# Patient Record
Sex: FEMALE | Race: BLACK OR AFRICAN AMERICAN | NOT HISPANIC OR LATINO | Employment: UNEMPLOYED | URBAN - METROPOLITAN AREA
[De-identification: names, ages, dates, MRNs, and addresses within clinical notes are randomized per-mention and may not be internally consistent; named-entity substitution may affect disease eponyms.]

---

## 2024-07-19 ENCOUNTER — HOSPITAL ENCOUNTER (INPATIENT)
Facility: HOSPITAL | Age: 74
End: 2024-07-19
Attending: STUDENT IN AN ORGANIZED HEALTH CARE EDUCATION/TRAINING PROGRAM | Admitting: INTERNAL MEDICINE
Payer: COMMERCIAL

## 2024-07-19 DIAGNOSIS — I50.9 ACUTE HEART FAILURE, UNSPECIFIED HEART FAILURE TYPE (MULTI): ICD-10-CM

## 2024-07-19 DIAGNOSIS — I49.02 VENTRICULAR FIBRILLATION AND FLUTTER (MULTI): ICD-10-CM

## 2024-07-19 DIAGNOSIS — I95.9 HYPOTENSION, UNSPECIFIED HYPOTENSION TYPE: ICD-10-CM

## 2024-07-19 DIAGNOSIS — I49.01 VENTRICULAR FIBRILLATION AND FLUTTER (MULTI): ICD-10-CM

## 2024-07-19 DIAGNOSIS — I49.01 CARDIAC ARREST WITH VENTRICULAR FIBRILLATION (MULTI): ICD-10-CM

## 2024-07-19 DIAGNOSIS — R41.82 ALTERED MENTAL STATUS, UNSPECIFIED ALTERED MENTAL STATUS TYPE: ICD-10-CM

## 2024-07-19 DIAGNOSIS — Z95.810 PRESENCE OF AUTOMATIC (IMPLANTABLE) CARDIAC DEFIBRILLATOR: ICD-10-CM

## 2024-07-19 DIAGNOSIS — I46.9 CARDIAC ARREST (MULTI): Primary | ICD-10-CM

## 2024-07-19 DIAGNOSIS — I46.9 CARDIAC ARREST WITH VENTRICULAR FIBRILLATION (MULTI): ICD-10-CM

## 2024-07-19 LAB
ABO GROUP (TYPE) IN BLOOD: NORMAL
ALBUMIN SERPL-MCNC: 3.2 G/DL (ref 3.5–5)
ALBUMIN SERPL-MCNC: 3.5 G/DL (ref 3.5–5)
ALP BLD-CCNC: 122 U/L (ref 35–125)
ALT SERPL-CCNC: 91 U/L (ref 5–40)
AMPHETAMINES UR QL SCN>1000 NG/ML: NEGATIVE
ANION GAP BLDA CALCULATED.4IONS-SCNC: 12 MMO/L (ref 10–25)
ANION GAP BLDA CALCULATED.4IONS-SCNC: 15 MMO/L (ref 10–25)
ANION GAP BLDA CALCULATED.4IONS-SCNC: 17 MMO/L (ref 10–25)
ANION GAP SERPL CALC-SCNC: 17 MMOL/L
ANION GAP SERPL CALC-SCNC: 9 MMOL/L
ANTIBODY SCREEN: NORMAL
APTT PPP: 25.5 SECONDS (ref 22–32.5)
ARTERIAL PATENCY WRIST A: NEGATIVE
ARTERIAL PATENCY WRIST A: POSITIVE
ARTERIAL PATENCY WRIST A: POSITIVE
AST SERPL-CCNC: 101 U/L (ref 5–40)
ATRIAL RATE: 141 BPM
BARBITURATES UR QL SCN>300 NG/ML: NEGATIVE
BASE EXCESS BLDA CALC-SCNC: -3.2 MMOL/L (ref -2–3)
BASE EXCESS BLDA CALC-SCNC: -7.2 MMOL/L (ref -2–3)
BASE EXCESS BLDA CALC-SCNC: -8.7 MMOL/L (ref -2–3)
BASOPHILS # BLD AUTO: 0.04 X10*3/UL (ref 0–0.1)
BASOPHILS # BLD AUTO: 0.06 X10*3/UL (ref 0–0.1)
BASOPHILS NFR BLD AUTO: 0.2 %
BASOPHILS NFR BLD AUTO: 0.4 %
BENZODIAZ UR QL SCN>300 NG/ML: NEGATIVE
BILIRUB DIRECT SERPL-MCNC: <0.2 MG/DL (ref 0–0.2)
BILIRUB SERPL-MCNC: 0.5 MG/DL (ref 0.1–1.2)
BODY SURFACE AREA: 2.18 M2
BODY TEMPERATURE: 37 DEGREES CELSIUS
BUN SERPL-MCNC: 12 MG/DL (ref 8–25)
BUN SERPL-MCNC: 14 MG/DL (ref 8–25)
BZE UR QL SCN>300 NG/ML: NEGATIVE
CA-I BLDA-SCNC: 1.1 MMOL/L (ref 1.1–1.33)
CA-I BLDA-SCNC: 1.15 MMOL/L (ref 1.1–1.33)
CA-I BLDA-SCNC: 1.15 MMOL/L (ref 1.1–1.33)
CALCIUM SERPL-MCNC: 8.3 MG/DL (ref 8.5–10.4)
CALCIUM SERPL-MCNC: 8.5 MG/DL (ref 8.5–10.4)
CANNABINOIDS UR QL SCN>50 NG/ML: NEGATIVE
CHLORIDE BLDA-SCNC: 106 MMOL/L (ref 98–107)
CHLORIDE SERPL-SCNC: 104 MMOL/L (ref 97–107)
CHLORIDE SERPL-SCNC: 105 MMOL/L (ref 97–107)
CO2 SERPL-SCNC: 19 MMOL/L (ref 24–31)
CO2 SERPL-SCNC: 23 MMOL/L (ref 24–31)
CREAT SERPL-MCNC: 1.1 MG/DL (ref 0.4–1.6)
CREAT SERPL-MCNC: 1.3 MG/DL (ref 0.4–1.6)
EGFRCR SERPLBLD CKD-EPI 2021: 44 ML/MIN/1.73M*2
EGFRCR SERPLBLD CKD-EPI 2021: 53 ML/MIN/1.73M*2
EOSINOPHIL # BLD AUTO: 0.01 X10*3/UL (ref 0–0.4)
EOSINOPHIL # BLD AUTO: 0.13 X10*3/UL (ref 0–0.4)
EOSINOPHIL NFR BLD AUTO: 0 %
EOSINOPHIL NFR BLD AUTO: 0.9 %
ERYTHROCYTE [DISTWIDTH] IN BLOOD BY AUTOMATED COUNT: 15.9 % (ref 11.5–14.5)
ERYTHROCYTE [DISTWIDTH] IN BLOOD BY AUTOMATED COUNT: 15.9 % (ref 11.5–14.5)
ETHANOL SERPL-MCNC: <0.01 G/DL
FENTANYL+NORFENTANYL UR QL SCN: NEGATIVE
GLUCOSE BLD MANUAL STRIP-MCNC: 124 MG/DL (ref 74–99)
GLUCOSE BLD MANUAL STRIP-MCNC: 128 MG/DL (ref 74–99)
GLUCOSE BLD MANUAL STRIP-MCNC: 188 MG/DL (ref 74–99)
GLUCOSE BLDA-MCNC: 222 MG/DL (ref 74–99)
GLUCOSE BLDA-MCNC: 231 MG/DL (ref 74–99)
GLUCOSE BLDA-MCNC: 263 MG/DL (ref 74–99)
GLUCOSE SERPL-MCNC: 157 MG/DL (ref 65–99)
GLUCOSE SERPL-MCNC: 233 MG/DL (ref 65–99)
HCO3 BLDA-SCNC: 18.8 MMOL/L (ref 22–26)
HCO3 BLDA-SCNC: 20.1 MMOL/L (ref 22–26)
HCO3 BLDA-SCNC: 22.7 MMOL/L (ref 22–26)
HCT VFR BLD AUTO: 38.5 % (ref 36–46)
HCT VFR BLD AUTO: 39.6 % (ref 36–46)
HCT VFR BLD EST: 35 % (ref 36–46)
HCT VFR BLD EST: 37 % (ref 36–46)
HCT VFR BLD EST: 38 % (ref 36–46)
HGB BLD-MCNC: 11.7 G/DL (ref 12–16)
HGB BLD-MCNC: 12 G/DL (ref 12–16)
HGB BLDA-MCNC: 11.6 G/DL (ref 12–16)
HGB BLDA-MCNC: 12.3 G/DL (ref 12–16)
HGB BLDA-MCNC: 12.6 G/DL (ref 12–16)
IMM GRANULOCYTES # BLD AUTO: 0.13 X10*3/UL (ref 0–0.5)
IMM GRANULOCYTES # BLD AUTO: 0.66 X10*3/UL (ref 0–0.5)
IMM GRANULOCYTES NFR BLD AUTO: 0.6 % (ref 0–0.9)
IMM GRANULOCYTES NFR BLD AUTO: 4.5 % (ref 0–0.9)
INHALED O2 CONCENTRATION: 100 %
INR PPP: 1 (ref 0.9–1.2)
LACTATE BLDA-SCNC: 2.1 MMOL/L (ref 0.4–2)
LACTATE BLDA-SCNC: 2.9 MMOL/L (ref 0.4–2)
LACTATE BLDA-SCNC: 4.8 MMOL/L (ref 0.4–2)
LYMPHOCYTES # BLD AUTO: 1.18 X10*3/UL (ref 0.8–3)
LYMPHOCYTES # BLD AUTO: 3.96 X10*3/UL (ref 0.8–3)
LYMPHOCYTES NFR BLD AUTO: 26.9 %
LYMPHOCYTES NFR BLD AUTO: 5.4 %
MAGNESIUM SERPL-MCNC: 2.4 MG/DL (ref 1.6–3.1)
MAGNESIUM SERPL-MCNC: 2.5 MG/DL (ref 1.6–3.1)
MCH RBC QN AUTO: 27.2 PG (ref 26–34)
MCH RBC QN AUTO: 27.6 PG (ref 26–34)
MCHC RBC AUTO-ENTMCNC: 30.3 G/DL (ref 32–36)
MCHC RBC AUTO-ENTMCNC: 30.4 G/DL (ref 32–36)
MCV RBC AUTO: 90 FL (ref 80–100)
MCV RBC AUTO: 91 FL (ref 80–100)
METHADONE UR QL SCN>300 NG/ML: NEGATIVE
MONOCYTES # BLD AUTO: 0.52 X10*3/UL (ref 0.05–0.8)
MONOCYTES # BLD AUTO: 0.92 X10*3/UL (ref 0.05–0.8)
MONOCYTES NFR BLD AUTO: 3.5 %
MONOCYTES NFR BLD AUTO: 4.2 %
MRSA DNA SPEC QL NAA+PROBE: NOT DETECTED
NEUTROPHILS # BLD AUTO: 19.45 X10*3/UL (ref 1.6–5.5)
NEUTROPHILS # BLD AUTO: 9.4 X10*3/UL (ref 1.6–5.5)
NEUTROPHILS NFR BLD AUTO: 63.8 %
NEUTROPHILS NFR BLD AUTO: 89.6 %
NRBC BLD-RTO: 0 /100 WBCS (ref 0–0)
NRBC BLD-RTO: 0 /100 WBCS (ref 0–0)
OPIATES UR QL SCN>300 NG/ML: NEGATIVE
OXYCODONE UR QL: NEGATIVE
OXYHGB MFR BLDA: 90.9 % (ref 94–98)
OXYHGB MFR BLDA: 95.2 % (ref 94–98)
OXYHGB MFR BLDA: 96.9 % (ref 94–98)
P AXIS: 63 DEGREES
PCO2 BLDA: 43 MM HG (ref 38–42)
PCO2 BLDA: 46 MM HG (ref 38–42)
PCO2 BLDA: 47 MM HG (ref 38–42)
PCP UR QL SCN>25 NG/ML: NEGATIVE
PEEP CMH2O: 8 CM H2O
PH BLDA: 7.22 PH (ref 7.38–7.42)
PH BLDA: 7.24 PH (ref 7.38–7.42)
PH BLDA: 7.33 PH (ref 7.38–7.42)
PHOSPHATE SERPL-MCNC: 2.2 MG/DL (ref 2.5–4.5)
PLATELET # BLD AUTO: 229 X10*3/UL (ref 150–450)
PLATELET # BLD AUTO: 246 X10*3/UL (ref 150–450)
PO2 BLDA: 179 MM HG (ref 85–95)
PO2 BLDA: 68 MM HG (ref 85–95)
PO2 BLDA: 89 MM HG (ref 85–95)
POTASSIUM BLDA-SCNC: 3 MMOL/L (ref 3.5–5.3)
POTASSIUM BLDA-SCNC: 3.3 MMOL/L (ref 3.5–5.3)
POTASSIUM BLDA-SCNC: 3.4 MMOL/L (ref 3.5–5.3)
POTASSIUM SERPL-SCNC: 3 MMOL/L (ref 3.4–5.1)
POTASSIUM SERPL-SCNC: 3.9 MMOL/L (ref 3.4–5.1)
PROT SERPL-MCNC: 6.8 G/DL (ref 5.9–7.9)
PROTHROMBIN TIME: 11 SECONDS (ref 9.3–12.7)
Q ONSET: 209 MS
QRS COUNT: 15 BEATS
QRS DURATION: 122 MS
QT INTERVAL: 396 MS
QTC CALCULATION(BAZETT): 492 MS
QTC FREDERICIA: 458 MS
R AXIS: -45 DEGREES
RBC # BLD AUTO: 4.3 X10*6/UL (ref 4–5.2)
RBC # BLD AUTO: 4.34 X10*6/UL (ref 4–5.2)
RH FACTOR (ANTIGEN D): NORMAL
SAO2 % BLDA: 92 % (ref 94–100)
SAO2 % BLDA: 97 % (ref 94–100)
SAO2 % BLDA: 99 % (ref 94–100)
SODIUM BLDA-SCNC: 137 MMOL/L (ref 136–145)
SODIUM BLDA-SCNC: 138 MMOL/L (ref 136–145)
SODIUM BLDA-SCNC: 139 MMOL/L (ref 136–145)
SODIUM SERPL-SCNC: 136 MMOL/L (ref 133–145)
SODIUM SERPL-SCNC: 141 MMOL/L (ref 133–145)
SPECIMEN DRAWN FROM PATIENT: ABNORMAL
T AXIS: 124 DEGREES
T OFFSET: 407 MS
TIDAL VOLUME: 450 ML
TROPONIN T SERPL-MCNC: 18 NG/L
TSH SERPL DL<=0.05 MIU/L-ACNC: 2.98 MIU/L (ref 0.27–4.2)
VENTILATOR MODE: ABNORMAL
VENTILATOR RATE: 14 BPM
VENTILATOR RATE: 16 BPM
VENTILATOR RATE: 20 BPM
VENTRICULAR RATE: 93 BPM
WBC # BLD AUTO: 14.7 X10*3/UL (ref 4.4–11.3)
WBC # BLD AUTO: 21.7 X10*3/UL (ref 4.4–11.3)

## 2024-07-19 PROCEDURE — 82947 ASSAY GLUCOSE BLOOD QUANT: CPT

## 2024-07-19 PROCEDURE — 2500000004 HC RX 250 GENERAL PHARMACY W/ HCPCS (ALT 636 FOR OP/ED)

## 2024-07-19 PROCEDURE — 2020000001 HC ICU ROOM DAILY

## 2024-07-19 PROCEDURE — 70450 CT HEAD/BRAIN W/O DYE: CPT | Performed by: STUDENT IN AN ORGANIZED HEALTH CARE EDUCATION/TRAINING PROGRAM

## 2024-07-19 PROCEDURE — 2500000004 HC RX 250 GENERAL PHARMACY W/ HCPCS (ALT 636 FOR OP/ED): Mod: JZ | Performed by: INTERNAL MEDICINE

## 2024-07-19 PROCEDURE — 96375 TX/PRO/DX INJ NEW DRUG ADDON: CPT | Mod: 59

## 2024-07-19 PROCEDURE — 99291 CRITICAL CARE FIRST HOUR: CPT | Mod: 25 | Performed by: STUDENT IN AN ORGANIZED HEALTH CARE EDUCATION/TRAINING PROGRAM

## 2024-07-19 PROCEDURE — 82248 BILIRUBIN DIRECT: CPT | Performed by: STUDENT IN AN ORGANIZED HEALTH CARE EDUCATION/TRAINING PROGRAM

## 2024-07-19 PROCEDURE — 37799 UNLISTED PX VASCULAR SURGERY: CPT | Performed by: STUDENT IN AN ORGANIZED HEALTH CARE EDUCATION/TRAINING PROGRAM

## 2024-07-19 PROCEDURE — 82435 ASSAY OF BLOOD CHLORIDE: CPT | Performed by: STUDENT IN AN ORGANIZED HEALTH CARE EDUCATION/TRAINING PROGRAM

## 2024-07-19 PROCEDURE — 2500000005 HC RX 250 GENERAL PHARMACY W/O HCPCS: Performed by: INTERNAL MEDICINE

## 2024-07-19 PROCEDURE — 84443 ASSAY THYROID STIM HORMONE: CPT | Performed by: STUDENT IN AN ORGANIZED HEALTH CARE EDUCATION/TRAINING PROGRAM

## 2024-07-19 PROCEDURE — 99292 CRITICAL CARE ADDL 30 MIN: CPT | Performed by: STUDENT IN AN ORGANIZED HEALTH CARE EDUCATION/TRAINING PROGRAM

## 2024-07-19 PROCEDURE — 94799 UNLISTED PULMONARY SVC/PX: CPT

## 2024-07-19 PROCEDURE — 99291 CRITICAL CARE FIRST HOUR: CPT | Performed by: INTERNAL MEDICINE

## 2024-07-19 PROCEDURE — 82330 ASSAY OF CALCIUM: CPT | Performed by: STUDENT IN AN ORGANIZED HEALTH CARE EDUCATION/TRAINING PROGRAM

## 2024-07-19 PROCEDURE — 87040 BLOOD CULTURE FOR BACTERIA: CPT | Mod: WESLAB | Performed by: STUDENT IN AN ORGANIZED HEALTH CARE EDUCATION/TRAINING PROGRAM

## 2024-07-19 PROCEDURE — 85025 COMPLETE CBC W/AUTO DIFF WBC: CPT

## 2024-07-19 PROCEDURE — 84132 ASSAY OF SERUM POTASSIUM: CPT | Performed by: STUDENT IN AN ORGANIZED HEALTH CARE EDUCATION/TRAINING PROGRAM

## 2024-07-19 PROCEDURE — 83735 ASSAY OF MAGNESIUM: CPT | Performed by: STUDENT IN AN ORGANIZED HEALTH CARE EDUCATION/TRAINING PROGRAM

## 2024-07-19 PROCEDURE — 31500 INSERT EMERGENCY AIRWAY: CPT | Performed by: STUDENT IN AN ORGANIZED HEALTH CARE EDUCATION/TRAINING PROGRAM

## 2024-07-19 PROCEDURE — 36556 INSERT NON-TUNNEL CV CATH: CPT | Performed by: STUDENT IN AN ORGANIZED HEALTH CARE EDUCATION/TRAINING PROGRAM

## 2024-07-19 PROCEDURE — 02HV33Z INSERTION OF INFUSION DEVICE INTO SUPERIOR VENA CAVA, PERCUTANEOUS APPROACH: ICD-10-PCS | Performed by: INTERNAL MEDICINE

## 2024-07-19 PROCEDURE — 87640 STAPH A DNA AMP PROBE: CPT | Performed by: INTERNAL MEDICINE

## 2024-07-19 PROCEDURE — 80307 DRUG TEST PRSMV CHEM ANLYZR: CPT | Performed by: STUDENT IN AN ORGANIZED HEALTH CARE EDUCATION/TRAINING PROGRAM

## 2024-07-19 PROCEDURE — 70450 CT HEAD/BRAIN W/O DYE: CPT | Performed by: RADIOLOGY

## 2024-07-19 PROCEDURE — 87641 MR-STAPH DNA AMP PROBE: CPT | Performed by: INTERNAL MEDICINE

## 2024-07-19 PROCEDURE — 94681 O2 UPTK CO2 OUTP % O2 XTRC: CPT

## 2024-07-19 PROCEDURE — 71045 X-RAY EXAM CHEST 1 VIEW: CPT | Performed by: RADIOLOGY

## 2024-07-19 PROCEDURE — 85730 THROMBOPLASTIN TIME PARTIAL: CPT | Performed by: STUDENT IN AN ORGANIZED HEALTH CARE EDUCATION/TRAINING PROGRAM

## 2024-07-19 PROCEDURE — 36620 INSERTION CATHETER ARTERY: CPT | Performed by: STUDENT IN AN ORGANIZED HEALTH CARE EDUCATION/TRAINING PROGRAM

## 2024-07-19 PROCEDURE — 2500000004 HC RX 250 GENERAL PHARMACY W/ HCPCS (ALT 636 FOR OP/ED): Performed by: STUDENT IN AN ORGANIZED HEALTH CARE EDUCATION/TRAINING PROGRAM

## 2024-07-19 PROCEDURE — 94002 VENT MGMT INPAT INIT DAY: CPT

## 2024-07-19 PROCEDURE — 83735 ASSAY OF MAGNESIUM: CPT

## 2024-07-19 PROCEDURE — 5A1955Z RESPIRATORY VENTILATION, GREATER THAN 96 CONSECUTIVE HOURS: ICD-10-PCS | Performed by: INTERNAL MEDICINE

## 2024-07-19 PROCEDURE — 84132 ASSAY OF SERUM POTASSIUM: CPT

## 2024-07-19 PROCEDURE — 36600 WITHDRAWAL OF ARTERIAL BLOOD: CPT

## 2024-07-19 PROCEDURE — 93010 ELECTROCARDIOGRAM REPORT: CPT | Performed by: INTERNAL MEDICINE

## 2024-07-19 PROCEDURE — 86901 BLOOD TYPING SEROLOGIC RH(D): CPT | Performed by: STUDENT IN AN ORGANIZED HEALTH CARE EDUCATION/TRAINING PROGRAM

## 2024-07-19 PROCEDURE — 2500000001 HC RX 250 WO HCPCS SELF ADMINISTERED DRUGS (ALT 637 FOR MEDICARE OP)

## 2024-07-19 PROCEDURE — 96374 THER/PROPH/DIAG INJ IV PUSH: CPT | Mod: 59

## 2024-07-19 PROCEDURE — 85025 COMPLETE CBC W/AUTO DIFF WBC: CPT | Performed by: STUDENT IN AN ORGANIZED HEALTH CARE EDUCATION/TRAINING PROGRAM

## 2024-07-19 PROCEDURE — 84295 ASSAY OF SERUM SODIUM: CPT

## 2024-07-19 PROCEDURE — 86900 BLOOD TYPING SEROLOGIC ABO: CPT | Performed by: STUDENT IN AN ORGANIZED HEALTH CARE EDUCATION/TRAINING PROGRAM

## 2024-07-19 PROCEDURE — 85610 PROTHROMBIN TIME: CPT | Performed by: STUDENT IN AN ORGANIZED HEALTH CARE EDUCATION/TRAINING PROGRAM

## 2024-07-19 PROCEDURE — 84132 ASSAY OF SERUM POTASSIUM: CPT | Performed by: INTERNAL MEDICINE

## 2024-07-19 PROCEDURE — 82077 ASSAY SPEC XCP UR&BREATH IA: CPT | Performed by: STUDENT IN AN ORGANIZED HEALTH CARE EDUCATION/TRAINING PROGRAM

## 2024-07-19 PROCEDURE — 2500000005 HC RX 250 GENERAL PHARMACY W/O HCPCS: Performed by: STUDENT IN AN ORGANIZED HEALTH CARE EDUCATION/TRAINING PROGRAM

## 2024-07-19 PROCEDURE — 0BH17EZ INSERTION OF ENDOTRACHEAL AIRWAY INTO TRACHEA, VIA NATURAL OR ARTIFICIAL OPENING: ICD-10-PCS | Performed by: INTERNAL MEDICINE

## 2024-07-19 PROCEDURE — 84484 ASSAY OF TROPONIN QUANT: CPT | Performed by: STUDENT IN AN ORGANIZED HEALTH CARE EDUCATION/TRAINING PROGRAM

## 2024-07-19 PROCEDURE — 37799 UNLISTED PX VASCULAR SURGERY: CPT

## 2024-07-19 PROCEDURE — 36415 COLL VENOUS BLD VENIPUNCTURE: CPT | Performed by: STUDENT IN AN ORGANIZED HEALTH CARE EDUCATION/TRAINING PROGRAM

## 2024-07-19 PROCEDURE — 85018 HEMOGLOBIN: CPT | Performed by: INTERNAL MEDICINE

## 2024-07-19 PROCEDURE — B2111ZZ FLUOROSCOPY OF MULTIPLE CORONARY ARTERIES USING LOW OSMOLAR CONTRAST: ICD-10-PCS | Performed by: INTERNAL MEDICINE

## 2024-07-19 RX ORDER — NOREPINEPHRINE BITARTRATE/D5W 8 MG/250ML
0-.2 PLASTIC BAG, INJECTION (ML) INTRAVENOUS CONTINUOUS
Status: DISCONTINUED | OUTPATIENT
Start: 2024-07-19 | End: 2024-07-22

## 2024-07-19 RX ORDER — PROPOFOL 10 MG/ML
5-50 INJECTION, EMULSION INTRAVENOUS CONTINUOUS
Status: DISCONTINUED | OUTPATIENT
Start: 2024-07-19 | End: 2024-07-21

## 2024-07-19 RX ORDER — POTASSIUM CHLORIDE 29.8 MG/ML
40 INJECTION INTRAVENOUS ONCE
Status: DISCONTINUED | OUTPATIENT
Start: 2024-07-19 | End: 2024-07-19 | Stop reason: CLARIF

## 2024-07-19 RX ORDER — VANCOMYCIN HYDROCHLORIDE 1 G/20ML
INJECTION, POWDER, LYOPHILIZED, FOR SOLUTION INTRAVENOUS DAILY PRN
Status: DISCONTINUED | OUTPATIENT
Start: 2024-07-19 | End: 2024-07-21

## 2024-07-19 RX ORDER — POTASSIUM CHLORIDE 1.5 G/1.58G
40 POWDER, FOR SOLUTION ORAL ONCE
Status: COMPLETED | OUTPATIENT
Start: 2024-07-19 | End: 2024-07-19

## 2024-07-19 RX ORDER — POTASSIUM CHLORIDE 29.8 MG/ML
40 INJECTION INTRAVENOUS ONCE
Status: COMPLETED | OUTPATIENT
Start: 2024-07-19 | End: 2024-07-19

## 2024-07-19 RX ORDER — FENTANYL CITRATE 50 UG/ML
150 INJECTION, SOLUTION INTRAMUSCULAR; INTRAVENOUS ONCE
Status: COMPLETED | OUTPATIENT
Start: 2024-07-19 | End: 2024-07-19

## 2024-07-19 RX ORDER — VANCOMYCIN 1.75 G/350ML
1250 INJECTION, SOLUTION INTRAVENOUS EVERY 24 HOURS
Status: DISCONTINUED | OUTPATIENT
Start: 2024-07-19 | End: 2024-07-21

## 2024-07-19 RX ORDER — MAGNESIUM SULFATE HEPTAHYDRATE 40 MG/ML
2 INJECTION, SOLUTION INTRAVENOUS ONCE
Status: COMPLETED | OUTPATIENT
Start: 2024-07-19 | End: 2024-07-19

## 2024-07-19 RX ORDER — AMLODIPINE BESYLATE 10 MG/1
10 TABLET ORAL
COMMUNITY
Start: 2024-07-01 | End: 2024-08-02 | Stop reason: HOSPADM

## 2024-07-19 RX ORDER — FENTANYL CITRATE 50 UG/ML
25 INJECTION, SOLUTION INTRAMUSCULAR; INTRAVENOUS EVERY 10 MIN PRN
Status: DISCONTINUED | OUTPATIENT
Start: 2024-07-19 | End: 2024-07-21

## 2024-07-19 RX ORDER — ALLOPURINOL 300 MG/1
300 TABLET ORAL
COMMUNITY
Start: 2024-07-01 | End: 2024-12-28

## 2024-07-19 RX ORDER — FENTANYL CITRATE-0.9 % NACL/PF 10 MCG/ML
0-300 PLASTIC BAG, INJECTION (ML) INTRAVENOUS CONTINUOUS
Status: DISCONTINUED | OUTPATIENT
Start: 2024-07-19 | End: 2024-07-21

## 2024-07-19 RX ORDER — HEPARIN SODIUM 5000 [USP'U]/ML
5000 INJECTION, SOLUTION INTRAVENOUS; SUBCUTANEOUS EVERY 8 HOURS
Status: DISCONTINUED | OUTPATIENT
Start: 2024-07-19 | End: 2024-07-21

## 2024-07-19 RX ORDER — FENTANYL CITRATE 50 UG/ML
25 INJECTION, SOLUTION INTRAMUSCULAR; INTRAVENOUS ONCE
Status: DISCONTINUED | OUTPATIENT
Start: 2024-07-19 | End: 2024-07-21

## 2024-07-19 RX ORDER — NOREPINEPHRINE BITARTRATE/D5W 8 MG/250ML
PLASTIC BAG, INJECTION (ML) INTRAVENOUS
Status: COMPLETED
Start: 2024-07-19 | End: 2024-07-19

## 2024-07-19 RX ORDER — ATORVASTATIN CALCIUM 10 MG/1
10 TABLET, FILM COATED ORAL
COMMUNITY
Start: 2024-07-01 | End: 2024-12-28

## 2024-07-19 RX ADMIN — NOREPINEPHRINE BITARTRATE 0.1 MCG/KG/MIN: 8 INJECTION, SOLUTION INTRAVENOUS at 13:37

## 2024-07-19 RX ADMIN — PERFLUTREN 10 ML OF DILUTION: 6.52 INJECTION, SUSPENSION INTRAVENOUS at 15:56

## 2024-07-19 RX ADMIN — FENTANYL CITRATE 25 MCG: 0.05 INJECTION, SOLUTION INTRAMUSCULAR; INTRAVENOUS at 13:35

## 2024-07-19 RX ADMIN — MAGNESIUM SULFATE HEPTAHYDRATE 2 G: 40 INJECTION, SOLUTION INTRAVENOUS at 16:25

## 2024-07-19 RX ADMIN — FENTANYL CITRATE 150 MCG: 50 INJECTION INTRAMUSCULAR; INTRAVENOUS at 13:22

## 2024-07-19 RX ADMIN — POTASSIUM CHLORIDE 40 MEQ: 1.5 FOR SOLUTION ORAL at 16:24

## 2024-07-19 RX ADMIN — VANCOMYCIN 1250 MG: 1.75 INJECTION, SOLUTION INTRAVENOUS at 23:24

## 2024-07-19 RX ADMIN — PROPOFOL 5 MCG/KG/MIN: 10 INJECTION, EMULSION INTRAVENOUS at 16:27

## 2024-07-19 RX ADMIN — Medication 25 MCG/HR: at 14:19

## 2024-07-19 RX ADMIN — Medication 100 PERCENT: at 13:30

## 2024-07-19 RX ADMIN — Medication 80 PERCENT: at 20:59

## 2024-07-19 RX ADMIN — POTASSIUM CHLORIDE 40 MEQ: 29.8 INJECTION, SOLUTION INTRAVENOUS at 15:29

## 2024-07-19 RX ADMIN — HEPARIN SODIUM 5000 UNITS: 5000 INJECTION, SOLUTION INTRAVENOUS; SUBCUTANEOUS at 16:24

## 2024-07-19 SDOH — SOCIAL STABILITY: SOCIAL INSECURITY: ARE THERE ANY APPARENT SIGNS OF INJURIES/BEHAVIORS THAT COULD BE RELATED TO ABUSE/NEGLECT?: UNABLE TO ASSESS

## 2024-07-19 SDOH — SOCIAL STABILITY: SOCIAL INSECURITY: HAVE YOU HAD THOUGHTS OF HARMING ANYONE ELSE?: UNABLE TO ASSESS

## 2024-07-19 SDOH — SOCIAL STABILITY: SOCIAL INSECURITY: DOES ANYONE TRY TO KEEP YOU FROM HAVING/CONTACTING OTHER FRIENDS OR DOING THINGS OUTSIDE YOUR HOME?: UNABLE TO ASSESS

## 2024-07-19 SDOH — SOCIAL STABILITY: SOCIAL INSECURITY: DO YOU FEEL UNSAFE GOING BACK TO THE PLACE WHERE YOU ARE LIVING?: UNABLE TO ASSESS

## 2024-07-19 SDOH — SOCIAL STABILITY: SOCIAL INSECURITY: WERE YOU ABLE TO COMPLETE ALL THE BEHAVIORAL HEALTH SCREENINGS?: NO

## 2024-07-19 SDOH — SOCIAL STABILITY: SOCIAL INSECURITY: ARE YOU OR HAVE YOU BEEN THREATENED OR ABUSED PHYSICALLY, EMOTIONALLY, OR SEXUALLY BY ANYONE?: UNABLE TO ASSESS

## 2024-07-19 SDOH — SOCIAL STABILITY: SOCIAL INSECURITY: DO YOU FEEL ANYONE HAS EXPLOITED OR TAKEN ADVANTAGE OF YOU FINANCIALLY OR OF YOUR PERSONAL PROPERTY?: UNABLE TO ASSESS

## 2024-07-19 SDOH — SOCIAL STABILITY: SOCIAL INSECURITY: HAVE YOU HAD ANY THOUGHTS OF HARMING ANYONE ELSE?: UNABLE TO ASSESS

## 2024-07-19 SDOH — SOCIAL STABILITY: SOCIAL INSECURITY: HAS ANYONE EVER THREATENED TO HURT YOUR FAMILY OR YOUR PETS?: UNABLE TO ASSESS

## 2024-07-19 SDOH — SOCIAL STABILITY: SOCIAL INSECURITY: ABUSE: ADULT

## 2024-07-19 ASSESSMENT — ACTIVITIES OF DAILY LIVING (ADL)
PATIENT'S MEMORY ADEQUATE TO SAFELY COMPLETE DAILY ACTIVITIES?: UNABLE TO ASSESS
ASSISTIVE_DEVICE: OTHER (COMMENT)
DRESSING YOURSELF: UNABLE TO ASSESS
FEEDING YOURSELF: UNABLE TO ASSESS
GROOMING: UNABLE TO ASSESS
BATHING: UNABLE TO ASSESS
ADEQUATE_TO_COMPLETE_ADL: UNABLE TO ASSESS
WALKS IN HOME: UNABLE TO ASSESS
HEARING - RIGHT EAR: UNABLE TO ASSESS
TOILETING: UNABLE TO ASSESS
HEARING - LEFT EAR: UNABLE TO ASSESS
JUDGMENT_ADEQUATE_SAFELY_COMPLETE_DAILY_ACTIVITIES: UNABLE TO ASSESS

## 2024-07-19 ASSESSMENT — PAIN - FUNCTIONAL ASSESSMENT
PAIN_FUNCTIONAL_ASSESSMENT: CPOT (CRITICAL CARE PAIN OBSERVATION TOOL)
PAIN_FUNCTIONAL_ASSESSMENT: 0-10

## 2024-07-19 ASSESSMENT — PATIENT HEALTH QUESTIONNAIRE - PHQ9
1. LITTLE INTEREST OR PLEASURE IN DOING THINGS: NOT AT ALL
SUM OF ALL RESPONSES TO PHQ9 QUESTIONS 1 & 2: 0
2. FEELING DOWN, DEPRESSED OR HOPELESS: NOT AT ALL

## 2024-07-19 ASSESSMENT — PAIN SCALES - GENERAL
PAINLEVEL_OUTOF10: 0 - NO PAIN
PAINLEVEL_OUTOF10: 0 - NO PAIN

## 2024-07-19 ASSESSMENT — LIFESTYLE VARIABLES
HOW OFTEN DO YOU HAVE 6 OR MORE DRINKS ON ONE OCCASION: PATIENT UNABLE TO ANSWER
HOW MANY STANDARD DRINKS CONTAINING ALCOHOL DO YOU HAVE ON A TYPICAL DAY: PATIENT UNABLE TO ANSWER
AUDIT-C TOTAL SCORE: -1
AUDIT-C TOTAL SCORE: -1
HOW OFTEN DO YOU HAVE A DRINK CONTAINING ALCOHOL: PATIENT UNABLE TO ANSWER
SKIP TO QUESTIONS 9-10: 0

## 2024-07-19 ASSESSMENT — PAIN DESCRIPTION - PROGRESSION: CLINICAL_PROGRESSION: NOT CHANGED

## 2024-07-19 ASSESSMENT — COGNITIVE AND FUNCTIONAL STATUS - GENERAL: PATIENT BASELINE BEDBOUND: UNABLE TO ASSESS AT THIS TIME

## 2024-07-19 NOTE — CONSULTS
Consults  History Of Present Illness:    Nilam Ribeiro is a 73 y.o. female presenting with medical history significant for having history of hypertension, gout and history of osteoarthritis which limits her functional capacity with central lifestyle lives in [place] with her  was brought in for cardiac arrest.  Patient has 5 children and 2 of the younger ones are physically challenged and patient  just left the patient for few minutes to attend one of the younger children and in a few minutes he had heard a loud noise and then came back to the living room and noticed that his wife is unconscious and he tried to wake her up without any response.  He then called 911 and within 4 minutes the EMS was there and patient was noted to be in ventricular fibrillation for which she did underwent cardioversion and CPR and had 1 round of epinephrine following which ROSC was obtained patient was placed on the gel pack and brought to the emergency room.  In the emergency room patient was intubated and patient noted to be having frequent PVCs on the EKG but no evidence for acute ST elevation myocardial infarction.  Patient was subsequently being admitted intensive care unit after she had a CT scan of the head.  Patient is not responsive she has pinpointed pupils.  She remains on mechanical ventilation not in any inotropes at this time.  I was able to talk to the and obtained information.  He patient's  Mr. Cui in the emergency room in the consult room.  Patient usually follows with Dr. Dillon at [facility] for her primary care needs for hypertension and history of gout and receives medications once every 6 months.  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  No major surgeries were noted.  She does not follow with any other physicians.  Patient was noted to be having swelling in both lower extremities and 1 time she was on Lasix and patient was not sure if she is currently taking Lasix or  "not.  No prior echocardiogram or stress testing was performed as per the patient's  information.  No history of smoking or alcohol use.  Last Recorded Vitals:  Vitals:    07/19/24 1640 07/19/24 1645 07/19/24 1700 07/19/24 1715   BP:       BP Location:       Patient Position:       Pulse: 61 62 59 65   Resp: 20 20 20 20   Temp:       TempSrc:       SpO2: 99% 98% 99% 99%   Weight:       Height:           Last Labs:  CBC - 7/19/2024: 12:57 PM  14.7 12.0 246    39.6      CMP - 7/19/2024: 12:57 PM  8.5 6.8 101 --- 0.5   _ 3.5 91 122      PTT - 7/19/2024: 12:59 PM  1.0   11.0 25.5     Hemoglobin A1C   Date/Time Value Ref Range Status   06/29/2023 09:52 AM 5.7 (H) 4.3 - 5.6 % Final     Comment:     American Diabetes Association guidelines indicate that patients with HgbA1c in the range 5.7-6.4% are at increased risk for development of diabetes, and intervention by lifestyle modification may be beneficial. HgbA1c greater or equal to 6.5% is considered diagnostic of diabetes.   12/03/2021 02:00 PM 6.0 (H) 4.3 - 5.6 % Final     Comment:     American Diabetes Association guidelines indicate that patients with HgbA1c in   the range 5.7-6.4% are at increased risk for development of diabetes, and   intervention by lifestyle modification may be beneficial. HgbA1c greater or   equal to 6.5% is considered diagnostic of diabetes.      Last I/O:  No intake/output data recorded.    Past Cardiology Tests (Last 3 Years):  EKG:  ECG 12 lead 07/19/2024 (Preliminary)    Echo:  No results found for this or any previous visit from the past 1095 days.    Ejection Fractions:  No results found for: \"EF\"  Cath:  No results found for this or any previous visit from the past 1095 days.    Stress Test:  No results found for this or any previous visit from the past 1095 days.    Cardiac Imaging:  No results found for this or any previous visit from the past 1095 days.      Past Medical History:  She has no past medical history on file.    Past " Surgical History:  She has no past surgical history on file.      Social History:  She reports that she has never smoked. She has never been exposed to tobacco smoke. She has never used smokeless tobacco. No history on file for alcohol use and drug use.    Family History:  No family history on file.     Allergies:  Patient has no known allergies.    Inpatient Medications:  Scheduled medications   Medication Dose Route Frequency    fentaNYL  25 mcg intravenous Once    heparin (porcine)  5,000 Units subcutaneous q8h    magnesium sulfate  2 g intravenous Once    oxygen   inhalation Continuous - Inhalation    potassium chloride  40 mEq intravenous Once     PRN medications   Medication    fentaNYL     Continuous Medications   Medication Dose Last Rate    fentaNYL  0-300 mcg/hr 25 mcg/hr (07/19/24 1700)    norepinephrine  0-0.2 mcg/kg/min 0.05 mcg/kg/min (07/19/24 1700)    propofol  5-50 mcg/kg/min 5 mcg/kg/min (07/19/24 1700)     Outpatient Medications:  Current Outpatient Medications   Medication Instructions    allopurinol (ZYLOPRIM) 300 mg, oral, Daily RT    amLODIPine (NORVASC) 10 mg, oral, Daily RT    atorvastatin (LIPITOR) 10 mg, oral, Daily RT       Physical Exam:  HEENT patient remains on mechanical ventilator with pinpoint pupils.  Neck is supple lungs clear to auscultation anteriorly heart S1-S2 present with no significant murmurs abdominal obese and soft extremity shows no sweat.  Edema     Assessment/Plan   #1 cardiac arrest patient was in ventricular fibrillation for which she underwent cardioversion and was intubated in the emergency room.  Patient did receive 1 round of epinephrine in the field.  Patient will be admitted intensive care unit.  The first troponin was unremarkable at 18.  EKG shows no acute ST segment changes concerning with ischemia or myocardial injury.  Will continue hospitalization and will probably benefit from hypothermia protocol and will be followed by Dr. Vásquez intensivist.   Will evaluate for any anoxic encephalopathy and if she recovers from this event she would merit to have left heart catheterization.  Patient will be followed by my colleague Dr. Byrd over the weekend.  Bedside echocardiogram performed in the intensive care unit showed ejection fraction out of 50% with no significant valve abnormalities.  There is no evidence for pericardial effusion.  There is no evidence for significant wall motion normalities except for mildly dilated left ventricle possibly secondary to underlying longstanding hypertension with a competent of diastolic dysfunction.  2.  Hypokalemia for which patient received IV potassium supplementation and patient did have central line placed and she was initiated on IV potassium in the ICU  3.  History of hypertension for which she was on amlodipine  4.  History of gout for which she remains on allopurinol maintenance dose 300 mg daily  5.  History of dyslipidemia for which she remains on Lipitor 10 mg nightly  CVC 07/19/24 Triple lumen Right Internal jugular (Active)   Line Necessity Intravenous medication therapy 07/19/24 1600   Line Necessity Reviewed With Norman JASON 07/19/24 1600   Site Assessment Clean;Dry;Intact 07/19/24 1600   Proximal Lumen Status Blood return noted;Flushed;Infusing 07/19/24 1600   Medial 1 Lumen Status Blood return noted;Flushed;Infusing 07/19/24 1600   Distal Lumen Status Blood return noted;Flushed;Infusing 07/19/24 1600   Tubing Change Tubing changed 07/19/24 1600   Dressing Type Antimicrobial patch;Transparent 07/19/24 1600   Dressing Status Clean;Dry;Occlusive 07/19/24 1600   Dressing Change Due 07/26/24 07/19/24 1600   Number of days: 0       Peripheral IV 07/19/24 20 G Right Antecubital (Active)   Site Assessment Clean;Dry;Intact 07/19/24 1600   Dressing Type Transparent 07/19/24 1600   Line Status Blood return noted;Flushed;Saline locked;Capped 07/19/24 1600   Dressing Status Clean;Dry;Occlusive 07/19/24 1600   Number of  days: 0       Peripheral IV 20 G Right;Anterior Wrist (Active)   Site Assessment Clean;Dry;Intact 07/19/24 1600   Dressing Type Transparent 07/19/24 1600   Line Status Blood return noted;Flushed;Saline locked;Capped 07/19/24 1600   Dressing Status Clean;Dry;Occlusive 07/19/24 1600   Number of days: 0       ETT  7.5 mm (Active)   Secured at (cm) 24 cm 07/19/24 1515   Measured from Lips 07/19/24 1515   Secured Location Center 07/19/24 1515   Secured by Commercial tube burns 07/19/24 1515   Site Condition Cool;Dry 07/19/24 1515   Number of days: 0       Arterial Line 07/19/24 Left Radial (Active)   Site Assessment Clean;Dry;Intact 07/19/24 1600   Line Status Pulsatile blood flow 07/19/24 1600   Art Line Waveform Appropriate;Square wave test performed 07/19/24 1600   Art Line Interventions Zeroed and calibrated;Leveled;Tubing changed;Connections checked and tightened;Flushed per protocol;Line pulled back 07/19/24 1600   Color/Movement/Sensation Capillary refill less than 3 sec;Cool fingers/toes 07/19/24 1600   Dressing Type Antimicrobial patch;Transparent 07/19/24 1600   Dressing Status Clean;Dry;Occlusive 07/19/24 1600   Dressing Intervention Dressing changed 07/19/24 1600   Dressing Change Due 07/26/24 07/19/24 1600   Number of days: 0       NG/OG/Feeding Tube OG - [facility] sump 18 Fr Left mouth (Active)   Tube Status Clamped 07/19/24 1615   Irrigant Tap water 07/19/24 1615   Intake (mL) 180 mL 07/19/24 1615   Number of days: 0       Urethral Catheter Non-latex 18 Fr. (Active)   Collection Container Urometer 07/19/24 1615   Number of days: 0       Code Status:  Full Code    I spent 35 minutes in the professional and overall care of this patient.        Trever Rojo MD

## 2024-07-19 NOTE — H&P
History Of Present Illness  Nilam Ribeiro is a 73 y.o. female with h/o HTN, DLP, gout, who was found unresponsive at home, EMS was called and arrived 10-20 min later. Upon their arrival, patient pulseless, BLS and ACLS was initiated. ROSC after 2 shock and 1 epi. Igel was placed, BMV until arrival to the ED. In the ED work up revealed severe mixed acidosis, hyperglycemia, mild leukocytosis, was intubated and placed on MV. Also hypotensive requiring Levophed.   No acute events since arrival to the ICU. BP is improving.  Currently is intubated and unresponsive and cannot provide history.   Past Medical History  HTN, DLP, gout, uterine fibroid  Surgical History  Colonoscopy, 2016, hysterectomy  Social History  Never smoked  Family History  +ve for cancer, HTN, CVA  No current facility-administered medications on file prior to encounter.     Current Outpatient Medications on File Prior to Encounter   Medication Sig Dispense Refill    allopurinol (Zyloprim) 300 mg tablet Take 1 tablet (300 mg) by mouth once daily.      amLODIPine (Norvasc) 10 mg tablet Take 1 tablet (10 mg) by mouth once daily.      atorvastatin (Lipitor) 10 mg tablet Take 1 tablet (10 mg) by mouth once daily.     Allergies  Patient has no allergy information on record.  Review of Systems   Unable to perform ROS: Intubated   Scheduled medications  fentaNYL, 25 mcg, intravenous, Once  heparin (porcine), 5,000 Units, subcutaneous, q8h  oxygen, , inhalation, Continuous - Inhalation  potassium chloride, 40 mEq, intravenous, Once    Continuous medications  fentaNYL, 0-300 mcg/hr, Last Rate: 25 mcg/hr (07/19/24 1800)  norepinephrine, 0-0.2 mcg/kg/min, Last Rate: 0.04 mcg/kg/min (07/19/24 1800)  propofol, 5-50 mcg/kg/min, Last Rate: 5 mcg/kg/min (07/19/24 1800)    PRN medications  PRN medications: fentaNYL    Physical Exam  Constitutional:       General: She is not in acute distress.     Appearance: She is normal weight. She is ill-appearing. She is not  "toxic-appearing.   HENT:      Head: Normocephalic and atraumatic.      Mouth/Throat:      Comments: ET in place.   Eyes:      General: No scleral icterus.     Pupils: Pupils are equal, round, and reactive to light.      Comments: Pinpoint pupils.    Cardiovascular:      Rate and Rhythm: Normal rate and regular rhythm.      Heart sounds: No murmur heard.     No friction rub. No gallop.   Pulmonary:      Effort: No respiratory distress.      Breath sounds: No wheezing or rales.      Comments: Clear lung fields anteriorly with good air entry on MV. No wheezing or crackles.   Abdominal:      General: Bowel sounds are normal. There is no distension.      Palpations: Abdomen is soft.      Tenderness: There is no abdominal tenderness.   Musculoskeletal:      Cervical back: Neck supple. No rigidity or tenderness.      Right lower leg: No edema.      Left lower leg: No edema.   Lymphadenopathy:      Cervical: No cervical adenopathy.   Skin:     General: Skin is warm and dry.      Coloration: Skin is not jaundiced.   Neurological:      Comments: Cannot assess, currently is unresponsive.    Psychiatric:      Comments: Unresponsive, cannot assess.    Last Recorded Vitals  Blood pressure 136/84, pulse 75, temperature 35.9 °C (96.6 °F), temperature source Oral, resp. rate 24, height 1.702 m (5' 7\"), weight 100 kg (221 lb 1.9 oz), SpO2 92%.    Relevant Results  Results for orders placed or performed during the hospital encounter of 07/19/24 (from the past 24 hour(s))   POCT GLUCOSE   Result Value Ref Range    POCT Glucose 188 (H) 74 - 99 mg/dL   CBC and Auto Differential   Result Value Ref Range    WBC 14.7 (H) 4.4 - 11.3 x10*3/uL    nRBC 0.0 0.0 - 0.0 /100 WBCs    RBC 4.34 4.00 - 5.20 x10*6/uL    Hemoglobin 12.0 12.0 - 16.0 g/dL    Hematocrit 39.6 36.0 - 46.0 %    MCV 91 80 - 100 fL    MCH 27.6 26.0 - 34.0 pg    MCHC 30.3 (L) 32.0 - 36.0 g/dL    RDW 15.9 (H) 11.5 - 14.5 %    Platelets 246 150 - 450 x10*3/uL    Neutrophils % 63.8 " 40.0 - 80.0 %    Immature Granulocytes %, Automated 4.5 (H) 0.0 - 0.9 %    Lymphocytes % 26.9 13.0 - 44.0 %    Monocytes % 3.5 2.0 - 10.0 %    Eosinophils % 0.9 0.0 - 6.0 %    Basophils % 0.4 0.0 - 2.0 %    Neutrophils Absolute 9.40 (H) 1.60 - 5.50 x10*3/uL    Immature Granulocytes Absolute, Automated 0.66 (H) 0.00 - 0.50 x10*3/uL    Lymphocytes Absolute 3.96 (H) 0.80 - 3.00 x10*3/uL    Monocytes Absolute 0.52 0.05 - 0.80 x10*3/uL    Eosinophils Absolute 0.13 0.00 - 0.40 x10*3/uL    Basophils Absolute 0.06 0.00 - 0.10 x10*3/uL   Comprehensive metabolic panel   Result Value Ref Range    Glucose 233 (H) 65 - 99 mg/dL    Sodium 141 133 - 145 mmol/L    Potassium 3.0 (L) 3.4 - 5.1 mmol/L    Chloride 105 97 - 107 mmol/L    Bicarbonate 19 (L) 24 - 31 mmol/L    Urea Nitrogen 12 8 - 25 mg/dL    Creatinine 1.10 0.40 - 1.60 mg/dL    eGFR 53 (L) >60 mL/min/1.73m*2    Calcium 8.5 8.5 - 10.4 mg/dL    Albumin 3.5 3.5 - 5.0 g/dL    Alkaline Phosphatase 122 35 - 125 U/L    Total Protein 6.8 5.9 - 7.9 g/dL     (H) 5 - 40 U/L    Bilirubin, Total 0.5 0.1 - 1.2 mg/dL    ALT 91 (H) 5 - 40 U/L    Anion Gap 17 <=19 mmol/L   Ethanol   Result Value Ref Range    Alcohol <0.010 0.000 - 0.010 g/dL   Magnesium   Result Value Ref Range    Magnesium 2.40 1.60 - 3.10 mg/dL   Bilirubin, Direct   Result Value Ref Range    Bilirubin, Direct <0.2 0.0 - 0.2 mg/dL   Serial Troponin, Initial ([facility])   Result Value Ref Range    Troponin T, High Sensitivity 18 (HH) <=14 ng/L   TSH with reflex to Free T4 if abnormal   Result Value Ref Range    Thyroid Stimulating Hormone 2.98 0.27 - 4.20 mIU/L   APTT   Result Value Ref Range    aPTT 25.5 22.0 - 32.5 seconds   Protime-INR   Result Value Ref Range    Protime 11.0 9.3 - 12.7 seconds    INR 1.0 0.9 - 1.2   Type and screen   Result Value Ref Range    ABO TYPE O     Rh TYPE POS     ANTIBODY SCREEN NEG    ECG 12 lead   Result Value Ref Range    Ventricular Rate 93 BPM    Atrial Rate 141 BPM    QRS Duration  122 ms    QT Interval 396 ms    QTC Calculation(Bazett) 492 ms    P Axis 63 degrees    R Axis -45 degrees    T Axis 124 degrees    QRS Count 15 beats    Q Onset 209 ms    T Offset 407 ms    QTC Fredericia 458 ms   Blood Gas Arterial Full Panel   Result Value Ref Range    POCT pH, Arterial 7.22 (LL) 7.38 - 7.42 pH    POCT pCO2, Arterial 46 (H) 38 - 42 mm Hg    POCT pO2, Arterial 68 (L) 85 - 95 mm Hg    POCT SO2, Arterial 92 (L) 94 - 100 %    POCT Oxy Hemoglobin, Arterial 90.9 (L) 94.0 - 98.0 %    POCT Hematocrit Calculated, Arterial 35.0 (L) 36.0 - 46.0 %    POCT Sodium, Arterial 139 136 - 145 mmol/L    POCT Potassium, Arterial 3.0 (L) 3.5 - 5.3 mmol/L    POCT Chloride, Arterial 106 98 - 107 mmol/L    POCT Ionized Calcium, Arterial 1.10 1.10 - 1.33 mmol/L    POCT Glucose, Arterial 231 (H) 74 - 99 mg/dL    POCT Lactate, Arterial 4.8 (HH) 0.4 - 2.0 mmol/L    POCT Base Excess, Arterial -8.7 (L) -2.0 - 3.0 mmol/L    POCT HCO3 Calculated, Arterial 18.8 (L) 22.0 - 26.0 mmol/L    POCT Hemoglobin, Arterial 11.6 (L) 12.0 - 16.0 g/dL    POCT Anion Gap, Arterial 17 10 - 25 mmo/L    Patient Temperature 37.0 degrees Celsius    FiO2 100 %    Ventilator Mode      Ventilator Rate 14 bpm    Tidal Volume 450 mL    Peep CHM2O 8.0 cm H2O    Site of Arterial Puncture Radial Right     Eric's Test Positive    Drug Screen, Urine   Result Value Ref Range    Amphetamine Screen, Urine Negative      Barbiturate Screen, Urine Negative      Benzodiazepines Screen, Urine Negative      Cannabinoid Screen, Urine Negative      Cocaine Metabolite Screen, Urine Negative      Fentanyl Screen, Urine Negative       Methadone Screen, Urine Negative      Opiate Screen, Urine Negative      Oxycodone Screen, Urine Negative      PCP Screen, Urine Negative     Blood Gas Arterial Full Panel   Result Value Ref Range    POCT pH, Arterial 7.24 (LL) 7.38 - 7.42 pH    POCT pCO2, Arterial 47 (H) 38 - 42 mm Hg    POCT pO2, Arterial 89 85 - 95 mm Hg    POCT SO2,  Arterial 97 94 - 100 %    POCT Oxy Hemoglobin, Arterial 95.2 94.0 - 98.0 %    POCT Hematocrit Calculated, Arterial 37.0 36.0 - 46.0 %    POCT Sodium, Arterial 138 136 - 145 mmol/L    POCT Potassium, Arterial 3.4 (L) 3.5 - 5.3 mmol/L    POCT Chloride, Arterial 106 98 - 107 mmol/L    POCT Ionized Calcium, Arterial 1.15 1.10 - 1.33 mmol/L    POCT Glucose, Arterial 222 (H) 74 - 99 mg/dL    POCT Lactate, Arterial 2.9 (H) 0.4 - 2.0 mmol/L    POCT Base Excess, Arterial -7.2 (L) -2.0 - 3.0 mmol/L    POCT HCO3 Calculated, Arterial 20.1 (L) 22.0 - 26.0 mmol/L    POCT Hemoglobin, Arterial 12.3 12.0 - 16.0 g/dL    POCT Anion Gap, Arterial 15 10 - 25 mmo/L    Patient Temperature 37.0 degrees Celsius    FiO2 100 %    Ventilator Mode      Ventilator Rate 16 bpm    Tidal Volume 450 mL    Peep CHM2O 8.0 cm H2O    Site of Arterial Puncture Arterial Line     Eric's Test Positive    Transthoracic Echo (TTE) Complete   Result Value Ref Range    BSA 2.18 m2   Blood Gas Arterial Full Panel   Result Value Ref Range    POCT pH, Arterial 7.33 (L) 7.38 - 7.42 pH    POCT pCO2, Arterial 43 (H) 38 - 42 mm Hg    POCT pO2, Arterial 179 (H) 85 - 95 mm Hg    POCT SO2, Arterial 99 94 - 100 %    POCT Oxy Hemoglobin, Arterial 96.9 94.0 - 98.0 %    POCT Hematocrit Calculated, Arterial 38.0 36.0 - 46.0 %    POCT Sodium, Arterial 137 136 - 145 mmol/L    POCT Potassium, Arterial 3.3 (L) 3.5 - 5.3 mmol/L    POCT Chloride, Arterial 106 98 - 107 mmol/L    POCT Ionized Calcium, Arterial 1.15 1.10 - 1.33 mmol/L    POCT Glucose, Arterial 263 (H) 74 - 99 mg/dL    POCT Lactate, Arterial 2.1 (H) 0.4 - 2.0 mmol/L    POCT Base Excess, Arterial -3.2 (L) -2.0 - 3.0 mmol/L    POCT HCO3 Calculated, Arterial 22.7 22.0 - 26.0 mmol/L    POCT Hemoglobin, Arterial 12.6 12.0 - 16.0 g/dL    POCT Anion Gap, Arterial 12 10 - 25 mmo/L    Patient Temperature 37.0 degrees Celsius    FiO2 100 %    Ventilator Mode Other     Ventilator Rate 20 bpm    Tidal Volume 450 mL    Peep CHM2O  8.0 cm H2O    Site of Arterial Puncture Arterial Line     Eric's Test Negative    XR chest 1 view    Result Date: 7/19/2024  Interpreted By:  Katie Gilliam, STUDY: XR CHEST 1 VIEW 7/19/2024 3:36 pm   INDICATION: Triple-lumen catheter placement   COMPARISON: 07/19/2024 done at 1:17 p.m.   ACCESSION NUMBER(S): [id number]   ORDERING CLINICIAN: ZULEMA MICHAEL   TECHNIQUE: Recumbent view of the chest at bedside   FINDINGS: A right jugular CVP line has been placed with tip terminating in the SVC. There is no pneumothorax.   Endotracheal tube remains satisfactorily positioned with tip 4.8 cm above kolby. Nasogastric tube descends below diaphragm.   The cardiac size is stable. Bilateral pulmonary infiltrates are once again observed and appear a little worse on the left but stable on the right.       Right jugular CVP line terminating in SVC without pneumothorax.   Bilateral pulmonary infiltrates stable on the right but worsening on the left.   Signed by: Katie Gilliam 7/19/2024 3:58 PM Dictation workstation:   [id number]    CT head wo IV contrast    Result Date: 7/19/2024  Interpreted By:  Cristal Sullivan, STUDY: CT HEAD WO IV CONTRAST;  7/19/2024 2:52 pm   INDICATION: Signs/Symptoms:AMS, OSH cardiac arrest.   COMPARISON: None.   ACCESSION NUMBER(S): [id number]   ORDERING CLINICIAN: FLETCHER ABREU   TECHNIQUE: Noncontrast axial CT scan of head was performed.   FINDINGS: Parenchyma: No intracranial hemorrhage. No midline shift. Small hypodensity in the right frontal lobe with blurring of gray-white junction (series 10, image 26). There is diffuse sulcal effacement along the right cerebral hemisphere relative to the left.   CSF Spaces: The ventricles, sulci and basal cisterns are within normal limits for age.   Extra-Axial Fluid: None.   Calvarium: No acute fracture.   Paranasal sinuses: Mucosal thickening of left frontal sinus. Near-complete opacification of ethmoid air cells and complete opacification of left  maxillary sinus. Small air-fluid level in the right maxillary sinus.   Mastoids: Clear.   Orbits: No acute abnormality.   Soft tissues: No acute abnormality.       Small right frontal lobe hypodensity with blurring of gray-white junction, concerning for possible acute infarct.   Diffuse sulcal effacement along the right cerebral hemisphere relative to the left suggesting cerebral edema. No midline shift, ventricular or sulcal effacement.   MACRO Cristal Sullivan discussed the significance and urgency of this critical finding by Epic secure chat with  FLETCHER ABREU on 7/19/2024 at 3:29 pm.  (**-RCF-**) Findings:  See findings.     Signed by: Cristal Sullivan 7/19/2024 3:30 PM Dictation workstation:   [id number]    ECG 12 lead    Result Date: 7/19/2024  Sinus tachycardia with 2nd degree AV block (Mobitz I) with occasional Premature ventricular complexes Left axis deviation Abnormal ECG No previous ECGs available    XR chest 1 view    Result Date: 7/19/2024  Interpreted By:  Dario Khalil, STUDY: XR CHEST 1 VIEW;  7/19/2024 1:27 pm   INDICATION: Signs/Symptoms:Post intubation.   COMPARISON: None.   ACCESSION NUMBER(S): [id number]   ORDERING CLINICIAN: FLETCHER ABREU   FINDINGS: ET tube 2.4 cm above the kolby. Feeding tube tip passes below the GE junction   CARDIOMEDIASTINAL SILHOUETTE: Cardiomediastinal silhouette is normal in size and configuration.   LUNGS: Extensive dense airspace disease at the lungs especially in the right upper lobe. A component of mild edema is present.   ABDOMEN: No remarkable upper abdominal findings.   BONES: No acute osseous changes.       1.  Extensive bilateral multifocal airspace disease worse at the right upper lung. Underlying pneumonia or atelectasis in the differential. 2. ET tube and feeding tube in place.       MACRO: None   Signed by: Dario Khalil 7/19/2024 1:41 PM Dictation workstation:   [id number]    Assessment/Plan   73 YOF with h/o HTN, DLP, gout, who was found  unresponsive at home, EMS was called and arrived 10-20 min later. Upon their arrival, patient pulseless, BLS and ACLS was initiated. ROSC after 2 shock and 1 epi. Igel was placed, BMV until arrival to the ED. In the ED work up revealed severe mixed acidosis, hyperglycemia, mild leukocytosis, was intubated and placed on MV. Also hypotensive requiring Levophed.     Neuro: acute metabolic encephalopathy post arrest. Currently cannot fully assess given intubated and sedated. CT head concerning for acute frontal lobe infarct associated with mild R cerebral edema.        Continue sedation with propofol and fentanyl       Repeat head CT in 6-12 hours.        Neuro consult       Consider MRI when more stable.        Will need secondary prevention    CV: h/o HTN, s/p cardiac arrest, now with shock, etiology unclear, septic vs cardiogenic. On presentation mildly elevated troponin. Likely demand ischemia. Seen by cardiologist. Bedside echo showed EF 50%, with possible diastolic dysfunction. Currently is requiring low dose Levophed.        Continue Levophed, wean down as tolerates       Continue to monitor       Will avoid hyperthermia/fever, target temperature 36       Hold home amlodipine, can resume atorvastatin on 7/20.     Respiratory: acute hypoxic hypercarbic respiratory failure, s/p intubation on 7/19. ABG improved on vent.       Continue MV: 450/20/8/100%, wean off as tolerates       ABG       BPH         : lactic acidosis on presentation associated with metabolic acidosis, now is improving. Cr WNL. No major electrolyte abnormalities       Is/Os       Repeat RFP now       Daily RFP       Treat electrolyte abnormalities as indicated       Trend LA     GI: no acute issues.        NPO for now       Bowel regimen       GI prophylaxis while on MV    Endo: no major issues. BS within acceptable range        FS every 4 hours        SSI        Hypoglycemic protocol    Hem/Onc: mild anemia. Otherwise no acute issues         Continue to monitor with daily CBC    ID: mild leukocytosis, CXR with pulmonary infiltrates, overall some concern for sepsis.        Will start broad spectrum antibiotics with Vancomycin and Zosyn, consider adding azithromycin        FU cultures          DVT prophylaxis: subcutaneous heparin.   Lines: [facility]     This was a critical care visit for post arrest care. Total time 60 min.     Jonathon Vásquez MD

## 2024-07-19 NOTE — Clinical Note
The DEFIBRILLATOR, ICD, SERGIO SMITH DR, DF4 DUAL CHAMBER - [id number] device was inserted. The leads were placed into the connector and visually verified to be in correct position. Injury current obtained.

## 2024-07-19 NOTE — ED PROVIDER NOTES
HPI   Chief Complaint   Patient presents with    Cardiac Arrest       Patient arrived by EMS for altered mental status and evidence of cardiac arrest.   found patient unresponsive and questionably did not perform bystander CPR and called 911.  Estimated downtime 10 to 20 minutes prior to EMS arrival. EMS initiated ACLS, received 2 shocks and 1 round of Epi with spontaneous ROSC.   No significant mental improvement, Igel  in  place and  BVM  being performed. BG  140s, BP 100s/50s. No obvious trauma or substance use.      History provided by:  EMS personnel          Patient History   History reviewed. No pertinent past medical history.  History reviewed. No pertinent surgical history.  No family history on file.  Social History     Tobacco Use    Smoking status: Never     Passive exposure: Never ([facility] - pt intubated and sedated)    Smokeless tobacco: Never   Substance Use Topics    Alcohol use: Not on file    Drug use: Not on file       Physical Exam   ED Triage Vitals   Temp Heart Rate Respirations BP   -- 07/19/24 1251 07/19/24 1251 07/19/24 1251    (!) 104 15 (!) 140/128      Pulse Ox Temp src Heart Rate Source Patient Position   07/19/24 1251 -- 07/19/24 1251 07/19/24 1307   98 %  Monitor Lying      BP Location FiO2 (%)     07/19/24 1307 --     Left arm        Physical Exam  Vitals and nursing note reviewed.   Constitutional:       General: She is not in acute distress.     Appearance: Normal appearance. She is normal weight. She is not ill-appearing.   HENT:      Nose: Nose normal.      Mouth/Throat:      Mouth: Mucous membranes are moist.      Pharynx: Oropharynx is clear.   Eyes:      General: No scleral icterus.     Pupils: Pupils are equal, round, and reactive to light.   Cardiovascular:      Rate and Rhythm: Normal rate.   Pulmonary:      Effort: Pulmonary effort is normal.   Skin:     General: Skin is warm and dry.   Neurological:      General: No focal deficit present.      Mental Status: She is  alert and oriented to person, place, and time.           ED Course & MDM   ED Course as of 07/20/24 0750   Fri Jul 19, 2024   1308 I personally reviewed and interpreted the EKG @ 1309: NSR 93, LAD, 2nd deg AVB (Mobitz Type 1), and prior EKG reviewed without any appreciable specific/identifiable changes   [BC]   Sat Jul 20, 2024   0744 TSH with reflex to Free T4 if abnormal  WNL [BC]   0744 Bilirubin, Direct  WNL [BC]   0744 Magnesium  WNL [BC]   0744 Ethanol  Undetectable in the setting of cardiac arrest [BC]   0744 Drug Screen, Urine  Undetectable in the setting of cardiac arrest [BC]   0745 APTT  No coagulopathy in the setting of cardiac arrest. [BC]   0745 Protime-INR  No coagulopathy in the setting of cardiac arrest. [BC]   0745 Comprehensive metabolic panel(!)  Mild hypokalemia and moderate hyperglycemia without evidence of significant AGMA, mild transaminitis likely associated with hepatic ischemia setting of cardiac arrest [BC]   0746 CBC and Auto Differential(!)  Mild to moderate leukocytosis in the setting of cardiac arrest and likely stress-induced catecholamine surge demargination, could be inflammatory responses [BC]   0746 Troponin T Series, High Sensitivity (0, 2HR, 6HR)(!!)  Moderately elevated above upper limit the same cardiac risk, EKG unremarkable for ischemic changes other than significant ectopy, appears inconsistent with recent ACLS resuscitative efforts and defibrillation [BC]   0749 Discussed patient with Dr. Rojo (cardiology) given out of hospital cardiac arrest with significant ectopy concerning for ACS, requested echo to be ordered for him to review while further discussion but would like patient and MICU for now [BC]      ED Course User Index  [BC] Jon Denis MD         Diagnoses as of 07/20/24 0750   Cardiac arrest (Multi)   Hypotension, unspecified hypotension type   Acute heart failure, unspecified heart failure type (Multi)   Altered mental status, unspecified altered  mental status type                       [place] Coma Scale Score: 5                        Medical Decision Making  Patient presented to the ED for cardiac arrest with concerning PMHx of HTN.  I personally reviewed and interpreted VS, labs, images, and EKG which are as stated above in the ED course.    Assessment/evaluation out-of-hospital cardiac arrest of unclear etiology requiring ACLS and resuscitative efforts.  No concerning history, clinical evidence/work-up, or exam findings for the concerning differentials of significant electrolyte/metabolic derangements, endocrine derangement infectious processes/encephalopathy, traumatic encephalopathy, substance intoxication/ingestion, nontraumatic ICH, STEMI/NSTEMI.  These conditions have been thoroughly evaluated and determined to be sufficiently unlikely to be the etiology of patient's presenting symptoms.    ED Events:  Upon arrival in the ED patient was transferred to the ED bed and RT began bagging patient with i-gel in place, patient was placed on monitor demonstrated irregular rhythm with significant ectopy, pads were placed, and IV access was achieved.  Incision was made to secure the airway and maximize patient with fluid resuscitation given downtrending BP.  20 mg push dose epi given for BP support prior to intubation, patient was sniffing oxygenating with BVM to prevent hypoxia during procedure.  Patient was also given amiodarone 150 mg bolus for significant ectopy status post arrest.  Patient was successfully intubated although insidiously became hypotensive refractory to 30 cc/kg fluid resuscitation concerning for underlying infection/sepsis, subsequently started on nor epi with increasing rate for appropriate BP support.  Once BP support was established gradual sedation with propofol and fentanyl were added.  Arterial line and central line were placed for further monitoring of patient's vitals and access for supportive medications and fluids.  Patient was  ultimately transferred to MICU in stable but critical condition.      After receiving an appropriate exam, clinical work-up, and necessary interventions/treatment, Patient is appropriate for hospital admission to MICU  at this time due to concern for acute decompensation, pain control/management, and further interrogation/management of symptoms.  Patient was encouraged to ask any questions or for clarification of today's ED encounter.  Patient is agreeable to plan of care.    Per Chart Review: Nothing pertinent to this ED encounter.    Problems Addressed:  Altered mental status, unspecified altered mental status type: acute illness or injury with systemic symptoms  Cardiac arrest (Multi): acute illness or injury that poses a threat to life or bodily functions  Hypotension, unspecified hypotension type: acute illness or injury with systemic symptoms    Amount and/or Complexity of Data Reviewed  Labs: ordered. Decision-making details documented in ED Course.  Radiology: ordered and independent interpretation performed. Decision-making details documented in ED Course.  ECG/medicine tests: ordered and independent interpretation performed. Decision-making details documented in ED Course.  Discussion of management or test interpretation with external provider(s): See ED course          Procedure  Central Line    Performed by: Jon Denis MD  Authorized by: Jon Denis MD    Consent:     Consent obtained:  Emergent situation  Universal protocol:     Patient identity confirmation method: performed emergently.  Pre-procedure details:     Indication(s): central venous access and insufficient peripheral access      Hand hygiene: Hand hygiene performed prior to insertion      Sterile barrier technique: All elements of maximal sterile technique followed      Skin preparation:  Chlorhexidine    Skin preparation agent: Skin preparation agent completely dried prior to procedure    Sedation:     Sedation type:   None  Anesthesia:     Anesthesia method:  None  Procedure details:     Location:  R internal jugular    Patient position:  Supine    Procedural supplies:  Triple lumen    Catheter size:  7 Fr    Landmarks identified: yes      Ultrasound guidance: yes      Ultrasound guidance timing: real time      Sterile ultrasound techniques: Sterile gel and sterile probe covers were used      Number of attempts:  1    Successful placement: yes    Post-procedure details:     Post-procedure:  Dressing applied and line sutured    Assessment:  Blood return through all ports, no pneumothorax on x-ray, free fluid flow and placement verified by x-ray    Procedure completion:  Tolerated  Intubation    Performed by: Jon Denis MD  Authorized by: Jon Denis MD    Consent:     Consent obtained:  Emergent situation  Universal protocol:     Patient identity confirmation method: performed emergently.  Pre-procedure details:     Indications: airway protection, altered consciousness and cardio/pulmonary arrest      Patient status:  Unresponsive    Look externally: no concerns      Mouth opening - incisor distance:  2 finger widths    Hyoid-mental distance: 3 or more finger widths      Hyoid-thyroid distance: 2 or more finger widths      Mallampati score:  III    Obstruction: none      Neck mobility: normal      Pharmacologic strategy: RSI      Induction agents:  Etomidate    Paralytics:  Succinylcholine  Procedure details:     Preoxygenation:  Bag valve mask    CPR in progress: no      Number of attempts:  1  Successful intubation attempt details:     Intubation technique: video assisted      Laryngoscope blade:  Hypercurved    Bougie used: no      Grade view: II      Tube size (mm):  7.5    Tube type:  Cuffed    Tube visualized through cords: yes    Placement assessment:     ETT at teeth/gumline (cm):  24    Tube secured with:  ETT burns    Breath sounds:  Equal and absent over the epigastrium    Placement verification: chest  rise, CXR verification, direct visualization, equal breath sounds and numeric ETCO2      CXR findings:  Appropriate position  Post-procedure details:     Complications: hypotension    Insert arterial line    Performed by: Jon Denis MD  Authorized by: Jon Denis MD    Consent:     Consent obtained:  Emergent situation  Universal protocol:     Patient identity confirmation method: performed emergently.  Indications:     Indications: hemodynamic monitoring    Pre-procedure details:     Skin preparation:  Chlorhexidine  Sedation:     Sedation type:  None  Anesthesia:     Anesthesia method:  None  Procedure details:     Location:  L radial    Eric's test performed: no      Needle gauge:  20 G    Placement technique:  Seldinger and ultrasound guided    Number of attempts:  1    Transducer: waveform confirmed    Post-procedure details:     Post-procedure:  Sutured and sterile dressing applied    CMS:  Normal and unable to assess  Critical Care    Performed by: Jon Denis MD  Authorized by: Jon Denis MD    Critical care provider statement:     Critical care time (minutes):  120    Critical care start time:  7/19/2024 12:51 PM    Critical care end time:  7/19/2024 3:30 PM    Critical care time was exclusive of:  Separately billable procedures and treating other patients    Critical care was necessary to treat or prevent imminent or life-threatening deterioration of the following conditions:  Cardiac failure, respiratory failure, circulatory failure, sepsis and shock    Critical care was time spent personally by me on the following activities:  Ordering and performing treatments and interventions, ordering and review of laboratory studies, ordering and review of radiographic studies, pulse oximetry, re-evaluation of patient's condition, vascular access procedures and ventilator management    Care discussed with: admitting provider         Jon Denis MD  07/20/24 0750

## 2024-07-19 NOTE — CARE PLAN
The patient's goals for the shift include      The clinical goals for the shift include maintain hemodynamic stability    Over the shift, the patient did make progress toward the following goals. Barriers to progression include impaired neurologic function.   Problem: Knowledge Deficit  Goal: Patient/family/caregiver demonstrates understanding of disease process, treatment plan, medications, and discharge instructions  Outcome: Progressing  Flowsheets (Taken 7/19/2024 1804)  Patient/family/caregiver demonstrates understanding of disease process, treatment plan, medications, and discharge instructions:   Complete learning assessment and assess knowledge base   Provide teaching at level of understanding   Provide teaching via preferred learning methods     Problem: Mechanical Ventilation  Goal: Patient Will Maintain Patent Airway  Outcome: Progressing  Flowsheets (Taken 7/19/2024 1804)  Patient Will Maintain Patent Airway:   Assess and monitor airway secretions and suction as needed per patient's condition and according to policy   Hyper oxygenate with 100% FiO2 prior to suctioning   Suctioning secretions as indicated to maintain patent airway   Elevate head of bed 30 degrees if patient has tube feeding  Goal: Oral health is maintained or improved  Outcome: Progressing  Flowsheets (Taken 7/19/2024 1804)  Oral Health is Maintained or Improved:   Assess and monitor condition of lips and mouth and perform oral care per hospital policy   Perform oral care with an oral swab   Apply water-based moisturizer to lips   Suction oral pharynx  Goal: ET tube will be managed safely  Outcome: Progressing  Flowsheets (Taken 7/19/2024 1804)  Endotracheal Tube Will Be Managed Safely:   Assess and monitor insertion depth at lip/nare line   Utilize ETT securing device and change as necessary to ensure ETT is properly secured to patient   Support ventilator tubing to avoid pressure from drag of tubing   Keep ambu bag, mask, oxygen  connection tubing, and extra ETT at bedside and accompanying patient at all times   Utilize endotracheal tube securing device   Reposition endotracheal tube to opposite side of mouth  Goal: Ability to express needs and understand communication  Outcome: Progressing  Flowsheets (Taken 7/19/2024 1804)  Ability to express needs and understand communication:   Assess and monitor patient's ability to understand information and communicate   Implement interventions to promote patient's ability to communicate   Encourage patient to communicate   Provide patient with clipboard and pen/pencil   Keep alphabet and symbol forms within reach  Goal: Mobility/activity is maintained at optimum level for patient  Outcome: Progressing  Flowsheets (Taken 7/19/2024 1804)  Mobility/Activity is Maintained at Optimum Level for Patient: Place patient in sitting position if BP is stable     Problem: Pain - Adult  Goal: Verbalizes/displays adequate comfort level or baseline comfort level  Outcome: Progressing  Flowsheets (Taken 7/19/2024 1804)  Verbalizes/displays adequate comfort level or baseline comfort level:   Encourage patient to monitor pain and request assistance   Assess pain using appropriate pain scale   Administer analgesics based on type and severity of pain and evaluate response   Implement non-pharmacological measures as appropriate and evaluate response   Consider cultural and social influences on pain and pain management   Notify Licensed Independent Practitioner if interventions unsuccessful or patient reports new pain     Problem: Safety - Adult  Goal: Free from fall injury  Outcome: Progressing  Flowsheets (Taken 7/19/2024 1804)  Free from fall injury: Instruct family/caregiver on patient safety     Problem: Discharge Planning  Goal: Discharge to home or other facility with appropriate resources  Outcome: Progressing  Flowsheets (Taken 7/19/2024 1804)  Discharge to home or other facility with appropriate resources:    Identify barriers to discharge with patient and caregiver   Arrange for needed discharge resources and transportation as appropriate   Identify discharge learning needs (meds, wound care, etc)   Arrange for interpreters to assist at discharge as needed   Refer to discharge planning if patient needs post-hospital services based on physician order or complex needs related to functional status, cognitive ability or social support system     Problem: Chronic Conditions and Co-morbidities  Goal: Patient's chronic conditions and co-morbidity symptoms are monitored and maintained or improved  Outcome: Progressing  Flowsheets (Taken 7/19/2024 1804)  Care Plan - Patient's Chronic Conditions and Co-Morbidity Symptoms are Monitored and Maintained or Improved:   Monitor and assess patient's chronic conditions and comorbid symptoms for stability, deterioration, or improvement   Collaborate with multidisciplinary team to address chronic and comorbid conditions and prevent exacerbation or deterioration   Update acute care plan with appropriate goals if chronic or comorbid symptoms are exacerbated and prevent overall improvement and discharge     Problem: Skin  Goal: Decreased wound size/increased tissue granulation at next dressing change  Outcome: Progressing  Flowsheets (Taken 7/19/2024 1804)  Decreased wound size/increased tissue granulation at next dressing change:   Promote sleep for wound healing   Utilize specialty bed per algorithm   Protective dressings over bony prominences  Goal: Participates in plan/prevention/treatment measures  Outcome: Progressing  Flowsheets (Taken 7/19/2024 1804)  Participates in plan/prevention/treatment measures:   Discuss with provider PT/OT consult   Increase activity/out of bed for meals   Elevate heels  Goal: Prevent/manage excess moisture  Outcome: Progressing  Flowsheets (Taken 7/19/2024 1804)  Prevent/manage excess moisture:   Cleanse incontinence/protect with barrier cream   Moisturize  dry skin   Follow provider orders for dressing changes   Monitor for/manage infection if present  Goal: Prevent/minimize sheer/friction injuries  Outcome: Progressing  Flowsheets (Taken 7/19/2024 1804)  Prevent/minimize sheer/friction injuries:   Complete micro-shifts as needed if patient unable. Adjust patient position to relieve pressure points, not a full turn   HOB 30 degrees or less   Increase activity/out of bed for meals   Turn/reposition every 2 hours/use positioning/transfer devices   Use pull sheet   Utilize specialty bed per algorithm  Goal: Promote/optimize nutrition  Outcome: Progressing  Flowsheets (Taken 7/19/2024 1804)  Promote/optimize nutrition:   Assist with feeding   Discuss with provider if NPO > 2 days   Offer water/supplements/favorite foods   Consume > 50% meals/supplements   Monitor/record intake including meals   Reassess MST if dietician not consulted  Goal: Promote skin healing  Outcome: Progressing  Flowsheets (Taken 7/19/2024 1804)  Promote skin healing:   Assess skin/pad under line(s)/device(s)   Ensure correct size (line/device) and apply per  instructions   Protective dressings over bony prominences   Rotate device position/do not position patient on device   Turn/reposition every 2 hours/use positioning/transfer devices     Problem: Nutrition  Goal: Less than 5 days NPO/clear liquids  Outcome: Progressing  Goal: Oral intake greater than 50%  Outcome: Progressing  Goal: Oral intake greater 75%  Outcome: Progressing  Goal: Consume prescribed supplement  Outcome: Progressing  Goal: Adequate PO fluid intake  Outcome: Progressing  Goal: Nutrition support goals are met within 48 hrs  Outcome: Progressing  Goal: Nutrition support is meeting 75% of nutrient needs  Outcome: Progressing  Goal: Tube feed tolerance  Outcome: Progressing  Goal: BG  mg/dL  Outcome: Progressing  Goal: Lab values WNL  Outcome: Progressing  Goal: Electrolytes WNL  Outcome: Progressing  Goal:  Promote healing  Outcome: Progressing  Goal: Maintain stable weight  Outcome: Progressing     Problem: Fall/Injury  Goal: Not fall by end of shift  Outcome: Progressing  Goal: Be free from injury by end of the shift  Outcome: Progressing  Goal: Verbalize understanding of personal risk factors for fall in the hospital  Outcome: Progressing  Goal: Verbalize understanding of risk factor reduction measures to prevent injury from fall in the home  Outcome: Progressing  Goal: Use assistive devices by end of the shift  Outcome: Progressing  Goal: Pace activities to prevent fatigue by end of the shift  Outcome: Progressing     Problem: Safety - Medical Restraint  Goal: Remains free of injury from restraints (Restraint for Interference with Medical Device)  Outcome: Progressing  Flowsheets (Taken 7/19/2024 1804)  Remains free of injury from restraints (restraint for interference with medical device):   Determine that other, less restrictive measures have been tried or would not be effective before applying the restraint   Evaluate the patient's condition at the time of restraint application   Inform patient/family regarding the reason for restraint   Every 2 hours: Monitor safety, psychosocial status, comfort, nutrition and hydration  Goal: Free from restraint(s) (Restraint for Interference with Medical Device)  Outcome: Progressing  Flowsheets (Taken 7/19/2024 1804)  Free from restraint(s) (restraint for interference with medical device):   ONCE/SHIFT or MINIMUM Every 12 hours: Assess and document the continuing need for restraints   Every 24 hours: Continued use of restraint requires Licensed Independent Practitioner to perform face to face examination and written order   Identify and implement measures to help patient regain control

## 2024-07-19 NOTE — ED TRIAGE NOTES
Pt presents to ED from home for being found unresponsive by . Upon EMS arrival they started CPR. Patient was in Vfib and received 2 shocks and one round of epi. ROSC was achieved.

## 2024-07-20 LAB
25(OH)D3 SERPL-MCNC: 17 NG/ML (ref 31–100)
ANION GAP BLDA CALCULATED.4IONS-SCNC: 8 MMO/L (ref 10–25)
ANION GAP BLDV CALCULATED.4IONS-SCNC: 11 MMOL/L (ref 10–25)
ANION GAP BLDV CALCULATED.4IONS-SCNC: 13 MMOL/L (ref 10–25)
ANION GAP SERPL CALC-SCNC: 9 MMOL/L
APPARATUS: ABNORMAL
ARTERIAL PATENCY WRIST A: POSITIVE
BASE EXCESS BLDA CALC-SCNC: -2.1 MMOL/L (ref -2–3)
BASE EXCESS BLDV CALC-SCNC: -2.6 MMOL/L (ref -2–3)
BASE EXCESS BLDV CALC-SCNC: -3.7 MMOL/L (ref -2–3)
BASOPHILS # BLD AUTO: 0.05 X10*3/UL (ref 0–0.1)
BASOPHILS NFR BLD AUTO: 0.2 %
BODY TEMPERATURE: 37 DEGREES CELSIUS
BUN SERPL-MCNC: 16 MG/DL (ref 8–25)
CA-I BLDA-SCNC: 1.17 MMOL/L (ref 1.1–1.33)
CA-I BLDV-SCNC: 1.2 MMOL/L (ref 1.1–1.33)
CA-I BLDV-SCNC: 1.25 MMOL/L (ref 1.1–1.33)
CALCIUM SERPL-MCNC: 8.4 MG/DL (ref 8.5–10.4)
CHLORIDE BLDA-SCNC: 109 MMOL/L (ref 98–107)
CHLORIDE BLDV-SCNC: 106 MMOL/L (ref 98–107)
CHLORIDE BLDV-SCNC: 106 MMOL/L (ref 98–107)
CHLORIDE SERPL-SCNC: 107 MMOL/L (ref 97–107)
CO2 SERPL-SCNC: 23 MMOL/L (ref 24–31)
CREAT SERPL-MCNC: 1.5 MG/DL (ref 0.4–1.6)
EGFRCR SERPLBLD CKD-EPI 2021: 37 ML/MIN/1.73M*2
EOSINOPHIL # BLD AUTO: 0 X10*3/UL (ref 0–0.4)
EOSINOPHIL NFR BLD AUTO: 0 %
ERYTHROCYTE [DISTWIDTH] IN BLOOD BY AUTOMATED COUNT: 16 % (ref 11.5–14.5)
GLUCOSE BLD MANUAL STRIP-MCNC: 119 MG/DL (ref 74–99)
GLUCOSE BLD MANUAL STRIP-MCNC: 136 MG/DL (ref 74–99)
GLUCOSE BLD MANUAL STRIP-MCNC: 142 MG/DL (ref 74–99)
GLUCOSE BLD MANUAL STRIP-MCNC: 89 MG/DL (ref 74–99)
GLUCOSE BLD MANUAL STRIP-MCNC: 97 MG/DL (ref 74–99)
GLUCOSE BLD MANUAL STRIP-MCNC: 99 MG/DL (ref 74–99)
GLUCOSE BLDA-MCNC: 162 MG/DL (ref 74–99)
GLUCOSE BLDV-MCNC: 120 MG/DL (ref 74–99)
GLUCOSE BLDV-MCNC: 122 MG/DL (ref 74–99)
GLUCOSE SERPL-MCNC: 160 MG/DL (ref 65–99)
HCO3 BLDA-SCNC: 22.3 MMOL/L (ref 22–26)
HCO3 BLDV-SCNC: 23.2 MMOL/L (ref 22–26)
HCO3 BLDV-SCNC: 24.4 MMOL/L (ref 22–26)
HCT VFR BLD AUTO: 37.5 % (ref 36–46)
HCT VFR BLD EST: 36 % (ref 36–46)
HCT VFR BLD EST: 36 % (ref 36–46)
HCT VFR BLD EST: 37 % (ref 36–46)
HGB BLD-MCNC: 11.8 G/DL (ref 12–16)
HGB BLDA-MCNC: 12 G/DL (ref 12–16)
HGB BLDV-MCNC: 12 G/DL (ref 12–16)
HGB BLDV-MCNC: 12.4 G/DL (ref 12–16)
IMM GRANULOCYTES # BLD AUTO: 0.18 X10*3/UL (ref 0–0.5)
IMM GRANULOCYTES NFR BLD AUTO: 0.8 % (ref 0–0.9)
INHALED O2 CONCENTRATION: 40 %
INHALED O2 CONCENTRATION: 40 %
INHALED O2 CONCENTRATION: 50 %
LACTATE BLDA-SCNC: 1.2 MMOL/L (ref 0.4–2)
LACTATE BLDV-SCNC: 1.4 MMOL/L (ref 0.4–2)
LACTATE BLDV-SCNC: 1.8 MMOL/L (ref 0.4–2)
LYMPHOCYTES # BLD AUTO: 1.05 X10*3/UL (ref 0.8–3)
LYMPHOCYTES NFR BLD AUTO: 4.9 %
MAGNESIUM SERPL-MCNC: 2.4 MG/DL (ref 1.6–3.1)
MCH RBC QN AUTO: 28 PG (ref 26–34)
MCHC RBC AUTO-ENTMCNC: 31.5 G/DL (ref 32–36)
MCV RBC AUTO: 89 FL (ref 80–100)
MONOCYTES # BLD AUTO: 0.85 X10*3/UL (ref 0.05–0.8)
MONOCYTES NFR BLD AUTO: 4 %
NEUTROPHILS # BLD AUTO: 19.28 X10*3/UL (ref 1.6–5.5)
NEUTROPHILS NFR BLD AUTO: 90.1 %
NRBC BLD-RTO: 0 /100 WBCS (ref 0–0)
OXYHGB MFR BLDA: 96.5 % (ref 94–98)
OXYHGB MFR BLDV: 72 % (ref 45–75)
OXYHGB MFR BLDV: 72.9 % (ref 45–75)
PC PRESSURE CONTROL: 5 CM H2O
PCO2 BLDA: 36 MM HG (ref 38–42)
PCO2 BLDV: 43 MM HG (ref 41–51)
PCO2 BLDV: 57 MM HG (ref 41–51)
PEEP CMH2O: 5 CM H2O
PEEP CMH2O: 5 CM H2O
PEEP CMH2O: 8 CM H2O
PH BLDA: 7.4 PH (ref 7.38–7.42)
PH BLDV: 7.24 PH (ref 7.33–7.43)
PH BLDV: 7.34 PH (ref 7.33–7.43)
PHOSPHATE SERPL-MCNC: 3.3 MG/DL (ref 2.5–4.5)
PLATELET # BLD AUTO: 232 X10*3/UL (ref 150–450)
PO2 BLDA: 109 MM HG (ref 85–95)
PO2 BLDV: 45 MM HG (ref 35–45)
PO2 BLDV: 48 MM HG (ref 35–45)
POTASSIUM BLDA-SCNC: 4.2 MMOL/L (ref 3.5–5.3)
POTASSIUM BLDV-SCNC: 3.9 MMOL/L (ref 3.5–5.3)
POTASSIUM BLDV-SCNC: 3.9 MMOL/L (ref 3.5–5.3)
POTASSIUM SERPL-SCNC: 4.4 MMOL/L (ref 3.4–5.1)
PRESSURE SUPPORT: 5 CM H2O
RBC # BLD AUTO: 4.22 X10*6/UL (ref 4–5.2)
SAO2 % BLDA: 98 % (ref 94–100)
SAO2 % BLDV: 73 % (ref 45–75)
SAO2 % BLDV: 75 % (ref 45–75)
SODIUM BLDA-SCNC: 135 MMOL/L (ref 136–145)
SODIUM BLDV-SCNC: 137 MMOL/L (ref 136–145)
SODIUM BLDV-SCNC: 138 MMOL/L (ref 136–145)
SODIUM SERPL-SCNC: 139 MMOL/L (ref 133–145)
SPECIMEN DRAWN FROM PATIENT: ABNORMAL
SPONTANEOUS TIDAL VOLUME: 343 ML
SPONTANEOUS TIDAL VOLUME: 463 ML
TIDAL VOLUME: 445 ML
TIDAL VOLUME: 450 ML
TROPONIN T SERPL-MCNC: 47 NG/L
TROPONIN T SERPL-MCNC: 78 NG/L
VANCOMYCIN SERPL-MCNC: 15.7 UG/ML (ref 10–20)
VENTILATOR MODE: ABNORMAL
VENTILATOR RATE: 22 BPM
VENTILATOR RATE: 23 BPM
VENTILATOR RATE: 35 BPM
VIT B12 SERPL-MCNC: 627 PG/ML (ref 211–946)
WBC # BLD AUTO: 21.4 X10*3/UL (ref 4.4–11.3)

## 2024-07-20 PROCEDURE — 94799 UNLISTED PULMONARY SVC/PX: CPT

## 2024-07-20 PROCEDURE — 94003 VENT MGMT INPAT SUBQ DAY: CPT

## 2024-07-20 PROCEDURE — 82607 VITAMIN B-12: CPT | Performed by: PSYCHIATRY & NEUROLOGY

## 2024-07-20 PROCEDURE — 84132 ASSAY OF SERUM POTASSIUM: CPT | Performed by: INTERNAL MEDICINE

## 2024-07-20 PROCEDURE — 83735 ASSAY OF MAGNESIUM: CPT

## 2024-07-20 PROCEDURE — 82306 VITAMIN D 25 HYDROXY: CPT | Performed by: PSYCHIATRY & NEUROLOGY

## 2024-07-20 PROCEDURE — 84484 ASSAY OF TROPONIN QUANT: CPT | Performed by: INTERNAL MEDICINE

## 2024-07-20 PROCEDURE — 36600 WITHDRAWAL OF ARTERIAL BLOOD: CPT

## 2024-07-20 PROCEDURE — C9113 INJ PANTOPRAZOLE SODIUM, VIA: HCPCS | Performed by: INTERNAL MEDICINE

## 2024-07-20 PROCEDURE — 2500000004 HC RX 250 GENERAL PHARMACY W/ HCPCS (ALT 636 FOR OP/ED)

## 2024-07-20 PROCEDURE — 85025 COMPLETE CBC W/AUTO DIFF WBC: CPT

## 2024-07-20 PROCEDURE — 82947 ASSAY GLUCOSE BLOOD QUANT: CPT | Performed by: INTERNAL MEDICINE

## 2024-07-20 PROCEDURE — 2500000004 HC RX 250 GENERAL PHARMACY W/ HCPCS (ALT 636 FOR OP/ED): Performed by: STUDENT IN AN ORGANIZED HEALTH CARE EDUCATION/TRAINING PROGRAM

## 2024-07-20 PROCEDURE — 2500000005 HC RX 250 GENERAL PHARMACY W/O HCPCS: Performed by: STUDENT IN AN ORGANIZED HEALTH CARE EDUCATION/TRAINING PROGRAM

## 2024-07-20 PROCEDURE — 37799 UNLISTED PX VASCULAR SURGERY: CPT

## 2024-07-20 PROCEDURE — 99232 SBSQ HOSP IP/OBS MODERATE 35: CPT | Performed by: INTERNAL MEDICINE

## 2024-07-20 PROCEDURE — 70551 MRI BRAIN STEM W/O DYE: CPT | Performed by: RADIOLOGY

## 2024-07-20 PROCEDURE — 84484 ASSAY OF TROPONIN QUANT: CPT | Performed by: STUDENT IN AN ORGANIZED HEALTH CARE EDUCATION/TRAINING PROGRAM

## 2024-07-20 PROCEDURE — 37799 UNLISTED PX VASCULAR SURGERY: CPT | Performed by: INTERNAL MEDICINE

## 2024-07-20 PROCEDURE — 84100 ASSAY OF PHOSPHORUS: CPT

## 2024-07-20 PROCEDURE — 2500000004 HC RX 250 GENERAL PHARMACY W/ HCPCS (ALT 636 FOR OP/ED): Performed by: INTERNAL MEDICINE

## 2024-07-20 PROCEDURE — 2020000001 HC ICU ROOM DAILY

## 2024-07-20 PROCEDURE — 82947 ASSAY GLUCOSE BLOOD QUANT: CPT

## 2024-07-20 PROCEDURE — 80048 BASIC METABOLIC PNL TOTAL CA: CPT

## 2024-07-20 PROCEDURE — 2500000005 HC RX 250 GENERAL PHARMACY W/O HCPCS: Performed by: INTERNAL MEDICINE

## 2024-07-20 PROCEDURE — 84295 ASSAY OF SERUM SODIUM: CPT | Performed by: STUDENT IN AN ORGANIZED HEALTH CARE EDUCATION/TRAINING PROGRAM

## 2024-07-20 PROCEDURE — 80202 ASSAY OF VANCOMYCIN: CPT | Performed by: INTERNAL MEDICINE

## 2024-07-20 PROCEDURE — 99291 CRITICAL CARE FIRST HOUR: CPT | Performed by: STUDENT IN AN ORGANIZED HEALTH CARE EDUCATION/TRAINING PROGRAM

## 2024-07-20 PROCEDURE — 84132 ASSAY OF SERUM POTASSIUM: CPT | Performed by: STUDENT IN AN ORGANIZED HEALTH CARE EDUCATION/TRAINING PROGRAM

## 2024-07-20 RX ORDER — DEXTROSE 50 % IN WATER (D50W) INTRAVENOUS SYRINGE
25
Status: DISCONTINUED | OUTPATIENT
Start: 2024-07-20 | End: 2024-08-02 | Stop reason: HOSPADM

## 2024-07-20 RX ORDER — PANTOPRAZOLE SODIUM 40 MG/10ML
40 INJECTION, POWDER, LYOPHILIZED, FOR SOLUTION INTRAVENOUS DAILY
Status: DISCONTINUED | OUTPATIENT
Start: 2024-07-20 | End: 2024-07-22

## 2024-07-20 RX ORDER — HEPARIN SODIUM 5000 [USP'U]/ML
5000 INJECTION, SOLUTION INTRAVENOUS; SUBCUTANEOUS EVERY 8 HOURS
Status: DISCONTINUED | OUTPATIENT
Start: 2024-07-20 | End: 2024-07-20

## 2024-07-20 RX ORDER — INSULIN LISPRO 100 [IU]/ML
0-5 INJECTION, SOLUTION INTRAVENOUS; SUBCUTANEOUS EVERY 4 HOURS
Status: DISCONTINUED | OUTPATIENT
Start: 2024-07-20 | End: 2024-07-24

## 2024-07-20 RX ADMIN — PIPERACILLIN SODIUM AND TAZOBACTAM SODIUM 4.5 G: 4; .5 INJECTION, SOLUTION INTRAVENOUS at 11:47

## 2024-07-20 RX ADMIN — PIPERACILLIN SODIUM AND TAZOBACTAM SODIUM 4.5 G: 4; .5 INJECTION, SOLUTION INTRAVENOUS at 06:17

## 2024-07-20 RX ADMIN — PANTOPRAZOLE SODIUM 40 MG: 40 INJECTION, POWDER, FOR SOLUTION INTRAVENOUS at 09:42

## 2024-07-20 RX ADMIN — PROPOFOL 10 MCG/KG/MIN: 10 INJECTION, EMULSION INTRAVENOUS at 19:57

## 2024-07-20 RX ADMIN — PROPOFOL 10 MCG/KG/MIN: 10 INJECTION, EMULSION INTRAVENOUS at 03:57

## 2024-07-20 RX ADMIN — HEPARIN SODIUM 5000 UNITS: 5000 INJECTION, SOLUTION INTRAVENOUS; SUBCUTANEOUS at 00:30

## 2024-07-20 RX ADMIN — Medication 25 MCG/HR: at 16:21

## 2024-07-20 RX ADMIN — VANCOMYCIN 1250 MG: 1.75 INJECTION, SOLUTION INTRAVENOUS at 22:14

## 2024-07-20 RX ADMIN — HEPARIN SODIUM 5000 UNITS: 5000 INJECTION, SOLUTION INTRAVENOUS; SUBCUTANEOUS at 17:18

## 2024-07-20 RX ADMIN — PIPERACILLIN SODIUM AND TAZOBACTAM SODIUM 4.5 G: 4; .5 INJECTION, SOLUTION INTRAVENOUS at 17:18

## 2024-07-20 RX ADMIN — NOREPINEPHRINE BITARTRATE 0.05 MCG/KG/MIN: 8 INJECTION, SOLUTION INTRAVENOUS at 03:58

## 2024-07-20 RX ADMIN — Medication 80 PERCENT: at 08:00

## 2024-07-20 RX ADMIN — PIPERACILLIN SODIUM AND TAZOBACTAM SODIUM 4.5 G: 4; .5 INJECTION, SOLUTION INTRAVENOUS at 00:49

## 2024-07-20 ASSESSMENT — PAIN - FUNCTIONAL ASSESSMENT
PAIN_FUNCTIONAL_ASSESSMENT: CPOT (CRITICAL CARE PAIN OBSERVATION TOOL)

## 2024-07-20 ASSESSMENT — COGNITIVE AND FUNCTIONAL STATUS - GENERAL
MOVING TO AND FROM BED TO CHAIR: TOTAL
MOBILITY SCORE: 6
HELP NEEDED FOR BATHING: TOTAL
EATING MEALS: TOTAL
TURNING FROM BACK TO SIDE WHILE IN FLAT BAD: TOTAL
DAILY ACTIVITIY SCORE: 6
DRESSING REGULAR LOWER BODY CLOTHING: TOTAL
CLIMB 3 TO 5 STEPS WITH RAILING: TOTAL
MOVING FROM LYING ON BACK TO SITTING ON SIDE OF FLAT BED WITH BEDRAILS: TOTAL
STANDING UP FROM CHAIR USING ARMS: TOTAL
TOILETING: TOTAL
DRESSING REGULAR UPPER BODY CLOTHING: TOTAL
PERSONAL GROOMING: TOTAL
WALKING IN HOSPITAL ROOM: TOTAL

## 2024-07-20 NOTE — CARE PLAN
Problem: Knowledge Deficit  Goal: Patient/family/caregiver demonstrates understanding of disease process, treatment plan, medications, and discharge instructions  Outcome: Progressing     Problem: Mechanical Ventilation  Goal: Patient Will Maintain Patent Airway  Outcome: Progressing  Goal: Oral health is maintained or improved  Outcome: Progressing  Goal: ET tube will be managed safely  Outcome: Progressing  Goal: Ability to express needs and understand communication  Outcome: Progressing  Goal: Mobility/activity is maintained at optimum level for patient  Outcome: Progressing     Problem: Pain - Adult  Goal: Verbalizes/displays adequate comfort level or baseline comfort level  Outcome: Progressing     Problem: Safety - Adult  Goal: Free from fall injury  Outcome: Progressing     Problem: Discharge Planning  Goal: Discharge to home or other facility with appropriate resources  Outcome: Progressing     Problem: Chronic Conditions and Co-morbidities  Goal: Patient's chronic conditions and co-morbidity symptoms are monitored and maintained or improved  Outcome: Progressing     Problem: Skin  Goal: Decreased wound size/increased tissue granulation at next dressing change  Outcome: Progressing  Goal: Participates in plan/prevention/treatment measures  Outcome: Progressing  Goal: Prevent/manage excess moisture  Outcome: Progressing  Goal: Prevent/minimize sheer/friction injuries  Outcome: Progressing  Goal: Promote/optimize nutrition  Outcome: Progressing  Goal: Promote skin healing  Outcome: Progressing     Problem: Nutrition  Goal: Less than 5 days NPO/clear liquids  Outcome: Progressing  Goal: Oral intake greater than 50%  Outcome: Progressing  Goal: Oral intake greater 75%  Outcome: Progressing  Goal: Consume prescribed supplement  Outcome: Progressing  Goal: Adequate PO fluid intake  Outcome: Progressing  Goal: Nutrition support goals are met within 48 hrs  Outcome: Progressing  Goal: Nutrition support is meeting  75% of nutrient needs  Outcome: Progressing  Goal: Tube feed tolerance  Outcome: Progressing  Goal: BG  mg/dL  Outcome: Progressing  Goal: Lab values WNL  Outcome: Progressing  Goal: Electrolytes WNL  Outcome: Progressing  Goal: Promote healing  Outcome: Progressing  Goal: Maintain stable weight  Outcome: Progressing     Problem: Fall/Injury  Goal: Not fall by end of shift  Outcome: Progressing  Goal: Be free from injury by end of the shift  Outcome: Progressing  Goal: Verbalize understanding of personal risk factors for fall in the hospital  Outcome: Progressing  Goal: Verbalize understanding of risk factor reduction measures to prevent injury from fall in the home  Outcome: Progressing  Goal: Use assistive devices by end of the shift  Outcome: Progressing  Goal: Pace activities to prevent fatigue by end of the shift  Outcome: Progressing     Problem: Safety - Medical Restraint  Goal: Remains free of injury from restraints (Restraint for Interference with Medical Device)  Outcome: Progressing  Goal: Free from restraint(s) (Restraint for Interference with Medical Device)  Outcome: Progressing   The patient's goals for the shift include  comfort    The clinical goals for the shift include safety    Over the shift, the patient did not make progress toward the following goals. Barriers to progression include none. Recommendations to address these barriers include none.

## 2024-07-20 NOTE — CARE PLAN
Problem: Mechanical Ventilation  Goal: Patient Will Maintain Patent Airway  Outcome: Progressing  Goal: ET tube will be managed safely  Outcome: Progressing  Goal: Ability to express needs and understand communication  Outcome: Progressing  Goal: Mobility/activity is maintained at optimum level for patient  Outcome: Progressing

## 2024-07-20 NOTE — PROGRESS NOTES
"Subjective Data:  Patient currently intubated opens her eyes trying to wean her off the vent does not follow any commands.    Overnight Events:    Telemetry reviewed no events.     Objective Data:  Last Recorded Vitals:  Vitals:    07/20/24 0815 07/20/24 0830 07/20/24 0845 07/20/24 0900   BP: 116/76 117/79 125/89 (!) 128/100   Pulse: 53 56 73 78   Resp: 20 20 21 18   Temp:       TempSrc:       SpO2: 100% 100% 99% 98%   Weight:       Height:           Last Labs:  CBC - 7/20/2024:  5:33 AM  21.4 11.8 232    37.5      CMP - 7/20/2024:  5:33 AM  8.4 6.8 101 --- 0.5   3.3 3.2 91 122      PTT - 7/19/2024: 12:59 PM  1.0   11.0 25.5     HGBA1C   Date/Time Value Ref Range Status   06/29/2023 09:52 AM 5.7 4.3 - 5.6 % Final     Comment:     American Diabetes Association guidelines indicate that patients with HgbA1c in the range 5.7-6.4% are at increased risk for development of diabetes, and intervention by lifestyle modification may be beneficial. HgbA1c greater or equal to 6.5% is considered diagnostic of diabetes.   12/03/2021 02:00 PM 6.0 4.3 - 5.6 % Final     Comment:     American Diabetes Association guidelines indicate that patients with HgbA1c in   the range 5.7-6.4% are at increased risk for development of diabetes, and   intervention by lifestyle modification may be beneficial. HgbA1c greater or   equal to 6.5% is considered diagnostic of diabetes.      Last I/O:  I/O last 3 completed shifts:  In: 557.8 (5.6 mL/kg) [I.V.:277.8 (2.8 mL/kg); NG/GT:180; IV Piggyback:100]  Out: 540 (5.4 mL/kg) [Urine:540 (0.1 mL/kg/hr)]  Weight: 100.2 kg     Past Cardiology Tests (Last 3 Years):  EKG:  ECG 12 lead 07/19/2024 (Preliminary)    Echo:  No results found for this or any previous visit from the past 1095 days.    Ejection Fractions:  No results found for: \"EF\"  Cath:  No results found for this or any previous visit from the past 1095 days.    Stress Test:  No results found for this or any previous visit from the past 1095 " days.    Cardiac Imaging:  No results found for this or any previous visit from the past 1095 days.      Inpatient Medications:  Scheduled medications   Medication Dose Route Frequency    fentaNYL  25 mcg intravenous Once    heparin (porcine)  5,000 Units subcutaneous q8h    insulin lispro  0-5 Units subcutaneous q4h    oxygen   inhalation Continuous - Inhalation    pantoprazole  40 mg intravenous Daily    piperacillin-tazobactam  4.5 g intravenous q6h    vancomycin  1,250 mg intravenous q24h     PRN medications   Medication    dextrose    fentaNYL    glucagon    vancomycin     Continuous Medications   Medication Dose Last Rate    fentaNYL  0-300 mcg/hr Stopped (07/20/24 0838)    norepinephrine  0-0.2 mcg/kg/min Stopped (07/20/24 1040)    propofol  5-50 mcg/kg/min Stopped (07/20/24 0838)       Physical Exam:  General: Patient is in no acute distress.  Intubated sedated  HEENT: atraumatic normocephalic.  Neck: is supple jugular venous pressure within normal limits no thyromegaly.  Cardiovascular regular rate and rhythm normal heart sounds no murmurs rubs or gallops.  Lungs: clear to auscultation bilaterally.  Abdomen: is soft nontender.  Extremities warm to touch no edema.       Assessment/Plan   1 cardiac arrest patient was in ventricular fibrillation for which she underwent cardioversion and was intubated in the emergency room.  Patient did receive 1 round of epinephrine in the field.  Await for now neurologic recovery.  Limited 2D echo imaging quality but her ejection fraction appears preserved.  At this point I recommend to continue supportive care.  Will wait until her neurologic status evaluated.  Will check 1 more troponin today.  There is no plan for any ischemic workup for the time being until we reassess her neurologic status.  She does not show any hemodynamic instability or urgency to proceed with cardiac catheterization.      2.  Hypokalemia for which patient received IV potassium supplementation and  patient did have central line placed and she was initiated on IV potassium in the ICU  3.  History of hypertension for which she was on amlodipine    4.  History of gout for which she remains on allopurinol maintenance dose 300 mg daily    5.  History of dyslipidemia for which she remains on Lipitor 10 mg nightly  CVC 07/19/24 Triple lumen Right Internal jugular (Active)   Line Necessity No, provider contacted 07/20/24 0700   Site Assessment Clean;Dry;Intact 07/20/24 0700   Proximal Lumen Status Infusing;Capped;Blood return noted 07/20/24 0700   Medial 1 Lumen Status Blood return noted;Capped;Infusing 07/20/24 0700   Distal Lumen Status Blood return noted;Infusing;Capped 07/20/24 0700   Cap Change Cap changed 07/20/24 0700   Dressing Type Antimicrobial patch;Transparent 07/20/24 0700   Dressing Status Clean;Dry;Occlusive 07/20/24 0700   Number of days: 1       Peripheral IV 07/19/24 20 G Right Antecubital (Active)   Site Assessment Clean;Dry;Intact 07/20/24 0700   Dressing Type Transparent 07/20/24 0700   Line Status No blood return;Capped;Flushed;Saline locked 07/20/24 0700   Cap Change Cap changed 07/20/24 0700   Dressing Status Clean;Dry;Occlusive 07/20/24 0700   Number of days: 1       Peripheral IV 20 G Right;Anterior Wrist (Active)   Site Assessment Clean;Dry;Intact 07/20/24 0700   Dressing Type Transparent 07/20/24 0700   Line Status No blood return;Capped;Flushed;Saline locked 07/20/24 0700   Cap Change Cap changed 07/20/24 0700   Dressing Status Clean;Dry;Occlusive 07/20/24 0700   Number of days: 1       ETT  7.5 mm (Active)   Secured at (cm) 24 cm 07/20/24 0735   Measured from Lips 07/20/24 0735   Secured Location Center 07/20/24 0735   Secured by Commercial tube burns 07/20/24 0735   Site Condition Cool;Dry 07/20/24 0735   Number of days: 1       NG/OG/Feeding Tube OG - [place] sump 18 Fr Left mouth (Active)   Intake (mL) 0 mL 07/20/24 1040   Intake - Flush (mL) 0 mL 07/20/24 1040   Output (mL) 0 mL  07/20/24 1040   Number of days: 1       Urethral Catheter Non-latex 18 Fr. (Active)   Output (mL) 15 mL 07/20/24 1040   Number of days: 1       Code Status:  Full Code        Juan Byrd MD

## 2024-07-20 NOTE — CONSULTS
"Inpatient consult to Neurology  Consult performed by: Evon Allen MD  Consult ordered by: Jonathon Vásquez MD          History Of Present Illness  Nilam Ribeiro is a 73 y.o. female presented after an unwitnessed fall at home.  EMS was called and she underwent CPR.  Today she is intubated, but awake and vaguely following commands.       Past Medical History  She has no past medical history on file.    Surgical History  She has no past surgical history on file.     Social History  She reports that she has never smoked. She has never been exposed to tobacco smoke. She has never used smokeless tobacco. No history on file for alcohol use and drug use.     Allergies  Patient has no known allergies.    Medications  Medications Prior to Admission   Medication Sig Dispense Refill Last Dose    allopurinol (Zyloprim) 300 mg tablet Take 1 tablet (300 mg) by mouth once daily.       amLODIPine (Norvasc) 10 mg tablet Take 1 tablet (10 mg) by mouth once daily.       atorvastatin (Lipitor) 10 mg tablet Take 1 tablet (10 mg) by mouth once daily.        Review of Systems    Scheduled medications  fentaNYL, 25 mcg, intravenous, Once  heparin (porcine), 5,000 Units, subcutaneous, q8h  insulin lispro, 0-5 Units, subcutaneous, q4h  oxygen, , inhalation, Continuous - Inhalation  pantoprazole, 40 mg, intravenous, Daily  piperacillin-tazobactam, 4.5 g, intravenous, q6h  vancomycin, 1,250 mg, intravenous, q24h      Continuous medications  fentaNYL, 0-300 mcg/hr, Last Rate: Stopped (07/20/24 1635)  norepinephrine, 0-0.2 mcg/kg/min, Last Rate: Stopped (07/20/24 1040)  propofol, 5-50 mcg/kg/min, Last Rate: Stopped (07/20/24 1635)      PRN medications  PRN medications: dextrose, fentaNYL, glucagon, vancomycin    Last Recorded Vitals   Blood pressure 121/88, pulse 68, temperature 36.2 °C (97.2 °F), temperature source Axillary, resp. rate 17, height 1.702 m (5' 7\"), weight 98 kg (216 lb 0.8 oz), SpO2 97%.    Heart is RRR, Lungs " CTAB  Neurologically, pt is awake   Cognition is difficult to assess but she seems to have difficulty processing information No obvious aphasia  Cranial nerves: VFF, EOMI, No nystagmus, Face is symmetric to sensory and motor, unable to assess for dysarthria, Tongue protrudes midline, Shrug symmetric  Motor: weak in all extremities  Sensation is intact bilaterally throughout  Coordination: no ataxia  DTR unable to assess  Gait unable to assess    Relevant Results    Results for orders placed or performed during the hospital encounter of 07/19/24 (from the past 96 hour(s))   POCT GLUCOSE   Result Value Ref Range    POCT Glucose 188 (H) 74 - 99 mg/dL   CBC and Auto Differential   Result Value Ref Range    WBC 14.7 (H) 4.4 - 11.3 x10*3/uL    nRBC 0.0 0.0 - 0.0 /100 WBCs    RBC 4.34 4.00 - 5.20 x10*6/uL    Hemoglobin 12.0 12.0 - 16.0 g/dL    Hematocrit 39.6 36.0 - 46.0 %    MCV 91 80 - 100 fL    MCH 27.6 26.0 - 34.0 pg    MCHC 30.3 (L) 32.0 - 36.0 g/dL    RDW 15.9 (H) 11.5 - 14.5 %    Platelets 246 150 - 450 x10*3/uL    Neutrophils % 63.8 40.0 - 80.0 %    Immature Granulocytes %, Automated 4.5 (H) 0.0 - 0.9 %    Lymphocytes % 26.9 13.0 - 44.0 %    Monocytes % 3.5 2.0 - 10.0 %    Eosinophils % 0.9 0.0 - 6.0 %    Basophils % 0.4 0.0 - 2.0 %    Neutrophils Absolute 9.40 (H) 1.60 - 5.50 x10*3/uL    Immature Granulocytes Absolute, Automated 0.66 (H) 0.00 - 0.50 x10*3/uL    Lymphocytes Absolute 3.96 (H) 0.80 - 3.00 x10*3/uL    Monocytes Absolute 0.52 0.05 - 0.80 x10*3/uL    Eosinophils Absolute 0.13 0.00 - 0.40 x10*3/uL    Basophils Absolute 0.06 0.00 - 0.10 x10*3/uL   Comprehensive metabolic panel   Result Value Ref Range    Glucose 233 (H) 65 - 99 mg/dL    Sodium 141 133 - 145 mmol/L    Potassium 3.0 (L) 3.4 - 5.1 mmol/L    Chloride 105 97 - 107 mmol/L    Bicarbonate 19 (L) 24 - 31 mmol/L    Urea Nitrogen 12 8 - 25 mg/dL    Creatinine 1.10 0.40 - 1.60 mg/dL    eGFR 53 (L) >60 mL/min/1.73m*2    Calcium 8.5 8.5 - 10.4 mg/dL     Albumin 3.5 3.5 - 5.0 g/dL    Alkaline Phosphatase 122 35 - 125 U/L    Total Protein 6.8 5.9 - 7.9 g/dL     (H) 5 - 40 U/L    Bilirubin, Total 0.5 0.1 - 1.2 mg/dL    ALT 91 (H) 5 - 40 U/L    Anion Gap 17 <=19 mmol/L   Ethanol   Result Value Ref Range    Alcohol <0.010 0.000 - 0.010 g/dL   Magnesium   Result Value Ref Range    Magnesium 2.40 1.60 - 3.10 mg/dL   Bilirubin, Direct   Result Value Ref Range    Bilirubin, Direct <0.2 0.0 - 0.2 mg/dL   Serial Troponin, Initial ([facility])   Result Value Ref Range    Troponin T, High Sensitivity 18 (HH) <=14 ng/L   TSH with reflex to Free T4 if abnormal   Result Value Ref Range    Thyroid Stimulating Hormone 2.98 0.27 - 4.20 mIU/L   APTT   Result Value Ref Range    aPTT 25.5 22.0 - 32.5 seconds   Protime-INR   Result Value Ref Range    Protime 11.0 9.3 - 12.7 seconds    INR 1.0 0.9 - 1.2   Type and screen   Result Value Ref Range    ABO TYPE O     Rh TYPE POS     ANTIBODY SCREEN NEG    ECG 12 lead   Result Value Ref Range    Ventricular Rate 93 BPM    Atrial Rate 141 BPM    QRS Duration 122 ms    QT Interval 396 ms    QTC Calculation(Bazett) 492 ms    P Axis 63 degrees    R Axis -45 degrees    T Axis 124 degrees    QRS Count 15 beats    Q Onset 209 ms    T Offset 407 ms    QTC Fredericia 458 ms   Blood Gas Arterial Full Panel   Result Value Ref Range    POCT pH, Arterial 7.22 (LL) 7.38 - 7.42 pH    POCT pCO2, Arterial 46 (H) 38 - 42 mm Hg    POCT pO2, Arterial 68 (L) 85 - 95 mm Hg    POCT SO2, Arterial 92 (L) 94 - 100 %    POCT Oxy Hemoglobin, Arterial 90.9 (L) 94.0 - 98.0 %    POCT Hematocrit Calculated, Arterial 35.0 (L) 36.0 - 46.0 %    POCT Sodium, Arterial 139 136 - 145 mmol/L    POCT Potassium, Arterial 3.0 (L) 3.5 - 5.3 mmol/L    POCT Chloride, Arterial 106 98 - 107 mmol/L    POCT Ionized Calcium, Arterial 1.10 1.10 - 1.33 mmol/L    POCT Glucose, Arterial 231 (H) 74 - 99 mg/dL    POCT Lactate, Arterial 4.8 (HH) 0.4 - 2.0 mmol/L    POCT Base Excess, Arterial -8.7  (L) -2.0 - 3.0 mmol/L    POCT HCO3 Calculated, Arterial 18.8 (L) 22.0 - 26.0 mmol/L    POCT Hemoglobin, Arterial 11.6 (L) 12.0 - 16.0 g/dL    POCT Anion Gap, Arterial 17 10 - 25 mmo/L    Patient Temperature 37.0 degrees Celsius    FiO2 100 %    Ventilator Mode      Ventilator Rate 14 bpm    Tidal Volume 450 mL    Peep CHM2O 8.0 cm H2O    Site of Arterial Puncture Radial Right     Eric's Test Positive    Drug Screen, Urine   Result Value Ref Range    Amphetamine Screen, Urine Negative      Barbiturate Screen, Urine Negative      Benzodiazepines Screen, Urine Negative      Cannabinoid Screen, Urine Negative      Cocaine Metabolite Screen, Urine Negative      Fentanyl Screen, Urine Negative       Methadone Screen, Urine Negative      Opiate Screen, Urine Negative      Oxycodone Screen, Urine Negative      PCP Screen, Urine Negative     Blood Culture    Specimen: Peripheral Venipuncture; Blood culture   Result Value Ref Range    Blood Culture Loaded on Instrument - Culture in progress    Blood Culture    Specimen: Peripheral Venipuncture; Blood culture   Result Value Ref Range    Blood Culture Loaded on Instrument - Culture in progress    Blood Gas Arterial Full Panel   Result Value Ref Range    POCT pH, Arterial 7.24 (LL) 7.38 - 7.42 pH    POCT pCO2, Arterial 47 (H) 38 - 42 mm Hg    POCT pO2, Arterial 89 85 - 95 mm Hg    POCT SO2, Arterial 97 94 - 100 %    POCT Oxy Hemoglobin, Arterial 95.2 94.0 - 98.0 %    POCT Hematocrit Calculated, Arterial 37.0 36.0 - 46.0 %    POCT Sodium, Arterial 138 136 - 145 mmol/L    POCT Potassium, Arterial 3.4 (L) 3.5 - 5.3 mmol/L    POCT Chloride, Arterial 106 98 - 107 mmol/L    POCT Ionized Calcium, Arterial 1.15 1.10 - 1.33 mmol/L    POCT Glucose, Arterial 222 (H) 74 - 99 mg/dL    POCT Lactate, Arterial 2.9 (H) 0.4 - 2.0 mmol/L    POCT Base Excess, Arterial -7.2 (L) -2.0 - 3.0 mmol/L    POCT HCO3 Calculated, Arterial 20.1 (L) 22.0 - 26.0 mmol/L    POCT Hemoglobin, Arterial 12.3 12.0 -  16.0 g/dL    POCT Anion Gap, Arterial 15 10 - 25 mmo/L    Patient Temperature 37.0 degrees Celsius    FiO2 100 %    Ventilator Mode      Ventilator Rate 16 bpm    Tidal Volume 450 mL    Peep CHM2O 8.0 cm H2O    Site of Arterial Puncture Arterial Line     Eric's Test Positive    Transthoracic Echo (TTE) Complete   Result Value Ref Range    BSA 2.18 m2   Blood Gas Arterial Full Panel   Result Value Ref Range    POCT pH, Arterial 7.33 (L) 7.38 - 7.42 pH    POCT pCO2, Arterial 43 (H) 38 - 42 mm Hg    POCT pO2, Arterial 179 (H) 85 - 95 mm Hg    POCT SO2, Arterial 99 94 - 100 %    POCT Oxy Hemoglobin, Arterial 96.9 94.0 - 98.0 %    POCT Hematocrit Calculated, Arterial 38.0 36.0 - 46.0 %    POCT Sodium, Arterial 137 136 - 145 mmol/L    POCT Potassium, Arterial 3.3 (L) 3.5 - 5.3 mmol/L    POCT Chloride, Arterial 106 98 - 107 mmol/L    POCT Ionized Calcium, Arterial 1.15 1.10 - 1.33 mmol/L    POCT Glucose, Arterial 263 (H) 74 - 99 mg/dL    POCT Lactate, Arterial 2.1 (H) 0.4 - 2.0 mmol/L    POCT Base Excess, Arterial -3.2 (L) -2.0 - 3.0 mmol/L    POCT HCO3 Calculated, Arterial 22.7 22.0 - 26.0 mmol/L    POCT Hemoglobin, Arterial 12.6 12.0 - 16.0 g/dL    POCT Anion Gap, Arterial 12 10 - 25 mmo/L    Patient Temperature 37.0 degrees Celsius    FiO2 100 %    Ventilator Mode Other     Ventilator Rate 20 bpm    Tidal Volume 450 mL    Peep CHM2O 8.0 cm H2O    Site of Arterial Puncture Arterial Line     Eric's Test Negative    POCT GLUCOSE   Result Value Ref Range    POCT Glucose 128 (H) 74 - 99 mg/dL   CBC and Auto Differential   Result Value Ref Range    WBC 21.7 (H) 4.4 - 11.3 x10*3/uL    nRBC 0.0 0.0 - 0.0 /100 WBCs    RBC 4.30 4.00 - 5.20 x10*6/uL    Hemoglobin 11.7 (L) 12.0 - 16.0 g/dL    Hematocrit 38.5 36.0 - 46.0 %    MCV 90 80 - 100 fL    MCH 27.2 26.0 - 34.0 pg    MCHC 30.4 (L) 32.0 - 36.0 g/dL    RDW 15.9 (H) 11.5 - 14.5 %    Platelets 229 150 - 450 x10*3/uL    Neutrophils % 89.6 40.0 - 80.0 %    Immature Granulocytes  %, Automated 0.6 0.0 - 0.9 %    Lymphocytes % 5.4 13.0 - 44.0 %    Monocytes % 4.2 2.0 - 10.0 %    Eosinophils % 0.0 0.0 - 6.0 %    Basophils % 0.2 0.0 - 2.0 %    Neutrophils Absolute 19.45 (H) 1.60 - 5.50 x10*3/uL    Immature Granulocytes Absolute, Automated 0.13 0.00 - 0.50 x10*3/uL    Lymphocytes Absolute 1.18 0.80 - 3.00 x10*3/uL    Monocytes Absolute 0.92 (H) 0.05 - 0.80 x10*3/uL    Eosinophils Absolute 0.01 0.00 - 0.40 x10*3/uL    Basophils Absolute 0.04 0.00 - 0.10 x10*3/uL   Magnesium   Result Value Ref Range    Magnesium 2.50 1.60 - 3.10 mg/dL   Renal function panel   Result Value Ref Range    Glucose 157 (H) 65 - 99 mg/dL    Sodium 136 133 - 145 mmol/L    Potassium 3.9 3.4 - 5.1 mmol/L    Chloride 104 97 - 107 mmol/L    Bicarbonate 23 (L) 24 - 31 mmol/L    Urea Nitrogen 14 8 - 25 mg/dL    Creatinine 1.30 0.40 - 1.60 mg/dL    eGFR 44 (L) >60 mL/min/1.73m*2    Calcium 8.3 (L) 8.5 - 10.4 mg/dL    Phosphorus 2.2 (L) 2.5 - 4.5 mg/dL    Albumin 3.2 (L) 3.5 - 5.0 g/dL    Anion Gap 9 <=19 mmol/L   MRSA Surveillance for Vancomycin De-escalation, PCR    Specimen: Anterior Nares; Swab   Result Value Ref Range    MRSA PCR Not Detected Not Detected   POCT GLUCOSE   Result Value Ref Range    POCT Glucose 124 (H) 74 - 99 mg/dL   POCT GLUCOSE   Result Value Ref Range    POCT Glucose 142 (H) 74 - 99 mg/dL   Vancomycin   Result Value Ref Range    Vancomycin 15.7 10.0 - 20.0 ug/mL   Basic metabolic panel   Result Value Ref Range    Glucose 160 (H) 65 - 99 mg/dL    Sodium 139 133 - 145 mmol/L    Potassium 4.4 3.4 - 5.1 mmol/L    Chloride 107 97 - 107 mmol/L    Bicarbonate 23 (L) 24 - 31 mmol/L    Urea Nitrogen 16 8 - 25 mg/dL    Creatinine 1.50 0.40 - 1.60 mg/dL    eGFR 37 (L) >60 mL/min/1.73m*2    Calcium 8.4 (L) 8.5 - 10.4 mg/dL    Anion Gap 9 <=19 mmol/L   CBC and Auto Differential   Result Value Ref Range    WBC 21.4 (H) 4.4 - 11.3 x10*3/uL    nRBC 0.0 0.0 - 0.0 /100 WBCs    RBC 4.22 4.00 - 5.20 x10*6/uL    Hemoglobin  11.8 (L) 12.0 - 16.0 g/dL    Hematocrit 37.5 36.0 - 46.0 %    MCV 89 80 - 100 fL    MCH 28.0 26.0 - 34.0 pg    MCHC 31.5 (L) 32.0 - 36.0 g/dL    RDW 16.0 (H) 11.5 - 14.5 %    Platelets 232 150 - 450 x10*3/uL    Neutrophils % 90.1 40.0 - 80.0 %    Immature Granulocytes %, Automated 0.8 0.0 - 0.9 %    Lymphocytes % 4.9 13.0 - 44.0 %    Monocytes % 4.0 2.0 - 10.0 %    Eosinophils % 0.0 0.0 - 6.0 %    Basophils % 0.2 0.0 - 2.0 %    Neutrophils Absolute 19.28 (H) 1.60 - 5.50 x10*3/uL    Immature Granulocytes Absolute, Automated 0.18 0.00 - 0.50 x10*3/uL    Lymphocytes Absolute 1.05 0.80 - 3.00 x10*3/uL    Monocytes Absolute 0.85 (H) 0.05 - 0.80 x10*3/uL    Eosinophils Absolute 0.00 0.00 - 0.40 x10*3/uL    Basophils Absolute 0.05 0.00 - 0.10 x10*3/uL   Magnesium   Result Value Ref Range    Magnesium 2.40 1.60 - 3.10 mg/dL   Phosphorus   Result Value Ref Range    Phosphorus 3.3 2.5 - 4.5 mg/dL   Troponin T   Result Value Ref Range    Troponin T, High Sensitivity 78 (HH) <=14 ng/L   POCT GLUCOSE   Result Value Ref Range    POCT Glucose 136 (H) 74 - 99 mg/dL   Blood Gas Arterial Full Panel   Result Value Ref Range    POCT pH, Arterial 7.40 7.38 - 7.42 pH    POCT pCO2, Arterial 36 (L) 38 - 42 mm Hg    POCT pO2, Arterial 109 (H) 85 - 95 mm Hg    POCT SO2, Arterial 98 94 - 100 %    POCT Oxy Hemoglobin, Arterial 96.5 94.0 - 98.0 %    POCT Hematocrit Calculated, Arterial 36.0 36.0 - 46.0 %    POCT Sodium, Arterial 135 (L) 136 - 145 mmol/L    POCT Potassium, Arterial 4.2 3.5 - 5.3 mmol/L    POCT Chloride, Arterial 109 (H) 98 - 107 mmol/L    POCT Ionized Calcium, Arterial 1.17 1.10 - 1.33 mmol/L    POCT Glucose, Arterial 162 (H) 74 - 99 mg/dL    POCT Lactate, Arterial 1.2 0.4 - 2.0 mmol/L    POCT Base Excess, Arterial -2.1 (L) -2.0 - 3.0 mmol/L    POCT HCO3 Calculated, Arterial 22.3 22.0 - 26.0 mmol/L    POCT Hemoglobin, Arterial 12.0 12.0 - 16.0 g/dL    POCT Anion Gap, Arterial 8 (L) 10 - 25 mmo/L    Patient Temperature 37.0  degrees Celsius    FiO2 50 %    Apparatus      Ventilator Mode      Ventilator Rate 22 bpm    Tidal Volume 450 mL    Peep CHM2O 8.0 cm H2O    Site of Arterial Puncture Brachial Left     Eric's Test Positive    POCT GLUCOSE   Result Value Ref Range    POCT Glucose 119 (H) 74 - 99 mg/dL   Blood Gas Venous Full Panel   Result Value Ref Range    POCT pH, Venous 7.34 7.33 - 7.43 pH    POCT pCO2, Venous 43 41 - 51 mm Hg    POCT pO2, Venous 45 35 - 45 mm Hg    POCT SO2, Venous 73 45 - 75 %    POCT Oxy Hemoglobin, Venous 72.0 45.0 - 75.0 %    POCT Hematocrit Calculated, Venous 36.0 36.0 - 46.0 %    POCT Sodium, Venous 138 136 - 145 mmol/L    POCT Potassium, Venous 3.9 3.5 - 5.3 mmol/L    POCT Chloride, Venous 106 98 - 107 mmol/L    POCT Ionized Calicum, Venous 1.20 1.10 - 1.33 mmol/L    POCT Glucose, Venous 122 (H) 74 - 99 mg/dL    POCT Lactate, Venous 1.4 0.4 - 2.0 mmol/L    POCT Base Excess, Venous -2.6 (L) -2.0 - 3.0 mmol/L    POCT HCO3 Calculated, Venous 23.2 22.0 - 26.0 mmol/L    POCT Hemoglobin, Venous 12.0 12.0 - 16.0 g/dL    POCT Anion Gap, Venous 13.0 10.0 - 25.0 mmol/L    Patient Temperature 37.0 degrees Celsius    FiO2 40 %    Ventilator Mode CPAP     Ventilator Rate 23 bpm    Tidal Volume 445 mL    Spontaneous Tidal Volume 463 mL    Peep CHM2O 5.0 cm H2O    Pressure Support 5 cm H2O   POCT GLUCOSE   Result Value Ref Range    POCT Glucose 89 74 - 99 mg/dL        MR brain wo IV contrast    Result Date: 7/20/2024  Interpreted By:  Mariano Varela, STUDY: MR BRAIN WO IV CONTRAST;  7/20/2024 3:54 pm   INDICATION: Signs/Symptoms:Acute stroke protocol.   COMPARISON: None.   ACCESSION NUMBER(S): [id number]   ORDERING CLINICIAN: ANABELA CALLAWAY   TECHNIQUE: Standard multiplanar multisequence MR imaging was performed through the brain without intravenous contrast. Axial T2, FLAIR, DWI, gradient echo T2 and sagittal and coronal T1 weighted images of brain were acquired.  Motion artifact degrades assessment on  several sequences.   FINDINGS: Parenchyma: There is no diffusion restriction abnormality to suggest acute infarct.  No evidence of recent hemorrhage when allowing for significant motion degradation on GRE sequence. There is no mass effect or midline shift. There are patchy as well as confluent T2/FLAIR white matter hyperintensities within the bilateral cerebral hemispheres, favor chronic small vessel ischemic disease, with further assessment limited by motion artifact.   CSF Spaces: Nonspecific partial empty sella. The ventricles, sulci and basal cisterns are grossly within normal limits for age with aforementioned motion artifact limiting evaluation on multiple sequences. Basilar cisterns are patent.   Extra-axial spaces: No extra-axial fluid collection.   Paranasal Sinuses: Probable mucosal thickening, particularly involving the left maxillary paranasal sinus, not well assessed.   Mastoids: Grossly clear.   Orbits: Not well assessed.   Calvarium: No suspicious osseous marrow signal.       Examination is degraded by varying degrees of motion artifact. No evidence of acute infarct or intracranial mass effect. Probable mild-to-moderate chronic small vessel ischemic disease.   MACRO: None   Signed by: Mariano Varela 7/20/2024 4:23 PM Dictation workstation:   [id number]    CT head wo IV contrast    Result Date: 7/20/2024  Interpreted By:  Mariano Varela, STUDY: CT HEAD WO IV CONTRAST;  7/19/2024 11:00 pm   INDICATION: Signs/Symptoms:cerebral edema.   COMPARISON: CT head dated 07/19/2024 at 14:52   ACCESSION NUMBER(S): [id number]   ORDERING CLINICIAN: ZULEMA MICHAEL   TECHNIQUE: Noncontrast axial CT scan of head was performed.   FINDINGS: Parenchyma: There is no intracranial hemorrhage. The grey-white differentiation is intact. There is no mass effect or midline shift. Moderate chronic small vessel ischemic disease suggested with patchy as well as confluent white matter hypodensities within the bilateral cerebral  hemispheres. There is suspected small focus of encephalomalacia within the right frontal opercular region corresponding to previous abnormality (series 9, image 72 and series 3, image 44).   CSF Spaces: Nonspecific partial empty sella. The ventricles, sulci and basal cisterns are within normal limits for age with mild generalized brain atrophy. No CT apparent cerebral edema.   Extra-Axial Fluid: There is no extraaxial fluid collection.   Calvarium: The calvarium is unremarkable.   Paranasal sinuses: Extensive paranasal sinus disease with near-complete opacification of the left frontal sinus. There is scattered near-complete and partial opacification of numerous bilateral ethmoidal air cells. There is dependent fluid/secretions with partial opacification of the sphenoid sinuses bilaterally. There is complete opacification of the left maxillary paranasal sinus as well as dependent secretions and mucosal thickening within the right maxillary paranasal sinus. There is hyperostosis reflecting chronic sinusitis, most pronounced within the maxillary paranasal sinuses bilaterally. There are additional secretions opacifying the nasal cavity and visualized nasopharynx.   Mastoids: Clear.   Orbits: Normal.   Soft tissues: Unremarkable.       No acute intracranial hemorrhage, mass effect, or CT apparent acute infarct. Specifically, there is no CT evidence of cerebral edema with mild senescent change. Area of concern within the cortical/subcortical aspect of the right frontal operculum is favored to represent encephalomalacia from prior insult. This could be further characterized with MRI brain as clinically warranted.   Additional moderate chronic small vessel ischemic disease.   Severe chronic paranasal sinus disease as described in detail above.   MACRO: None   Signed by: Mariano Varela 7/20/2024 3:11 PM Dictation workstation:   [id number]    XR chest 1 view    Result Date: 7/19/2024  Interpreted By:  Katie Gilliam, STUDY: XR  CHEST 1 VIEW 7/19/2024 3:36 pm   INDICATION: Triple-lumen catheter placement   COMPARISON: 07/19/2024 done at 1:17 p.m.   ACCESSION NUMBER(S): [id number]   ORDERING CLINICIAN: ZULEMA MICHAEL   TECHNIQUE: Recumbent view of the chest at bedside   FINDINGS: A right jugular CVP line has been placed with tip terminating in the SVC. There is no pneumothorax.   Endotracheal tube remains satisfactorily positioned with tip 4.8 cm above kolby. Nasogastric tube descends below diaphragm.   The cardiac size is stable. Bilateral pulmonary infiltrates are once again observed and appear a little worse on the left but stable on the right.       Right jugular CVP line terminating in SVC without pneumothorax.   Bilateral pulmonary infiltrates stable on the right but worsening on the left.   Signed by: Katie Gilliam 7/19/2024 3:58 PM Dictation workstation:   [id number]    CT head wo IV contrast    Result Date: 7/19/2024  Interpreted By:  Cristal Sullivan, STUDY: CT HEAD WO IV CONTRAST;  7/19/2024 2:52 pm   INDICATION: Signs/Symptoms:AMS, OSH cardiac arrest.   COMPARISON: None.   ACCESSION NUMBER(S): [id number]   ORDERING CLINICIAN: FLETCHER ABREU   TECHNIQUE: Noncontrast axial CT scan of head was performed.   FINDINGS: Parenchyma: No intracranial hemorrhage. No midline shift. Small hypodensity in the right frontal lobe with blurring of gray-white junction (series 10, image 26). There is diffuse sulcal effacement along the right cerebral hemisphere relative to the left.   CSF Spaces: The ventricles, sulci and basal cisterns are within normal limits for age.   Extra-Axial Fluid: None.   Calvarium: No acute fracture.   Paranasal sinuses: Mucosal thickening of left frontal sinus. Near-complete opacification of ethmoid air cells and complete opacification of left maxillary sinus. Small air-fluid level in the right maxillary sinus.   Mastoids: Clear.   Orbits: No acute abnormality.   Soft tissues: No acute abnormality.       Small  right frontal lobe hypodensity with blurring of gray-white junction, concerning for possible acute infarct.   Diffuse sulcal effacement along the right cerebral hemisphere relative to the left suggesting cerebral edema. No midline shift, ventricular or sulcal effacement.   MACRO Cristal Sullivan discussed the significance and urgency of this critical finding by Epic secure chat with  FLETCHER ABREU on 7/19/2024 at 3:29 pm.  (**-RCF-**) Findings:  See findings.     Signed by: Cristal Sullivan 7/19/2024 3:30 PM Dictation workstation:   [id number]    ECG 12 lead    Result Date: 7/19/2024  Sinus tachycardia with 2nd degree AV block (Mobitz I) with occasional Premature ventricular complexes Left axis deviation Abnormal ECG No previous ECGs available    XR chest 1 view    Result Date: 7/19/2024  Interpreted By:  Dario Khalil, STUDY: XR CHEST 1 VIEW;  7/19/2024 1:27 pm   INDICATION: Signs/Symptoms:Post intubation.   COMPARISON: None.   ACCESSION NUMBER(S): [id number]   ORDERING CLINICIAN: FLETCHER ABREU   FINDINGS: ET tube 2.4 cm above the kolby. Feeding tube tip passes below the GE junction   CARDIOMEDIASTINAL SILHOUETTE: Cardiomediastinal silhouette is normal in size and configuration.   LUNGS: Extensive dense airspace disease at the lungs especially in the right upper lobe. A component of mild edema is present.   ABDOMEN: No remarkable upper abdominal findings.   BONES: No acute osseous changes.       1.  Extensive bilateral multifocal airspace disease worse at the right upper lung. Underlying pneumonia or atelectasis in the differential. 2. ET tube and feeding tube in place.       MACRO: None   Signed by: Dario Khalil 7/19/2024 1:41 PM Dictation workstation:   [id number]        Assessment/Plan   Principal Problem:    Cardiac arrest (Multi)    72 yo F appears to be awake soon after cardiac arrest.  I agree with plan to get MRI and will reassess exam tomorrow.  Will check nutritional labs to optimize  recovery.    I personally spent 60 minutes today, exclusive of procedures, providing care for this patient, including preparation, face to face time, documentation and other services such as review of medical records, diagnostic result, patient education, counseling, coordination of care as specified in the encounter.

## 2024-07-20 NOTE — CONSULTS
Vancomycin Dosing by Pharmacy- INITIAL    Nilam Ribeiro is a 73 y.o. year old female who Pharmacy has been consulted for vancomycin dosing for pneumonia. Based on the patient's indication and renal status this patient will be dosed based on a goal AUC of 400-600.     Renal function is currently declining.    Visit Vitals  /84   Pulse 91   Temp 36.8 °C (98.2 °F) (Axillary)   Resp 16        Lab Results   Component Value Date    CREATININE 1.30 2024    CREATININE 1.10 2024        Patient weight is as follows:   Vitals:    24 1515   Weight: 100 kg (221 lb 1.9 oz)       Cultures:  No results found for the encounter in last 14 days.        I/O last 3 completed shifts:  In: 437.5 (4.4 mL/kg) [I.V.:157.5 (1.6 mL/kg); NG/GT:180; IV Piggyback:100]  Out: 90 (0.9 mL/kg) [Urine:90 (0 mL/kg/hr)]  Weight: 100.3 kg   I/O during current shift:  I/O this shift:  In: 14.8 [I.V.:14.8]  Out: -     Temp (24hrs), Av.2 °C (97.2 °F), Min:35.5 °C (95.9 °F), Max:36.8 °C (98.2 °F)         Assessment/Plan     Patient will not be given a loading dose.  Will initiate vancomycin maintenance,  1250 mg every 24 hours.    This dosing regimen is predicted by [employer] to result in the following pharmacokinetic parameters:  Loading dose: N/A  Regimen: 1250 mg IV every 24 hours.  Start time: 21:08 on 2024  Exposure target: AUC24 (range)400-600 mg/L.hr   AUC24,ss: 457 mg/L.hr  Probability of AUC24 > 400: 65 %  Ctrough,ss: 14.3 mg/L  Probability of Ctrough,ss > 20: 19 %  Probability of nephrotoxicity (Lodise NICO ): 9 %    Follow-up level will be ordered on  at 0500 unless clinically indicated sooner.  Will continue to monitor renal function daily while on vancomycin and order serum creatinine at least every 48 hours if not already ordered.  Follow for continued vancomycin needs, clinical response, and signs/symptoms of toxicity.       Michelle Arevalo, PharmD

## 2024-07-20 NOTE — PROGRESS NOTES
Vancomycin Dosing by Pharmacy- FOLLOW UP    Nilam Ribeiro is a 73 y.o. year old female who Pharmacy has been consulted for vancomycin dosing for pneumonia. Based on the patient's indication and renal status this patient is being dosed based on a goal AUC of 400-600.     Renal function is currently declining.    Current vancomycin dose: 1250 mg given every 24 hours    Estimated vancomycin AUC on current dose: 574 mg/L.hr     Visit Vitals  /79   Pulse 52   Temp 36.3 °C (97.3 °F) (Axillary)   Resp 20        Lab Results   Component Value Date    CREATININE 1.50 2024    CREATININE 1.30 2024    CREATININE 1.10 2024        Patient weight is as follows:   Vitals:    24 0735   Weight: 98 kg (216 lb 0.8 oz)       Cultures:  No results found for the encounter in last 14 days.       I/O last 3 completed shifts:  In: 557.8 (5.6 mL/kg) [I.V.:277.8 (2.8 mL/kg); NG/GT:180; IV Piggyback:100]  Out: 540 (5.4 mL/kg) [Urine:540 (0.1 mL/kg/hr)]  Weight: 100.2 kg   I/O during current shift:  I/O this shift:  In: 614.2 [I.V.:164.2; IV Piggyback:450]  Out: 60 [Urine:60]    Temp (24hrs), Av.3 °C (97.3 °F), Min:35.5 °C (95.9 °F), Max:36.8 °C (98.2 °F)      Assessment/Plan    Within goal AUC range. Continue current vancomycin regimen.    This dosing regimen is predicted by [employer] to result in the following pharmacokinetic parameters:  Loading dose: N/A  Regimen: 1250 mg IV every 24 hours.  Start time: 11:24 on 2024  Exposure target: AUC24 (range)400-600 mg/L.hr   AUC24,ss: 574 mg/L.hr  Probability of AUC24 > 400: 97 %  Ctrough,ss: 16.6 mg/L  Probability of Ctrough,ss > 20: 26 %  Probability of nephrotoxicity (Lodise NICO ): 12 %    The next level will be obtained on  at 0500. May be obtained sooner if clinically indicated.   Will continue to monitor renal function daily while on vancomycin and order serum creatinine at least every 48 hours if not already ordered.  Follow for continued vancomycin  needs, clinical response, and signs/symptoms of toxicity.       Michelle Arevalo, PharmD

## 2024-07-20 NOTE — PROGRESS NOTES
Critical Care Medicine Progress Note    Admitted on:     7/19/2024  Length of Stay: 1 day(s)     Interval History     Patient was given a sedation break this am and  she was awake and moving all extremities.  Not following commands or any purposeful movements  SBT done with intermittent episodes of bradycardia  Pulling good volumes 350-550cc      Objective   Objective     Vitals:    07/20/24 0735   Weight: 98 kg (216 lb 0.8 oz)   Body mass index is 33.84 kg/m².        7/20/2024     8:45 AM 7/20/2024     9:00 AM 7/20/2024    10:00 AM 7/20/2024    11:00 AM 7/20/2024    12:00 PM 7/20/2024     3:00 PM 7/20/2024     4:30 PM   Vitals   Systolic 125 128 113 117 123 130 121   Diastolic 89 100 75 87 84 91 88   Heart Rate 73 78 61 62 76 78 68   Temp    36.2 °C (97.2 °F)      Resp 21 18 22 22 19 22 17        Vent settings:  Vent Mode: Pressure support  FiO2 (%):  [40 %-80 %] 40 %  S RR:  [20-22] 22  S VT:  [450 mL] 450 mL  PEEP/CPAP (cm H2O):  [5 cm H20-8 cm H20] 5 cm H20  IN SUP:  [8 cm H20] 8 cm H20  MAP (cm H2O):  [7.9-14] 7.9    Intake/Output Summary (Last 24 hours) at 7/20/2024 1704  Last data filed at 7/20/2024 1600  Gross per 24 hour   Intake 1052.81 ml   Output 575 ml   Net 477.81 ml       Intubated off sedation  PERRL, miotic pupils  Mechanical BS  S1S2 wnl  Abd soft  Ext no edema    Medications     Scheduled Medications:   fentaNYL, 25 mcg, intravenous, Once  heparin (porcine), 5,000 Units, subcutaneous, q8h  insulin lispro, 0-5 Units, subcutaneous, q4h  oxygen, , inhalation, Continuous - Inhalation  pantoprazole, 40 mg, intravenous, Daily  piperacillin-tazobactam, 4.5 g, intravenous, q6h  vancomycin, 1,250 mg, intravenous, q24h       Continuous Medications:   fentaNYL, 0-300 mcg/hr, Last Rate: Stopped (07/20/24 1635)  norepinephrine, 0-0.2 mcg/kg/min, Last Rate: Stopped (07/20/24 1040)  propofol, 5-50 mcg/kg/min, Last Rate: Stopped (07/20/24 1635)       PRN Medications:     Labs     Results from last 72 hours   Lab  "Units 07/20/24  0533 07/19/24 2014 07/19/24  1257   GLUCOSE mg/dL 160* 157* 233*   SODIUM mmol/L 139 136 141   POTASSIUM mmol/L 4.4 3.9 3.0*   CHLORIDE mmol/L 107 104 105   CO2 mmol/L 23* 23* 19*   BUN mg/dL 16 14 12   CREATININE mg/dL 1.50 1.30 1.10   EGFR mL/min/1.73m*2 37* 44* 53*   CALCIUM mg/dL 8.4* 8.3* 8.5   ALBUMIN g/dL  --  3.2* 3.5   MAGNESIUM mg/dL 2.40 2.50 2.40   PHOSPHORUS mg/dL 3.3 2.2*  --      Results from last 72 hours   Lab Units 07/19/24  1257   ALK PHOS U/L 122   ALT U/L 91*   AST U/L 101*   BILIRUBIN TOTAL mg/dL 0.5   PROTEIN TOTAL g/dL 6.8             Results from last 72 hours   Lab Units 07/20/24 0533 07/19/24 2014 07/19/24  1257   WBC AUTO x10*3/uL 21.4* 21.7* 14.7*   NRBC AUTO /100 WBCs 0.0 0.0 0.0   RBC AUTO x10*6/uL 4.22 4.30 4.34   HEMOGLOBIN g/dL 11.8* 11.7* 12.0   HEMATOCRIT % 37.5 38.5 39.6   MCV fL 89 90 91   MCH pg 28.0 27.2 27.6   MCHC g/dL 31.5* 30.4* 30.3*   RDW % 16.0* 15.9* 15.9*   PLATELETS AUTO x10*3/uL 232 229 246     Results from last 72 hours   Lab Units 07/20/24  1140 07/20/24  0910 07/19/24  1618 07/19/24  1415   POCT PH, ARTERIAL pH  --  7.40 7.33* 7.24*   POCT PCO2, ARTERIAL mm Hg  --  36* 43* 47*   POCT PO2, ARTERIAL mm Hg  --  109* 179* 89   POCT HCO3 CALCULATED, ARTERIAL mmol/L  --  22.3 22.7 20.1*   POCT LACTATE, ARTERIAL mmol/L  --  1.2 2.1* 2.9*   POCT BASE EXCESS, ARTERIAL mmol/L  --  -2.1* -3.2* -7.2*   FIO2 % 40 50 100 100     Lab Results   Component Value Date    BLOODCULT Loaded on Instrument - Culture in progress 07/19/2024     No results found for: \"URINECULTURE\"    Imaging and Diagnostic Studies     Lab/Radiology/Diagnostic Review:  Results for orders placed or performed during the hospital encounter of 07/19/24 (from the past 24 hour(s))   POCT GLUCOSE   Result Value Ref Range    POCT Glucose 128 (H) 74 - 99 mg/dL   CBC and Auto Differential   Result Value Ref Range    WBC 21.7 (H) 4.4 - 11.3 x10*3/uL    nRBC 0.0 0.0 - 0.0 /100 WBCs    RBC 4.30 4.00 " - 5.20 x10*6/uL    Hemoglobin 11.7 (L) 12.0 - 16.0 g/dL    Hematocrit 38.5 36.0 - 46.0 %    MCV 90 80 - 100 fL    MCH 27.2 26.0 - 34.0 pg    MCHC 30.4 (L) 32.0 - 36.0 g/dL    RDW 15.9 (H) 11.5 - 14.5 %    Platelets 229 150 - 450 x10*3/uL    Neutrophils % 89.6 40.0 - 80.0 %    Immature Granulocytes %, Automated 0.6 0.0 - 0.9 %    Lymphocytes % 5.4 13.0 - 44.0 %    Monocytes % 4.2 2.0 - 10.0 %    Eosinophils % 0.0 0.0 - 6.0 %    Basophils % 0.2 0.0 - 2.0 %    Neutrophils Absolute 19.45 (H) 1.60 - 5.50 x10*3/uL    Immature Granulocytes Absolute, Automated 0.13 0.00 - 0.50 x10*3/uL    Lymphocytes Absolute 1.18 0.80 - 3.00 x10*3/uL    Monocytes Absolute 0.92 (H) 0.05 - 0.80 x10*3/uL    Eosinophils Absolute 0.01 0.00 - 0.40 x10*3/uL    Basophils Absolute 0.04 0.00 - 0.10 x10*3/uL   Magnesium   Result Value Ref Range    Magnesium 2.50 1.60 - 3.10 mg/dL   Renal function panel   Result Value Ref Range    Glucose 157 (H) 65 - 99 mg/dL    Sodium 136 133 - 145 mmol/L    Potassium 3.9 3.4 - 5.1 mmol/L    Chloride 104 97 - 107 mmol/L    Bicarbonate 23 (L) 24 - 31 mmol/L    Urea Nitrogen 14 8 - 25 mg/dL    Creatinine 1.30 0.40 - 1.60 mg/dL    eGFR 44 (L) >60 mL/min/1.73m*2    Calcium 8.3 (L) 8.5 - 10.4 mg/dL    Phosphorus 2.2 (L) 2.5 - 4.5 mg/dL    Albumin 3.2 (L) 3.5 - 5.0 g/dL    Anion Gap 9 <=19 mmol/L   MRSA Surveillance for Vancomycin De-escalation, PCR    Specimen: Anterior Nares; Swab   Result Value Ref Range    MRSA PCR Not Detected Not Detected   POCT GLUCOSE   Result Value Ref Range    POCT Glucose 124 (H) 74 - 99 mg/dL   POCT GLUCOSE   Result Value Ref Range    POCT Glucose 142 (H) 74 - 99 mg/dL   Vancomycin   Result Value Ref Range    Vancomycin 15.7 10.0 - 20.0 ug/mL   Basic metabolic panel   Result Value Ref Range    Glucose 160 (H) 65 - 99 mg/dL    Sodium 139 133 - 145 mmol/L    Potassium 4.4 3.4 - 5.1 mmol/L    Chloride 107 97 - 107 mmol/L    Bicarbonate 23 (L) 24 - 31 mmol/L    Urea Nitrogen 16 8 - 25 mg/dL     Creatinine 1.50 0.40 - 1.60 mg/dL    eGFR 37 (L) >60 mL/min/1.73m*2    Calcium 8.4 (L) 8.5 - 10.4 mg/dL    Anion Gap 9 <=19 mmol/L   CBC and Auto Differential   Result Value Ref Range    WBC 21.4 (H) 4.4 - 11.3 x10*3/uL    nRBC 0.0 0.0 - 0.0 /100 WBCs    RBC 4.22 4.00 - 5.20 x10*6/uL    Hemoglobin 11.8 (L) 12.0 - 16.0 g/dL    Hematocrit 37.5 36.0 - 46.0 %    MCV 89 80 - 100 fL    MCH 28.0 26.0 - 34.0 pg    MCHC 31.5 (L) 32.0 - 36.0 g/dL    RDW 16.0 (H) 11.5 - 14.5 %    Platelets 232 150 - 450 x10*3/uL    Neutrophils % 90.1 40.0 - 80.0 %    Immature Granulocytes %, Automated 0.8 0.0 - 0.9 %    Lymphocytes % 4.9 13.0 - 44.0 %    Monocytes % 4.0 2.0 - 10.0 %    Eosinophils % 0.0 0.0 - 6.0 %    Basophils % 0.2 0.0 - 2.0 %    Neutrophils Absolute 19.28 (H) 1.60 - 5.50 x10*3/uL    Immature Granulocytes Absolute, Automated 0.18 0.00 - 0.50 x10*3/uL    Lymphocytes Absolute 1.05 0.80 - 3.00 x10*3/uL    Monocytes Absolute 0.85 (H) 0.05 - 0.80 x10*3/uL    Eosinophils Absolute 0.00 0.00 - 0.40 x10*3/uL    Basophils Absolute 0.05 0.00 - 0.10 x10*3/uL   Magnesium   Result Value Ref Range    Magnesium 2.40 1.60 - 3.10 mg/dL   Phosphorus   Result Value Ref Range    Phosphorus 3.3 2.5 - 4.5 mg/dL   Troponin T   Result Value Ref Range    Troponin T, High Sensitivity 78 (HH) <=14 ng/L   POCT GLUCOSE   Result Value Ref Range    POCT Glucose 136 (H) 74 - 99 mg/dL   Blood Gas Arterial Full Panel   Result Value Ref Range    POCT pH, Arterial 7.40 7.38 - 7.42 pH    POCT pCO2, Arterial 36 (L) 38 - 42 mm Hg    POCT pO2, Arterial 109 (H) 85 - 95 mm Hg    POCT SO2, Arterial 98 94 - 100 %    POCT Oxy Hemoglobin, Arterial 96.5 94.0 - 98.0 %    POCT Hematocrit Calculated, Arterial 36.0 36.0 - 46.0 %    POCT Sodium, Arterial 135 (L) 136 - 145 mmol/L    POCT Potassium, Arterial 4.2 3.5 - 5.3 mmol/L    POCT Chloride, Arterial 109 (H) 98 - 107 mmol/L    POCT Ionized Calcium, Arterial 1.17 1.10 - 1.33 mmol/L    POCT Glucose, Arterial 162 (H) 74 - 99  mg/dL    POCT Lactate, Arterial 1.2 0.4 - 2.0 mmol/L    POCT Base Excess, Arterial -2.1 (L) -2.0 - 3.0 mmol/L    POCT HCO3 Calculated, Arterial 22.3 22.0 - 26.0 mmol/L    POCT Hemoglobin, Arterial 12.0 12.0 - 16.0 g/dL    POCT Anion Gap, Arterial 8 (L) 10 - 25 mmo/L    Patient Temperature 37.0 degrees Celsius    FiO2 50 %    Apparatus      Ventilator Mode      Ventilator Rate 22 bpm    Tidal Volume 450 mL    Peep CHM2O 8.0 cm H2O    Site of Arterial Puncture Brachial Left     Eric's Test Positive    POCT GLUCOSE   Result Value Ref Range    POCT Glucose 119 (H) 74 - 99 mg/dL   Blood Gas Venous Full Panel   Result Value Ref Range    POCT pH, Venous 7.34 7.33 - 7.43 pH    POCT pCO2, Venous 43 41 - 51 mm Hg    POCT pO2, Venous 45 35 - 45 mm Hg    POCT SO2, Venous 73 45 - 75 %    POCT Oxy Hemoglobin, Venous 72.0 45.0 - 75.0 %    POCT Hematocrit Calculated, Venous 36.0 36.0 - 46.0 %    POCT Sodium, Venous 138 136 - 145 mmol/L    POCT Potassium, Venous 3.9 3.5 - 5.3 mmol/L    POCT Chloride, Venous 106 98 - 107 mmol/L    POCT Ionized Calicum, Venous 1.20 1.10 - 1.33 mmol/L    POCT Glucose, Venous 122 (H) 74 - 99 mg/dL    POCT Lactate, Venous 1.4 0.4 - 2.0 mmol/L    POCT Base Excess, Venous -2.6 (L) -2.0 - 3.0 mmol/L    POCT HCO3 Calculated, Venous 23.2 22.0 - 26.0 mmol/L    POCT Hemoglobin, Venous 12.0 12.0 - 16.0 g/dL    POCT Anion Gap, Venous 13.0 10.0 - 25.0 mmol/L    Patient Temperature 37.0 degrees Celsius    FiO2 40 %    Ventilator Mode CPAP     Ventilator Rate 23 bpm    Tidal Volume 445 mL    Spontaneous Tidal Volume 463 mL    Peep CHM2O 5.0 cm H2O    Pressure Support 5 cm H2O   POCT GLUCOSE   Result Value Ref Range    POCT Glucose 89 74 - 99 mg/dL     MR brain wo IV contrast    Result Date: 7/20/2024  Interpreted By:  Mariano Varela, STUDY: MR BRAIN WO IV CONTRAST;  7/20/2024 3:54 pm   INDICATION: Signs/Symptoms:Acute stroke protocol.   COMPARISON: None.   ACCESSION NUMBER(S): [id number]   ORDERING CLINICIAN:  ANABELA CALLAWAY   TECHNIQUE: Standard multiplanar multisequence MR imaging was performed through the brain without intravenous contrast. Axial T2, FLAIR, DWI, gradient echo T2 and sagittal and coronal T1 weighted images of brain were acquired.  Motion artifact degrades assessment on several sequences.   FINDINGS: Parenchyma: There is no diffusion restriction abnormality to suggest acute infarct.  No evidence of recent hemorrhage when allowing for significant motion degradation on GRE sequence. There is no mass effect or midline shift. There are patchy as well as confluent T2/FLAIR white matter hyperintensities within the bilateral cerebral hemispheres, favor chronic small vessel ischemic disease, with further assessment limited by motion artifact.   CSF Spaces: Nonspecific partial empty sella. The ventricles, sulci and basal cisterns are grossly within normal limits for age with aforementioned motion artifact limiting evaluation on multiple sequences. Basilar cisterns are patent.   Extra-axial spaces: No extra-axial fluid collection.   Paranasal Sinuses: Probable mucosal thickening, particularly involving the left maxillary paranasal sinus, not well assessed.   Mastoids: Grossly clear.   Orbits: Not well assessed.   Calvarium: No suspicious osseous marrow signal.       Examination is degraded by varying degrees of motion artifact. No evidence of acute infarct or intracranial mass effect. Probable mild-to-moderate chronic small vessel ischemic disease.   MACRO: None   Signed by: Mariano Varela 7/20/2024 4:23 PM Dictation workstation:   [id number]    CT head wo IV contrast    Result Date: 7/20/2024  Interpreted By:  Mariano Varela, STUDY: CT HEAD WO IV CONTRAST;  7/19/2024 11:00 pm   INDICATION: Signs/Symptoms:cerebral edema.   COMPARISON: CT head dated 07/19/2024 at 14:52   ACCESSION NUMBER(S): [id number]   ORDERING CLINICIAN: ZULEMA MICHAEL   TECHNIQUE: Noncontrast axial CT scan of head was performed.    FINDINGS: Parenchyma: There is no intracranial hemorrhage. The grey-white differentiation is intact. There is no mass effect or midline shift. Moderate chronic small vessel ischemic disease suggested with patchy as well as confluent white matter hypodensities within the bilateral cerebral hemispheres. There is suspected small focus of encephalomalacia within the right frontal opercular region corresponding to previous abnormality (series 9, image 72 and series 3, image 44).   CSF Spaces: Nonspecific partial empty sella. The ventricles, sulci and basal cisterns are within normal limits for age with mild generalized brain atrophy. No CT apparent cerebral edema.   Extra-Axial Fluid: There is no extraaxial fluid collection.   Calvarium: The calvarium is unremarkable.   Paranasal sinuses: Extensive paranasal sinus disease with near-complete opacification of the left frontal sinus. There is scattered near-complete and partial opacification of numerous bilateral ethmoidal air cells. There is dependent fluid/secretions with partial opacification of the sphenoid sinuses bilaterally. There is complete opacification of the left maxillary paranasal sinus as well as dependent secretions and mucosal thickening within the right maxillary paranasal sinus. There is hyperostosis reflecting chronic sinusitis, most pronounced within the maxillary paranasal sinuses bilaterally. There are additional secretions opacifying the nasal cavity and visualized nasopharynx.   Mastoids: Clear.   Orbits: Normal.   Soft tissues: Unremarkable.       No acute intracranial hemorrhage, mass effect, or CT apparent acute infarct. Specifically, there is no CT evidence of cerebral edema with mild senescent change. Area of concern within the cortical/subcortical aspect of the right frontal operculum is favored to represent encephalomalacia from prior insult. This could be further characterized with MRI brain as clinically warranted.   Additional moderate  chronic small vessel ischemic disease.   Severe chronic paranasal sinus disease as described in detail above.   MACRO: None   Signed by: Mariano Varela 7/20/2024 3:11 PM Dictation workstation:   [id number]    XR chest 1 view    Result Date: 7/19/2024  Interpreted By:  Katie Gilliam, STUDY: XR CHEST 1 VIEW 7/19/2024 3:36 pm   INDICATION: Triple-lumen catheter placement   COMPARISON: 07/19/2024 done at 1:17 p.m.   ACCESSION NUMBER(S): [id number]   ORDERING CLINICIAN: ZULEMA MICHAEL   TECHNIQUE: Recumbent view of the chest at bedside   FINDINGS: A right jugular CVP line has been placed with tip terminating in the SVC. There is no pneumothorax.   Endotracheal tube remains satisfactorily positioned with tip 4.8 cm above kolby. Nasogastric tube descends below diaphragm.   The cardiac size is stable. Bilateral pulmonary infiltrates are once again observed and appear a little worse on the left but stable on the right.       Right jugular CVP line terminating in SVC without pneumothorax.   Bilateral pulmonary infiltrates stable on the right but worsening on the left.   Signed by: Katie Gilliam 7/19/2024 3:58 PM Dictation workstation:   [id number]    CT head wo IV contrast    Result Date: 7/19/2024  Interpreted By:  Cristal Sullivan, STUDY: CT HEAD WO IV CONTRAST;  7/19/2024 2:52 pm   INDICATION: Signs/Symptoms:AMS, OSH cardiac arrest.   COMPARISON: None.   ACCESSION NUMBER(S): [id number]   ORDERING CLINICIAN: FLETCHER ABREU   TECHNIQUE: Noncontrast axial CT scan of head was performed.   FINDINGS: Parenchyma: No intracranial hemorrhage. No midline shift. Small hypodensity in the right frontal lobe with blurring of gray-white junction (series 10, image 26). There is diffuse sulcal effacement along the right cerebral hemisphere relative to the left.   CSF Spaces: The ventricles, sulci and basal cisterns are within normal limits for age.   Extra-Axial Fluid: None.   Calvarium: No acute fracture.   Paranasal sinuses:  Mucosal thickening of left frontal sinus. Near-complete opacification of ethmoid air cells and complete opacification of left maxillary sinus. Small air-fluid level in the right maxillary sinus.   Mastoids: Clear.   Orbits: No acute abnormality.   Soft tissues: No acute abnormality.       Small right frontal lobe hypodensity with blurring of gray-white junction, concerning for possible acute infarct.   Diffuse sulcal effacement along the right cerebral hemisphere relative to the left suggesting cerebral edema. No midline shift, ventricular or sulcal effacement.   MACRO Cristal Sullivan discussed the significance and urgency of this critical finding by Epic secure chat with  FLETCHER ABREU on 7/19/2024 at 3:29 pm.  (**-RCF-**) Findings:  See findings.     Signed by: Cristal Sullivan 7/19/2024 3:30 PM Dictation workstation:   [id number]    ECG 12 lead    Result Date: 7/19/2024  Sinus tachycardia with 2nd degree AV block (Mobitz I) with occasional Premature ventricular complexes Left axis deviation Abnormal ECG No previous ECGs available    XR chest 1 view    Result Date: 7/19/2024  Interpreted By:  Dario Khalil, STUDY: XR CHEST 1 VIEW;  7/19/2024 1:27 pm   INDICATION: Signs/Symptoms:Post intubation.   COMPARISON: None.   ACCESSION NUMBER(S): [id number]   ORDERING CLINICIAN: FLETCHER ABREU   FINDINGS: ET tube 2.4 cm above the kolby. Feeding tube tip passes below the GE junction   CARDIOMEDIASTINAL SILHOUETTE: Cardiomediastinal silhouette is normal in size and configuration.   LUNGS: Extensive dense airspace disease at the lungs especially in the right upper lobe. A component of mild edema is present.   ABDOMEN: No remarkable upper abdominal findings.   BONES: No acute osseous changes.       1.  Extensive bilateral multifocal airspace disease worse at the right upper lung. Underlying pneumonia or atelectasis in the differential. 2. ET tube and feeding tube in place.       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    MANAGEMENT PLAN:  - MRI brain ruled ou acute stroke  - Will extubate today and monitor  - Cardiology likely to consider ischemic evaluation as inpatient  - Empiric Abx Vancomycin and Zosyn for now  - Routine ICU care    I have personally interviewed and examined the patient.  I have personally verified elements of the exam listed above. Interval changes or irregularities are as noted.  I have personally and independently reviewed laboratory, radiographic and procedural data.  I have personally reviewed the problem list above and concur. Changes, if any, are noted.  I have personally reviewed the plan list above and concur. Changes, if any, are noted.    The patient has high probability of compromise including but not limited to organ system failure, mechanical respiratory and circulatory support, cardiac arrest and death. I have discussed this in detail with the family / caregivers.    I have personally spent 65 minutes of face to face critical care time (not including billable procedures billed separately) in taking care of the patient.  I have personally answered all questions to the satisfaction of the patient and / or family members to their satisfaction.        Tej Christie MD

## 2024-07-21 VITALS
RESPIRATION RATE: 20 BRPM | OXYGEN SATURATION: 96 % | DIASTOLIC BLOOD PRESSURE: 77 MMHG | TEMPERATURE: 97.5 F | SYSTOLIC BLOOD PRESSURE: 129 MMHG | WEIGHT: 217.59 LBS | HEART RATE: 65 BPM | HEIGHT: 67 IN | BODY MASS INDEX: 34.15 KG/M2

## 2024-07-21 LAB
ANION GAP BLDA CALCULATED.4IONS-SCNC: 7 MMO/L (ref 10–25)
ANION GAP SERPL CALC-SCNC: 10 MMOL/L
APPARATUS: ABNORMAL
APTT PPP: 30.8 SECONDS (ref 22–32.5)
APTT PPP: 73.8 SECONDS (ref 22–32.5)
ARTERIAL PATENCY WRIST A: POSITIVE
BACTERIA BLD CULT: NORMAL
BACTERIA BLD CULT: NORMAL
BASE EXCESS BLDA CALC-SCNC: -1.9 MMOL/L (ref -2–3)
BASOPHILS # BLD AUTO: 0.03 X10*3/UL (ref 0–0.1)
BASOPHILS NFR BLD AUTO: 0.2 %
BODY TEMPERATURE: 37 DEGREES CELSIUS
BUN SERPL-MCNC: 19 MG/DL (ref 8–25)
CA-I BLD-SCNC: 1.13 MMOL/L (ref 1.1–1.33)
CA-I BLDA-SCNC: 1.17 MMOL/L (ref 1.1–1.33)
CALCIUM SERPL-MCNC: 8.4 MG/DL (ref 8.5–10.4)
CHLORIDE BLDA-SCNC: 109 MMOL/L (ref 98–107)
CHLORIDE SERPL-SCNC: 105 MMOL/L (ref 97–107)
CO2 SERPL-SCNC: 23 MMOL/L (ref 24–31)
CREAT SERPL-MCNC: 1.8 MG/DL (ref 0.4–1.6)
EGFRCR SERPLBLD CKD-EPI 2021: 29 ML/MIN/1.73M*2
EOSINOPHIL # BLD AUTO: 0.01 X10*3/UL (ref 0–0.4)
EOSINOPHIL NFR BLD AUTO: 0.1 %
ERYTHROCYTE [DISTWIDTH] IN BLOOD BY AUTOMATED COUNT: 16.1 % (ref 11.5–14.5)
ERYTHROCYTE [DISTWIDTH] IN BLOOD BY AUTOMATED COUNT: 16.2 % (ref 11.5–14.5)
GLUCOSE BLD MANUAL STRIP-MCNC: 109 MG/DL (ref 74–99)
GLUCOSE BLD MANUAL STRIP-MCNC: 113 MG/DL (ref 74–99)
GLUCOSE BLD MANUAL STRIP-MCNC: 87 MG/DL (ref 74–99)
GLUCOSE BLD MANUAL STRIP-MCNC: 87 MG/DL (ref 74–99)
GLUCOSE BLD MANUAL STRIP-MCNC: 92 MG/DL (ref 74–99)
GLUCOSE BLDA-MCNC: 103 MG/DL (ref 74–99)
GLUCOSE SERPL-MCNC: 113 MG/DL (ref 65–99)
HCO3 BLDA-SCNC: 21.9 MMOL/L (ref 22–26)
HCT VFR BLD AUTO: 32.7 % (ref 36–46)
HCT VFR BLD AUTO: 33.1 % (ref 36–46)
HCT VFR BLD EST: 32 % (ref 36–46)
HGB BLD-MCNC: 10.2 G/DL (ref 12–16)
HGB BLD-MCNC: 10.3 G/DL (ref 12–16)
HGB BLDA-MCNC: 10.7 G/DL (ref 12–16)
IMM GRANULOCYTES # BLD AUTO: 0.08 X10*3/UL (ref 0–0.5)
IMM GRANULOCYTES NFR BLD AUTO: 0.4 % (ref 0–0.9)
INHALED O2 CONCENTRATION: 40 %
LACTATE BLDA-SCNC: 1 MMOL/L (ref 0.4–2)
LYMPHOCYTES # BLD AUTO: 1.15 X10*3/UL (ref 0.8–3)
LYMPHOCYTES NFR BLD AUTO: 6.4 %
MAGNESIUM SERPL-MCNC: 2.2 MG/DL (ref 1.6–3.1)
MCH RBC QN AUTO: 27.3 PG (ref 26–34)
MCH RBC QN AUTO: 27.4 PG (ref 26–34)
MCHC RBC AUTO-ENTMCNC: 31.1 G/DL (ref 32–36)
MCHC RBC AUTO-ENTMCNC: 31.2 G/DL (ref 32–36)
MCV RBC AUTO: 88 FL (ref 80–100)
MCV RBC AUTO: 88 FL (ref 80–100)
MONOCYTES # BLD AUTO: 0.61 X10*3/UL (ref 0.05–0.8)
MONOCYTES NFR BLD AUTO: 3.4 %
NEUTROPHILS # BLD AUTO: 16.1 X10*3/UL (ref 1.6–5.5)
NEUTROPHILS NFR BLD AUTO: 89.5 %
NRBC BLD-RTO: 0 /100 WBCS (ref 0–0)
NRBC BLD-RTO: 0 /100 WBCS (ref 0–0)
OXYHGB MFR BLDA: 95.9 % (ref 94–98)
PCO2 BLDA: 33 MM HG (ref 38–42)
PEEP CMH2O: 5 CM H2O
PH BLDA: 7.43 PH (ref 7.38–7.42)
PHOSPHATE SERPL-MCNC: 4.3 MG/DL (ref 2.5–4.5)
PLATELET # BLD AUTO: 163 X10*3/UL (ref 150–450)
PLATELET # BLD AUTO: 165 X10*3/UL (ref 150–450)
PO2 BLDA: 96 MM HG (ref 85–95)
POTASSIUM BLDA-SCNC: 3.8 MMOL/L (ref 3.5–5.3)
POTASSIUM SERPL-SCNC: 4.1 MMOL/L (ref 3.4–5.1)
RBC # BLD AUTO: 3.73 X10*6/UL (ref 4–5.2)
RBC # BLD AUTO: 3.76 X10*6/UL (ref 4–5.2)
SAO2 % BLDA: 98 % (ref 94–100)
SODIUM BLDA-SCNC: 134 MMOL/L (ref 136–145)
SODIUM SERPL-SCNC: 138 MMOL/L (ref 133–145)
SPECIMEN DRAWN FROM PATIENT: ABNORMAL
TIDAL VOLUME: 450 ML
VANCOMYCIN SERPL-MCNC: 21.8 UG/ML (ref 10–20)
VENTILATOR MODE: ABNORMAL
VENTILATOR RATE: 20 BPM
WBC # BLD AUTO: 17.4 X10*3/UL (ref 4.4–11.3)
WBC # BLD AUTO: 18 X10*3/UL (ref 4.4–11.3)

## 2024-07-21 PROCEDURE — 82330 ASSAY OF CALCIUM: CPT | Performed by: PHYSICIAN ASSISTANT

## 2024-07-21 PROCEDURE — 85730 THROMBOPLASTIN TIME PARTIAL: CPT | Performed by: INTERNAL MEDICINE

## 2024-07-21 PROCEDURE — 2500000001 HC RX 250 WO HCPCS SELF ADMINISTERED DRUGS (ALT 637 FOR MEDICARE OP): Performed by: NURSE PRACTITIONER

## 2024-07-21 PROCEDURE — 82435 ASSAY OF BLOOD CHLORIDE: CPT | Performed by: PHYSICIAN ASSISTANT

## 2024-07-21 PROCEDURE — 85025 COMPLETE CBC W/AUTO DIFF WBC: CPT

## 2024-07-21 PROCEDURE — 99291 CRITICAL CARE FIRST HOUR: CPT | Performed by: PHYSICIAN ASSISTANT

## 2024-07-21 PROCEDURE — 84100 ASSAY OF PHOSPHORUS: CPT

## 2024-07-21 PROCEDURE — 83735 ASSAY OF MAGNESIUM: CPT

## 2024-07-21 PROCEDURE — 85027 COMPLETE CBC AUTOMATED: CPT | Performed by: INTERNAL MEDICINE

## 2024-07-21 PROCEDURE — 82947 ASSAY GLUCOSE BLOOD QUANT: CPT

## 2024-07-21 PROCEDURE — 2500000005 HC RX 250 GENERAL PHARMACY W/O HCPCS: Performed by: INTERNAL MEDICINE

## 2024-07-21 PROCEDURE — 2500000002 HC RX 250 W HCPCS SELF ADMINISTERED DRUGS (ALT 637 FOR MEDICARE OP, ALT 636 FOR OP/ED)

## 2024-07-21 PROCEDURE — 2020000001 HC ICU ROOM DAILY

## 2024-07-21 PROCEDURE — 2500000004 HC RX 250 GENERAL PHARMACY W/ HCPCS (ALT 636 FOR OP/ED): Performed by: INTERNAL MEDICINE

## 2024-07-21 PROCEDURE — 2500000004 HC RX 250 GENERAL PHARMACY W/ HCPCS (ALT 636 FOR OP/ED)

## 2024-07-21 PROCEDURE — 84132 ASSAY OF SERUM POTASSIUM: CPT | Performed by: PHYSICIAN ASSISTANT

## 2024-07-21 PROCEDURE — 84132 ASSAY OF SERUM POTASSIUM: CPT

## 2024-07-21 PROCEDURE — 37799 UNLISTED PX VASCULAR SURGERY: CPT | Performed by: INTERNAL MEDICINE

## 2024-07-21 PROCEDURE — 36600 WITHDRAWAL OF ARTERIAL BLOOD: CPT

## 2024-07-21 PROCEDURE — 2500000004 HC RX 250 GENERAL PHARMACY W/ HCPCS (ALT 636 FOR OP/ED): Performed by: NURSE PRACTITIONER

## 2024-07-21 PROCEDURE — 99233 SBSQ HOSP IP/OBS HIGH 50: CPT | Performed by: INTERNAL MEDICINE

## 2024-07-21 PROCEDURE — 80202 ASSAY OF VANCOMYCIN: CPT | Performed by: INTERNAL MEDICINE

## 2024-07-21 PROCEDURE — 71045 X-RAY EXAM CHEST 1 VIEW: CPT | Performed by: RADIOLOGY

## 2024-07-21 PROCEDURE — 82435 ASSAY OF BLOOD CHLORIDE: CPT

## 2024-07-21 PROCEDURE — 2500000004 HC RX 250 GENERAL PHARMACY W/ HCPCS (ALT 636 FOR OP/ED): Performed by: STUDENT IN AN ORGANIZED HEALTH CARE EDUCATION/TRAINING PROGRAM

## 2024-07-21 PROCEDURE — C9113 INJ PANTOPRAZOLE SODIUM, VIA: HCPCS | Performed by: INTERNAL MEDICINE

## 2024-07-21 RX ORDER — MAGNESIUM SULFATE HEPTAHYDRATE 40 MG/ML
2 INJECTION, SOLUTION INTRAVENOUS ONCE
Status: DISCONTINUED | OUTPATIENT
Start: 2024-07-21 | End: 2024-07-21

## 2024-07-21 RX ORDER — NAPROXEN SODIUM 220 MG/1
81 TABLET, FILM COATED ORAL DAILY
Status: DISCONTINUED | OUTPATIENT
Start: 2024-07-21 | End: 2024-08-02 | Stop reason: HOSPADM

## 2024-07-21 RX ORDER — HEPARIN SODIUM 5000 [USP'U]/ML
2000-4000 INJECTION, SOLUTION INTRAVENOUS; SUBCUTANEOUS AS NEEDED
Status: DISCONTINUED | OUTPATIENT
Start: 2024-07-21 | End: 2024-07-24

## 2024-07-21 RX ORDER — OLANZAPINE 5 MG/1
5 TABLET, ORALLY DISINTEGRATING ORAL ONCE
Status: COMPLETED | OUTPATIENT
Start: 2024-07-21 | End: 2024-07-21

## 2024-07-21 RX ORDER — BISACODYL 10 MG/1
10 SUPPOSITORY RECTAL DAILY PRN
Status: DISCONTINUED | OUTPATIENT
Start: 2024-07-21 | End: 2024-08-02 | Stop reason: HOSPADM

## 2024-07-21 RX ORDER — QUETIAPINE FUMARATE 25 MG/1
12.5 TABLET, FILM COATED ORAL ONCE
Status: DISCONTINUED | OUTPATIENT
Start: 2024-07-21 | End: 2024-07-21

## 2024-07-21 RX ORDER — METOPROLOL TARTRATE 1 MG/ML
5 INJECTION, SOLUTION INTRAVENOUS EVERY 6 HOURS
Status: DISCONTINUED | OUTPATIENT
Start: 2024-07-21 | End: 2024-07-24

## 2024-07-21 RX ORDER — ATORVASTATIN CALCIUM 10 MG/1
10 TABLET, FILM COATED ORAL NIGHTLY
Status: DISCONTINUED | OUTPATIENT
Start: 2024-07-21 | End: 2024-08-02 | Stop reason: HOSPADM

## 2024-07-21 RX ORDER — HEPARIN SODIUM 10000 [USP'U]/100ML
0-4000 INJECTION, SOLUTION INTRAVENOUS CONTINUOUS
Status: DISCONTINUED | OUTPATIENT
Start: 2024-07-21 | End: 2024-07-24

## 2024-07-21 RX ORDER — HEPARIN SODIUM 5000 [USP'U]/ML
4000 INJECTION, SOLUTION INTRAVENOUS; SUBCUTANEOUS ONCE
Status: COMPLETED | OUTPATIENT
Start: 2024-07-21 | End: 2024-07-21

## 2024-07-21 RX ADMIN — PIPERACILLIN SODIUM AND TAZOBACTAM SODIUM 4.5 G: 4; .5 INJECTION, SOLUTION INTRAVENOUS at 22:03

## 2024-07-21 RX ADMIN — METOPROLOL TARTRATE 5 MG: 5 INJECTION INTRAVENOUS at 21:48

## 2024-07-21 RX ADMIN — PANTOPRAZOLE SODIUM 40 MG: 40 INJECTION, POWDER, FOR SOLUTION INTRAVENOUS at 09:44

## 2024-07-21 RX ADMIN — Medication 4 L/MIN: at 10:45

## 2024-07-21 RX ADMIN — PIPERACILLIN SODIUM AND TAZOBACTAM SODIUM 4.5 G: 4; .5 INJECTION, SOLUTION INTRAVENOUS at 11:30

## 2024-07-21 RX ADMIN — HEPARIN SODIUM 4000 UNITS: 5000 INJECTION, SOLUTION INTRAVENOUS; SUBCUTANEOUS at 16:11

## 2024-07-21 RX ADMIN — SODIUM CHLORIDE, SODIUM LACTATE, POTASSIUM CHLORIDE, AND CALCIUM CHLORIDE 500 ML: 600; 310; 30; 20 INJECTION, SOLUTION INTRAVENOUS at 09:44

## 2024-07-21 RX ADMIN — Medication 125 MCG/HR: at 05:06

## 2024-07-21 RX ADMIN — Medication 40 PERCENT: at 08:00

## 2024-07-21 RX ADMIN — HEPARIN SODIUM 5000 UNITS: 5000 INJECTION, SOLUTION INTRAVENOUS; SUBCUTANEOUS at 09:44

## 2024-07-21 RX ADMIN — HEPARIN SODIUM 5000 UNITS: 5000 INJECTION, SOLUTION INTRAVENOUS; SUBCUTANEOUS at 00:11

## 2024-07-21 RX ADMIN — OLANZAPINE 5 MG: 5 TABLET, ORALLY DISINTEGRATING ORAL at 23:15

## 2024-07-21 RX ADMIN — METOPROLOL TARTRATE 5 MG: 5 INJECTION INTRAVENOUS at 16:12

## 2024-07-21 RX ADMIN — PIPERACILLIN SODIUM AND TAZOBACTAM SODIUM 4.5 G: 4; .5 INJECTION, SOLUTION INTRAVENOUS at 00:11

## 2024-07-21 RX ADMIN — Medication 4 L/MIN: at 20:00

## 2024-07-21 RX ADMIN — PIPERACILLIN SODIUM AND TAZOBACTAM SODIUM 4.5 G: 4; .5 INJECTION, SOLUTION INTRAVENOUS at 16:18

## 2024-07-21 RX ADMIN — ASPIRIN 81 MG 81 MG: 81 TABLET ORAL at 09:43

## 2024-07-21 RX ADMIN — Medication 40 PERCENT: at 00:11

## 2024-07-21 RX ADMIN — HEPARIN SODIUM 1000 UNITS/HR: 10000 INJECTION, SOLUTION INTRAVENOUS at 16:12

## 2024-07-21 RX ADMIN — PIPERACILLIN SODIUM AND TAZOBACTAM SODIUM 4.5 G: 4; .5 INJECTION, SOLUTION INTRAVENOUS at 05:12

## 2024-07-21 SDOH — SOCIAL STABILITY: SOCIAL NETWORK: HOW OFTEN DO YOU ATTEND CHURCH OR RELIGIOUS SERVICES?: PATIENT DECLINED

## 2024-07-21 SDOH — ECONOMIC STABILITY: FOOD INSECURITY: HOW HARD IS IT FOR YOU TO PAY FOR THE VERY BASICS LIKE FOOD, HOUSING, MEDICAL CARE, AND HEATING?: PATIENT DECLINED

## 2024-07-21 SDOH — SOCIAL STABILITY: SOCIAL INSECURITY
WITHIN THE LAST YEAR, HAVE YOU BEEN RAPED OR FORCED TO HAVE ANY KIND OF SEXUAL ACTIVITY BY YOUR PARTNER OR EX-PARTNER?: NO

## 2024-07-21 SDOH — ECONOMIC STABILITY: INCOME INSECURITY
IN THE PAST 12 MONTHS, HAS THE ELECTRIC, GAS, OIL, OR WATER COMPANY THREATENED TO SHUT OFF SERVICE IN YOUR HOME?: PATIENT DECLINED

## 2024-07-21 SDOH — SOCIAL STABILITY: SOCIAL NETWORK
DO YOU BELONG TO ANY CLUBS OR ORGANIZATIONS SUCH AS CHURCH GROUPS, UNIONS, FRATERNAL OR ATHLETIC GROUPS, OR SCHOOL GROUPS?: PATIENT DECLINED

## 2024-07-21 SDOH — SOCIAL STABILITY: SOCIAL INSECURITY
WITHIN THE LAST YEAR, HAVE YOU BEEN KICKED, HIT, SLAPPED, OR OTHERWISE PHYSICALLY HURT BY YOUR PARTNER OR EX-PARTNER?: NO

## 2024-07-21 SDOH — ECONOMIC STABILITY: HOUSING INSECURITY: AT ANY TIME IN THE PAST 12 MONTHS, WERE YOU HOMELESS OR LIVING IN A SHELTER (INCLUDING NOW)?: PATIENT DECLINED

## 2024-07-21 SDOH — SOCIAL STABILITY: SOCIAL INSECURITY: WITHIN THE LAST YEAR, HAVE YOU BEEN AFRAID OF YOUR PARTNER OR EX-PARTNER?: NO

## 2024-07-21 SDOH — SOCIAL STABILITY: SOCIAL NETWORK: IN A TYPICAL WEEK, HOW MANY TIMES DO YOU TALK ON THE PHONE WITH FAMILY, FRIENDS, OR NEIGHBORS?: PATIENT DECLINED

## 2024-07-21 SDOH — ECONOMIC STABILITY: INCOME INSECURITY
IN THE PAST 12 MONTHS HAS THE ELECTRIC, GAS, OIL, OR WATER COMPANY THREATENED TO SHUT OFF SERVICES IN YOUR HOME?: PATIENT DECLINED

## 2024-07-21 SDOH — ECONOMIC STABILITY: TRANSPORTATION INSECURITY
IN THE PAST 12 MONTHS, HAS LACK OF TRANSPORTATION KEPT YOU FROM MEETINGS, WORK, OR FROM GETTING THINGS NEEDED FOR DAILY LIVING?: PATIENT DECLINED

## 2024-07-21 SDOH — SOCIAL STABILITY: SOCIAL NETWORK: HOW OFTEN DO YOU GET TOGETHER WITH FRIENDS OR RELATIVES?: PATIENT DECLINED

## 2024-07-21 SDOH — SOCIAL STABILITY: SOCIAL INSECURITY: ARE YOU MARRIED, WIDOWED, DIVORCED, SEPARATED, NEVER MARRIED, OR LIVING WITH A PARTNER?: MARRIED

## 2024-07-21 SDOH — SOCIAL STABILITY: SOCIAL NETWORK
DO YOU BELONG TO ANY CLUBS OR ORGANIZATIONS SUCH AS CHURCH GROUPS UNIONS, FRATERNAL OR ATHLETIC GROUPS, OR SCHOOL GROUPS?: PATIENT DECLINED

## 2024-07-21 SDOH — SOCIAL STABILITY: SOCIAL NETWORK: ARE YOU MARRIED, WIDOWED, DIVORCED, SEPARATED, NEVER MARRIED, OR LIVING WITH A PARTNER?: MARRIED

## 2024-07-21 SDOH — SOCIAL STABILITY: SOCIAL INSECURITY: WITHIN THE LAST YEAR, HAVE YOU BEEN HUMILIATED OR EMOTIONALLY ABUSED IN OTHER WAYS BY YOUR PARTNER OR EX-PARTNER?: NO

## 2024-07-21 SDOH — ECONOMIC STABILITY: TRANSPORTATION INSECURITY
IN THE PAST 12 MONTHS, HAS THE LACK OF TRANSPORTATION KEPT YOU FROM MEDICAL APPOINTMENTS OR FROM GETTING MEDICATIONS?: PATIENT DECLINED

## 2024-07-21 SDOH — HEALTH STABILITY: MENTAL HEALTH
HOW OFTEN DO YOU NEED TO HAVE SOMEONE HELP YOU WHEN YOU READ INSTRUCTIONS, PAMPHLETS, OR OTHER WRITTEN MATERIAL FROM YOUR DOCTOR OR PHARMACY?: PATIENT DECLINES TO RESPOND

## 2024-07-21 SDOH — SOCIAL STABILITY: SOCIAL INSECURITY
WITHIN THE LAST YEAR, HAVE TO BEEN RAPED OR FORCED TO HAVE ANY KIND OF SEXUAL ACTIVITY BY YOUR PARTNER OR EX-PARTNER?: NO

## 2024-07-21 SDOH — SOCIAL STABILITY: SOCIAL NETWORK: HOW OFTEN DO YOU ATTEND MEETINGS OF THE CLUBS OR ORGANIZATIONS YOU BELONG TO?: PATIENT DECLINED

## 2024-07-21 SDOH — ECONOMIC STABILITY: FOOD INSECURITY
WITHIN THE PAST 12 MONTHS, YOU WORRIED THAT YOUR FOOD WOULD RUN OUT BEFORE YOU GOT THE MONEY TO BUY MORE.: PATIENT DECLINED

## 2024-07-21 SDOH — ECONOMIC STABILITY: HOUSING INSECURITY: IN THE LAST 12 MONTHS, WAS THERE A TIME WHEN YOU WERE NOT ABLE TO PAY THE MORTGAGE OR RENT ON TIME?: PATIENT DECLINED

## 2024-07-21 SDOH — SOCIAL STABILITY: SOCIAL NETWORK: HOW OFTEN DO YOU ATTENT MEETINGS OF THE CLUB OR ORGANIZATION YOU BELONG TO?: PATIENT DECLINED

## 2024-07-21 SDOH — ECONOMIC STABILITY: FOOD INSECURITY: WITHIN THE PAST 12 MONTHS, THE FOOD YOU BOUGHT JUST DIDN'T LAST AND YOU DIDN'T HAVE MONEY TO GET MORE.: PATIENT DECLINED

## 2024-07-21 SDOH — HEALTH STABILITY: MENTAL HEALTH
DO YOU FEEL STRESS - TENSE, RESTLESS, NERVOUS, OR ANXIOUS, OR UNABLE TO SLEEP AT NIGHT BECAUSE YOUR MIND IS TROUBLED ALL THE TIME - THESE DAYS?: PATIENT DECLINED

## 2024-07-21 SDOH — ECONOMIC STABILITY: FOOD INSECURITY: WITHIN THE PAST 12 MONTHS, YOU WORRIED THAT YOUR FOOD WOULD RUN OUT BEFORE YOU GOT MONEY TO BUY MORE.: PATIENT DECLINED

## 2024-07-21 SDOH — ECONOMIC STABILITY: INCOME INSECURITY: IN THE LAST 12 MONTHS, WAS THERE A TIME WHEN YOU WERE NOT ABLE TO PAY THE MORTGAGE OR RENT ON TIME?: PATIENT DECLINED

## 2024-07-21 SDOH — ECONOMIC STABILITY: TRANSPORTATION INSECURITY
IN THE PAST 12 MONTHS, HAS LACK OF TRANSPORTATION KEPT YOU FROM MEDICAL APPOINTMENTS OR FROM GETTING MEDICATIONS?: PATIENT DECLINED

## 2024-07-21 SDOH — HEALTH STABILITY: MENTAL HEALTH
STRESS IS WHEN SOMEONE FEELS TENSE, NERVOUS, ANXIOUS, OR CAN'T SLEEP AT NIGHT BECAUSE THEIR MIND IS TROUBLED. HOW STRESSED ARE YOU?: PATIENT DECLINED

## 2024-07-21 SDOH — ECONOMIC STABILITY: INCOME INSECURITY: HOW HARD IS IT FOR YOU TO PAY FOR THE VERY BASICS LIKE FOOD, HOUSING, MEDICAL CARE, AND HEATING?: PATIENT DECLINED

## 2024-07-21 ASSESSMENT — COGNITIVE AND FUNCTIONAL STATUS - GENERAL
TURNING FROM BACK TO SIDE WHILE IN FLAT BAD: A LITTLE
CLIMB 3 TO 5 STEPS WITH RAILING: A LOT
DRESSING REGULAR UPPER BODY CLOTHING: A LOT
EATING MEALS: A LOT
PERSONAL GROOMING: A LOT
MOVING FROM LYING ON BACK TO SITTING ON SIDE OF FLAT BED WITH BEDRAILS: A LITTLE
TOILETING: A LOT
STANDING UP FROM CHAIR USING ARMS: A LOT
MOVING TO AND FROM BED TO CHAIR: A LOT
WALKING IN HOSPITAL ROOM: A LOT
DRESSING REGULAR LOWER BODY CLOTHING: A LOT
HELP NEEDED FOR BATHING: A LOT

## 2024-07-21 ASSESSMENT — ACTIVITIES OF DAILY LIVING (ADL)
LACK_OF_TRANSPORTATION: NO
LACK_OF_TRANSPORTATION: PATIENT DECLINED

## 2024-07-21 ASSESSMENT — PAIN - FUNCTIONAL ASSESSMENT
PAIN_FUNCTIONAL_ASSESSMENT: CPOT (CRITICAL CARE PAIN OBSERVATION TOOL)
PAIN_FUNCTIONAL_ASSESSMENT: 0-10
PAIN_FUNCTIONAL_ASSESSMENT: 0-10

## 2024-07-21 ASSESSMENT — PAIN SCALES - GENERAL
PAINLEVEL_OUTOF10: 0 - NO PAIN
PAINLEVEL_OUTOF10: 0 - NO PAIN

## 2024-07-21 NOTE — PROGRESS NOTES
"Subjective Data:  Patient extubated.  Lying comfortable in bed.  Unable to talk due to hoarse voice from intubation.  Denies having chest pain or shortness of breath.  Telemetry showing frequent PVCs.    Overnight Events:    Events overnight reviewed     Objective Data:  Last Recorded Vitals:  Vitals:    07/21/24 1027 07/21/24 1100 07/21/24 1200 07/21/24 1300   BP:  118/65 129/87 126/90   Pulse:  77 72 78   Resp:  18 26 17   Temp:  36.4 °C (97.5 °F)     TempSrc:  Temporal     SpO2: 95% 94% 93% 95%   Weight:    98.7 kg (217 lb 9.5 oz)   Height:           Last Labs:  CBC - 7/21/2024:  5:35 AM  18.0 10.2 163    32.7      CMP - 7/21/2024:  5:35 AM  8.4 6.8 101 --- 0.5   4.3 3.2 91 122      PTT - 7/19/2024: 12:59 PM  1.0   11.0 25.5     HGBA1C   Date/Time Value Ref Range Status   06/29/2023 09:52 AM 5.7 4.3 - 5.6 % Final     Comment:     American Diabetes Association guidelines indicate that patients with HgbA1c in the range 5.7-6.4% are at increased risk for development of diabetes, and intervention by lifestyle modification may be beneficial. HgbA1c greater or equal to 6.5% is considered diagnostic of diabetes.   12/03/2021 02:00 PM 6.0 4.3 - 5.6 % Final     Comment:     American Diabetes Association guidelines indicate that patients with HgbA1c in   the range 5.7-6.4% are at increased risk for development of diabetes, and   intervention by lifestyle modification may be beneficial. HgbA1c greater or   equal to 6.5% is considered diagnostic of diabetes.      Last I/O:  I/O last 3 completed shifts:  In: 1055.2 (10.7 mL/kg) [I.V.:405.2 (4.1 mL/kg); IV Piggyback:650]  Out: 1300 (13.2 mL/kg) [Urine:1300 (0.4 mL/kg/hr)]  Weight: 98.7 kg     Past Cardiology Tests (Last 3 Years):  EKG:  ECG 12 lead 07/19/2024 (Preliminary)    Echo:  No results found for this or any previous visit from the past 1095 days.    Ejection Fractions:  No results found for: \"EF\"  Cath:  No results found for this or any previous visit from the past " 1095 days.    Stress Test:  No results found for this or any previous visit from the past 1095 days.    Cardiac Imaging:  No results found for this or any previous visit from the past 1095 days.      Inpatient Medications:  Scheduled medications   Medication Dose Route Frequency    aspirin  81 mg oral Daily    atorvastatin  10 mg oral Nightly    fentaNYL  25 mcg intravenous Once    heparin (porcine)  5,000 Units subcutaneous q8h    insulin lispro  0-5 Units subcutaneous q4h    oxygen   inhalation Continuous - Inhalation    oxygen   inhalation Continuous - Inhalation    pantoprazole  40 mg intravenous Daily    piperacillin-tazobactam  4.5 g intravenous q6h     PRN medications   Medication    bisacodyl    dextrose    fentaNYL    glucagon     Continuous Medications   Medication Dose Last Rate    fentaNYL  0-300 mcg/hr Stopped (07/21/24 0530)    norepinephrine  0-0.2 mcg/kg/min Stopped (07/20/24 1040)    propofol  5-50 mcg/kg/min Stopped (07/21/24 0530)       Physical Exam:  General: Patient is in no acute distress.  Intubated sedated  HEENT: atraumatic normocephalic.  Neck: is supple jugular venous pressure within normal limits no thyromegaly.  Cardiovascular regular rate and rhythm normal heart sounds no murmurs rubs or gallops.  Lungs: clear to auscultation bilaterally.  Abdomen: is soft nontender.  Extremities warm to touch no edema.           Assessment/Plan  1 cardiac arrest patient was in ventricular fibrillation for which she underwent cardioversion and was intubated in the emergency room.  Patient did receive 1 round of epinephrine in the field.  Await for now neurologic recovery.  Limited 2D echo imaging quality but her ejection fraction appears preserved.  Patient currently extubated.  Mental status stable.  No apparent focal neurologic deficit.  Troponin only went high to 80.  For now will start on small dose beta-blocker for frequent PVCs.  Will consult EP for input.      2.  Hypokalemia for which patient  received IV potassium supplementation and patient did have central line placed and she was initiated on IV potassium in the ICU    3.  History of hypertension for which she was on amlodipine     4.  History of gout for which she remains on allopurinol maintenance dose 300 mg daily     5.  History of dyslipidemia for which she remains on Lipitor 10 mg nightly  CVC 07/19/24 Triple lumen Right Internal jugular (Active)   Line Necessity Intravenous medication therapy 07/21/24 0700   Site Assessment Clean;Dry;Intact 07/21/24 0700   Proximal Lumen Status Blood return noted;Capped;Flushed;Infusing 07/21/24 0700   Medial 1 Lumen Status Blood return noted;Capped;Flushed;Normal saline locked 07/21/24 0700   Distal Lumen Status Blood return noted;Capped;Flushed;Normal saline locked 07/21/24 0700   Cap Change Cap changed 07/21/24 0700   Tubing Change Tubing changed 07/21/24 0700   Dressing Type Antimicrobial patch;Transparent 07/21/24 0700   Dressing Status Clean;Dry;Occlusive 07/21/24 0700   Number of days: 2       Peripheral IV 07/19/24 20 G Right Antecubital (Active)   Site Assessment Clean;Dry;Intact 07/21/24 0700   Dressing Type Transparent 07/21/24 0700   Line Status No blood return;Capped;Flushed;Saline locked 07/21/24 0700   Cap Change Cap changed 07/21/24 0700   Dressing Status Clean;Dry;Occlusive 07/21/24 0700   Number of days: 2       Urethral Catheter Non-latex 18 Fr. (Active)   Reason for Continuing Urinary Catheterization accurate hourly measurement of urine volume in a critically ill patient that cannot be assessed by other volumes and urine collection strategies 07/21/24 0800   Number of days: 2       Code Status:  Full Code      Juan Byrd MD

## 2024-07-21 NOTE — PROGRESS NOTES
Vancomycin Dosing by Pharmacy- Cessation of Therapy    Consult to pharmacy for vancomycin dosing has been discontinued by the prescriber, pharmacy will sign off at this time.    Please call pharmacy if there are further questions or re-enter a consult if vancomycin is resumed.     Michelle Arevalo, JoeD

## 2024-07-21 NOTE — CONSULTS
"Nutrition Assessement Note    Nutrition Assessment    Reason for Assessment: Tube feeding recommendations    Reason for Hospital Admission:  Nilam Ribeiro is a 73 y.o. female who is admitted for cardiac arrest. Spoke with RN. Patient likely to be extubated today, no plan for tube feed at this time. Will advance diet per SLP recommendations post extubation.    History reviewed. No pertinent past medical history.   History reviewed. No pertinent surgical history.    Nutrition History:  Food and Nutrient History: NA     Food Allergies/Intolerances:  None  GI Symptoms: None  Oral Problems: None    Anthropometrics:  Ht: 170.2 cm (5' 7\"), Wt: 98.7 kg (217 lb 9.5 oz), BMI: 34.07  IBW/kg (Dietitian Calculated): 61 kg  Percent of IBW: 160 %     Weight Change:  Daily Weight  07/21/24 : 98.7 kg (217 lb 9.5 oz)     Nutrition Focused Physical Exam Findings:      Nutrition Significant Labs:  Lab Results   Component Value Date    WBC 18.0 (H) 07/21/2024    HGB 10.2 (L) 07/21/2024    HCT 32.7 (L) 07/21/2024     07/21/2024    ALT 91 (H) 07/19/2024     (H) 07/19/2024     07/21/2024    K 4.1 07/21/2024     07/21/2024    CREATININE 1.80 (H) 07/21/2024    BUN 19 07/21/2024    CO2 23 (L) 07/21/2024    TSH 2.98 07/19/2024    INR 1.0 07/19/2024    HGBA1C 5.7 (H) 06/29/2023     Nutrition Specific Medications:  aspirin, 81 mg, oral, Daily  atorvastatin, 10 mg, oral, Nightly  fentaNYL, 25 mcg, intravenous, Once  heparin (porcine), 5,000 Units, subcutaneous, q8h  insulin lispro, 0-5 Units, subcutaneous, q4h  oxygen, , inhalation, Continuous - Inhalation  oxygen, , inhalation, Continuous - Inhalation  pantoprazole, 40 mg, intravenous, Daily  piperacillin-tazobactam, 4.5 g, intravenous, q6h      fentaNYL, 0-300 mcg/hr, Last Rate: Stopped (07/21/24 0530)  norepinephrine, 0-0.2 mcg/kg/min, Last Rate: Stopped (07/20/24 1040)  propofol, 5-50 mcg/kg/min, Last Rate: Stopped (07/21/24 0530)      Propofol @ 7.02 ml/hr provides: " 185 kcals/day    Dietary Orders (From admission, onward)       Start     Ordered    07/20/24 0652  NPO Diet; Effective now  Diet effective now         07/20/24 0657                   Estimated Needs:   Estimated Energy Needs  Total Energy Estimated Needs (kCal): 1525 kCal  Total Estimated Energy Need per Day (kCal/kg): 25 kCal/kg  Method for Estimating Needs: IBW    Estimated Protein Needs  Total Protein Estimated Needs (g): 61 g  Total Protein Estimated Needs (g/kg): 1 g/kg  Method for Estimating Needs: IBW    Estimated Fluid Needs  Total Fluid Estimated Needs (mL): 1525 mL  Method for Estimating Needs: 1 mL/kcal      Nutrition Diagnosis   Nutrition Diagnosis:     Nutrition Diagnosis  Patient has Nutrition Diagnosis: Yes  Diagnosis Status (1): New  Nutrition Diagnosis 1: Inadequate energy intake  Related to (1): decreased ability to consume sufficient energy  As Evidenced by (1): NPO     Nutrition Interventions/Recommendations   Nutrition Interventions and Recommendations:    Nutrition Prescription:  Individualized Nutrition Prescription Provided for : 1525 calories, 61 gm protein when diet advances    Nutrition Interventions:   Food and/or Nutrient Delivery Interventions  Interventions: Meals and snacks  Meals and Snacks: General healthful diet  Goal: Advance per SLP recs    Education Documentation  No documentation found.         Nutrition Monitoring and Evaluation   Monitoring/Evaluation:   Food/Nutrient Related History Monitoring  Monitoring and Evaluation Plan: Energy intake  Energy Intake: Estimated energy intake  Criteria: monitoring for diet order       Time Spent/Follow-up:   Follow Up  Time Spent (min): 30 minutes  Last Date of Nutrition Visit: 07/21/24  Nutrition Follow-Up Needed?: 3-5 days  Follow up Comment: 2/24/24

## 2024-07-21 NOTE — CARE PLAN
Problem: Mechanical Ventilation  Goal: Patient Will Maintain Patent Airway  Outcome: Progressing  Goal: ET tube will be managed safely  Outcome: Progressing

## 2024-07-21 NOTE — SIGNIFICANT EVENT
Patient extubated on 4lpm spo2 95%, patient is doing well in no respiratory distress and no stridor heard.

## 2024-07-21 NOTE — PROGRESS NOTES
[facility] Critical Care Medicine       Date:  7/21/2024  Patient:  Nilam Ribeiro  YOB: 1950  MRN:  [id number]   Admit Date:  7/19/2024  ========================================================================================================    Chief Complaint   Patient presents with    Cardiac Arrest         History of Present Illness:  73 y.o. female with h/o HTN, DLP, gout, who was found unresponsive at home, EMS was called and arrived 10-20 min later. Upon their arrival, patient pulseless, BLS and ACLS was initiated. ROSC after 2 shock and 1 epi. Igel airway was placed, BMV until arrival to the ED. In the ED work up revealed severe mixed acidosis, hyperglycemia, mild leukocytosis. Patient was intubated and placed on ventilator, also found to be hypotensive requiring Levophed.  Patient transitioned to ICU for ongoing Dx and Tx.    Interval ICU Events:  7/20: Patient was given a sedation break this am and  she was awake and moving all extremities. Not following commands or any purposeful movements. SBT done with intermittent episodes of bradycardia, Pulling good volumes 350-550cc. Repeat ABG demonstrated ongoing acidosis so extubation plan suspended for 24 hours.    7/21: ABGs improving over last 24 hours, sedation paused, patient spontaneously conscious and is following simple motor commands. Plan to attempt another SBT this morning with goal of extubation if patient meets weaning parameters.    Medical History:  History reviewed. No pertinent past medical history.  History reviewed. No pertinent surgical history.  Medications Prior to Admission   Medication Sig Dispense Refill Last Dose    allopurinol (Zyloprim) 300 mg tablet Take 1 tablet (300 mg) by mouth once daily.       amLODIPine (Norvasc) 10 mg tablet Take 1 tablet (10 mg) by mouth once daily.       atorvastatin (Lipitor) 10 mg tablet Take 1 tablet (10 mg) by mouth once daily.        Patient has no known allergies.  Social History      Tobacco Use    Smoking status: Never     Passive exposure: Never (RODRICK - pt intubated and sedated)    Smokeless tobacco: Never     No family history on file.    Hospital Medications:    fentaNYL, 0-300 mcg/hr, Last Rate: Stopped (07/21/24 0530)  norepinephrine, 0-0.2 mcg/kg/min, Last Rate: Stopped (07/20/24 1040)  propofol, 5-50 mcg/kg/min, Last Rate: Stopped (07/21/24 0530)      Current Facility-Administered Medications:     dextrose 50 % injection 25 g, 25 g, intravenous, q15 min PRN, Jonathon Vásquez MD    fentanyl (Sublimaze) 1000 mcg in sodium chloride 0.9% 100 mL (10 mcg/mL) infusion (premix), 0-300 mcg/hr, intravenous, Continuous, Jon Denis MD, Stopped at 07/21/24 0530    fentaNYL PF (Sublimaze) injection 25 mcg, 25 mcg, intravenous, Once, Jon Denis MD    fentaNYL PF (Sublimaze) injection 25 mcg, 25 mcg, intravenous, q10 min PRN, Jon Denis MD, 25 mcg at 07/19/24 1335    glucagon (Glucagen) injection 1 mg, 1 mg, intramuscular, q15 min PRN, Jonathon Vásquez MD    heparin (porcine) injection 5,000 Units, 5,000 Units, subcutaneous, q8h, Norman Clark PA-C, 5,000 Units at 07/21/24 0011    insulin lispro (HumaLOG) injection 0-5 Units, 0-5 Units, subcutaneous, q4h, Jonathon Vásquez MD    norepinephrine (Levophed) 8 mg in dextrose 5% 250 mL (0.032 mg/mL) infusion (premix), 0-0.2 mcg/kg/min, intravenous, Continuous, Jon Denis MD, Stopped at 07/20/24 1040    oxygen (O2) therapy, , inhalation, Continuous - Inhalation, Jonathon Vásquez MD, 40 percent at 07/21/24 0011    pantoprazole (ProtoNix) injection 40 mg, 40 mg, intravenous, Daily, Jonathon Vásquez MD, 40 mg at 07/20/24 0942    piperacillin-tazobactam (Zosyn) 4.5 g in dextrose (iso)  mL, 4.5 g, intravenous, q6h, Jonathon Vásquez MD, Stopped at 07/21/24 0542    propofol (Diprivan) infusion, 5-50 mcg/kg/min, intravenous, Continuous, Jon Denis MD, Stopped at 07/21/24 0530    vancomycin (Vancocin)  pharmacy to dose - pharmacy monitoring, , miscellaneous, Daily PRN, Jonathon Vásquez MD    vancomycin (Xellia) 1,250 mg in diluent combination  mL, 1,250 mg, intravenous, q24h, Jonathon Vásquez MD, Stopped at 07/20/24 5701    Review of Systems:  14 point review of systems was completed and negative except for those specially mention in my HPI    Physical Exam:    Heart Rate:  []   Temp:  [36.2 °C (97.2 °F)-36.8 °C (98.2 °F)]   Resp:  [16-27]   BP: ()/()   Weight:  [98.7 kg (217 lb 9.5 oz)]   SpO2:  [93 %-100 %]     Physical Exam  Physical Exam  Constitutional:       General: She is not in acute distress.     Appearance: She is normal weight. She is ill-appearing. She is not toxic-appearing.   HENT:      Head: Normocephalic and atraumatic.      Mouth/Throat:      Comments: ET in place.   Eyes:      General: No scleral icterus.     Pupils: Pupils are equal, round, and reactive to light.      Comments: CARLOS, R3/L3 with consensual response  Cardiovascular:      Rate and Rhythm: Normal rate and regular rhythm.      Heart sounds: No murmur heard.     No friction rub. No gallop.   Pulmonary:      Effort: No respiratory distress.      Breath sounds: Bilateral breath sound present.      Comments: Wheezing noted, more prominent on exhalation  Abdominal:      General: Bowel sounds are normal. There is no distension.      Palpations: Abdomen is soft, no grimacing with palpation.      Tenderness: There is no abdominal tenderness.   Musculoskeletal:      Cervical back: Neck supple. No rigidity or tenderness.      Right lower leg: No edema.      Left lower leg: No edema.   Lymphadenopathy:      Cervical: No cervical adenopathy.   Skin:     General: Skin is warm and dry.      Coloration: Skin is not jaundiced.   Neurological:      Comments: Cannot fully assess due to intubation and sedation.  Psychiatric:      Comments: Cannot fully assess, deferred.     Objective:    I have reviewed all medications,  "laboratory results, and imaging pertinent for today's encounter. Please refer to radiology studies for all historical imaging and interpretations, please refer to EMR \"Results\" tab for all current and historical lab work.     Vent Mode: Pressure regulated volume control/assist control  FiO2 (%):  [40 %] 40 %  S RR:  [20-22] 20  S VT:  [450 mL] 450 mL  PEEP/CPAP (cm H2O):  [5 cm H20-8 cm H20] 5 cm H20  CA SUP:  [8 cm H20] 8 cm H20  MAP (cm H2O):  [7.9-12] 9      Intake/Output Summary (Last 24 hours) at 7/21/2024 0759  Last data filed at 7/21/2024 0700  Gross per 24 hour   Intake 931.2 ml   Output 845 ml   Net 86.2 ml         Assessment/Plan:    This critically ill patient is currently in the ICU being managed for the following:  -Acute metabolic encephalopathy  -s/p arrest with ROSC prehospitally  -Acute resp failure with mixed hypoxia/hypercapnea  -Lactic acidosis  -c/f Sepsis    Neuro/Psych/Pain Ctrl/Sedation:   ##Metabolic encephalopathy  -GCS = E4, V1, M6 off sedation (GCS 11T)  -Initial concern for frontal stroke given initial CT-H imaging    -->rCT-H negative    -->MRI-B negative, non-contributory  -Neurology consulted, appreciate assist    -->f/u reccs post MRI-Brain  -On fentanyl and prop, currently held for SBT  -CAM ICU score q-shift  -Sleep/wake cycle hygiene  -Delirium precaution  -Q4 neuro assessment    Pulmonary/ENT:   ##Acute respiratory failure with hypoxia and hypercapnea  -Currently intubated, on mechanical ventilation    -->Failed SBT yesterday d/t ongoing acidosis    -->Attempting SBT today, extubate when clinically able  -Maintain SpO2 at/above 92%  -Daily SBT till extubated  -High risk respiratory protocol (C&DB, IS use) when extubated    Cardiovascular: (Hx: HTN, DLD)  ##s/p V-fib arrest with ROSC prehospitally  -Hemodynamically stable, peripheral perfusion intact  -Maintain MAP >65 mmHg  -Monitor baseline vitals  -Cards consulted, appreciate assist    -->ischemic workup pending updated neuro " status    -->ED ECHO with estimated 50% EF  -Levo off at this time  -Continue home statin, ASA    Gastrointestinal:   -NPO at this time  -OG placed  -Bowel regimen    -->Dulcolax suppository PRN for constipation   -Protonix for GI PPX    Genitourinary/FEN:  ##Acute kidney injury, likely prerenal  -Creat on admission 1.1, currently 1.8  -Maintain UO >0.5ml/kg/hr    -->Target 49ml/hr, overnight 44/hr  -UO overnight: 530 ml  -Fluid bolus with mIVF  -Daily labs while in ICU  -Replete electrolytes as needed    -->Mg>2, Phos>3, K+>4  -Avoid nephrotoxins    Heme/Onc:   -Mild anemia, 10.2 on 7/21  -Transfuse for Hgb <7.0 mg/dL  -Daily CBC while in ICU    Endocrine   -BGL:  OVN  -No additional insulin requirements overnight  -Maintain BGL <180mg/dL  -Moderate regular SSI    Infectious Disease:   ##c/f Sepsis  ##Lactic acidosis (resolved)  -Tmax = 97.2 OVN  -Decreasing leukocytosis (18 from 21, 21)  -Blood cultures: NGTD x1day  -MRSA negative, vanc discontinued  -ATB: Zosyn  -Monitor for SIRS/Sepsis    MSK/SKIN: (Hx: Gout)  -Padding of pressure points  -Daily skin inspection  -Local nursing skin care    Ethics/Code Status:  -Full code.    PPX:  -DVT Prophylaxis: Heparin SQ  -GI Prophylaxis: Protonix  -Bowel Regimen: Dulcolax PRN    Tubes/Lines/Drains:  -PIV x2  -ETT  -OG tube  -R IJ triple lumen  -Orellana    :  Restraints: Soft wrist bilat    DISPO: SBT trial today, hopeful extubation. Follow up cards reccs if able to obtain better neuro exam. ICU level care, clinical course to determine.    Seen and Discussed with Dr. Gharibeh    Critical Care Time:  Total face to face time spent with patient/family of 45 minutes critical care time, with >50% of the time spent discussing plan of care/management, counseling/educating on disease processes, explaining results of diagnostic testing.    MILI Peters, M,Ed., PA-C   - [facility]  Pulmonary/Critical Care Medicine  Secure Chat Preferred

## 2024-07-21 NOTE — CONSULTS
Nutrition Consult/Progress Note    Consult received for tube feed recommendations.    Duplicate consult, see RDN assessment from 7/21/24.

## 2024-07-21 NOTE — PROGRESS NOTES
"Subjective   Patient is extubated and waking up following commands and smiling at silly jokes.     Objective   Neurological Exam  Physical Exam    Last Recorded Vitals  Blood pressure 118/65, pulse 77, temperature 36.4 °C (97.5 °F), temperature source Temporal, resp. rate 18, height 1.702 m (5' 7\"), weight 98.7 kg (217 lb 9.5 oz), SpO2 94%.      Neurologically, pt is awake and oriented to self and family.  Patient is bradyphrenic at this time.  no aphasia  Cranial nerves: VFF, EOMI, No nystagmus, Face is symmetric to sensory and motor, no dysarthria, Tongue protrudes midline, Shrug symmetric  Motor: Diffuse weakness and bradykinesia  Sensation is intact bilaterally throughout  Coordination: no ataxia     Scheduled medications  aspirin, 81 mg, oral, Daily  atorvastatin, 10 mg, oral, Nightly  fentaNYL, 25 mcg, intravenous, Once  heparin (porcine), 5,000 Units, subcutaneous, q8h  insulin lispro, 0-5 Units, subcutaneous, q4h  oxygen, , inhalation, Continuous - Inhalation  oxygen, , inhalation, Continuous - Inhalation  pantoprazole, 40 mg, intravenous, Daily  piperacillin-tazobactam, 4.5 g, intravenous, q6h      Continuous medications  fentaNYL, 0-300 mcg/hr, Last Rate: Stopped (07/21/24 0530)  norepinephrine, 0-0.2 mcg/kg/min, Last Rate: Stopped (07/20/24 1040)  propofol, 5-50 mcg/kg/min, Last Rate: Stopped (07/21/24 0530)      PRN medications  PRN medications: bisacodyl, dextrose, fentaNYL, glucagon     Results for orders placed or performed during the hospital encounter of 07/19/24 (from the past 96 hour(s))   POCT GLUCOSE   Result Value Ref Range    POCT Glucose 188 (H) 74 - 99 mg/dL   CBC and Auto Differential   Result Value Ref Range    WBC 14.7 (H) 4.4 - 11.3 x10*3/uL    nRBC 0.0 0.0 - 0.0 /100 WBCs    RBC 4.34 4.00 - 5.20 x10*6/uL    Hemoglobin 12.0 12.0 - 16.0 g/dL    Hematocrit 39.6 36.0 - 46.0 %    MCV 91 80 - 100 fL    MCH 27.6 26.0 - 34.0 pg    MCHC 30.3 (L) 32.0 - 36.0 g/dL    RDW 15.9 (H) 11.5 - 14.5 %    " Platelets 246 150 - 450 x10*3/uL    Neutrophils % 63.8 40.0 - 80.0 %    Immature Granulocytes %, Automated 4.5 (H) 0.0 - 0.9 %    Lymphocytes % 26.9 13.0 - 44.0 %    Monocytes % 3.5 2.0 - 10.0 %    Eosinophils % 0.9 0.0 - 6.0 %    Basophils % 0.4 0.0 - 2.0 %    Neutrophils Absolute 9.40 (H) 1.60 - 5.50 x10*3/uL    Immature Granulocytes Absolute, Automated 0.66 (H) 0.00 - 0.50 x10*3/uL    Lymphocytes Absolute 3.96 (H) 0.80 - 3.00 x10*3/uL    Monocytes Absolute 0.52 0.05 - 0.80 x10*3/uL    Eosinophils Absolute 0.13 0.00 - 0.40 x10*3/uL    Basophils Absolute 0.06 0.00 - 0.10 x10*3/uL   Comprehensive metabolic panel   Result Value Ref Range    Glucose 233 (H) 65 - 99 mg/dL    Sodium 141 133 - 145 mmol/L    Potassium 3.0 (L) 3.4 - 5.1 mmol/L    Chloride 105 97 - 107 mmol/L    Bicarbonate 19 (L) 24 - 31 mmol/L    Urea Nitrogen 12 8 - 25 mg/dL    Creatinine 1.10 0.40 - 1.60 mg/dL    eGFR 53 (L) >60 mL/min/1.73m*2    Calcium 8.5 8.5 - 10.4 mg/dL    Albumin 3.5 3.5 - 5.0 g/dL    Alkaline Phosphatase 122 35 - 125 U/L    Total Protein 6.8 5.9 - 7.9 g/dL     (H) 5 - 40 U/L    Bilirubin, Total 0.5 0.1 - 1.2 mg/dL    ALT 91 (H) 5 - 40 U/L    Anion Gap 17 <=19 mmol/L   Ethanol   Result Value Ref Range    Alcohol <0.010 0.000 - 0.010 g/dL   Magnesium   Result Value Ref Range    Magnesium 2.40 1.60 - 3.10 mg/dL   Bilirubin, Direct   Result Value Ref Range    Bilirubin, Direct <0.2 0.0 - 0.2 mg/dL   Serial Troponin, Initial ([facility])   Result Value Ref Range    Troponin T, High Sensitivity 18 (HH) <=14 ng/L   TSH with reflex to Free T4 if abnormal   Result Value Ref Range    Thyroid Stimulating Hormone 2.98 0.27 - 4.20 mIU/L   APTT   Result Value Ref Range    aPTT 25.5 22.0 - 32.5 seconds   Protime-INR   Result Value Ref Range    Protime 11.0 9.3 - 12.7 seconds    INR 1.0 0.9 - 1.2   Type and screen   Result Value Ref Range    ABO TYPE O     Rh TYPE POS     ANTIBODY SCREEN NEG    ECG 12 lead   Result Value Ref Range     Ventricular Rate 93 BPM    Atrial Rate 141 BPM    QRS Duration 122 ms    QT Interval 396 ms    QTC Calculation(Bazett) 492 ms    P Axis 63 degrees    R Axis -45 degrees    T Axis 124 degrees    QRS Count 15 beats    Q Onset 209 ms    T Offset 407 ms    QTC Fredericia 458 ms   Blood Gas Arterial Full Panel   Result Value Ref Range    POCT pH, Arterial 7.22 (LL) 7.38 - 7.42 pH    POCT pCO2, Arterial 46 (H) 38 - 42 mm Hg    POCT pO2, Arterial 68 (L) 85 - 95 mm Hg    POCT SO2, Arterial 92 (L) 94 - 100 %    POCT Oxy Hemoglobin, Arterial 90.9 (L) 94.0 - 98.0 %    POCT Hematocrit Calculated, Arterial 35.0 (L) 36.0 - 46.0 %    POCT Sodium, Arterial 139 136 - 145 mmol/L    POCT Potassium, Arterial 3.0 (L) 3.5 - 5.3 mmol/L    POCT Chloride, Arterial 106 98 - 107 mmol/L    POCT Ionized Calcium, Arterial 1.10 1.10 - 1.33 mmol/L    POCT Glucose, Arterial 231 (H) 74 - 99 mg/dL    POCT Lactate, Arterial 4.8 (HH) 0.4 - 2.0 mmol/L    POCT Base Excess, Arterial -8.7 (L) -2.0 - 3.0 mmol/L    POCT HCO3 Calculated, Arterial 18.8 (L) 22.0 - 26.0 mmol/L    POCT Hemoglobin, Arterial 11.6 (L) 12.0 - 16.0 g/dL    POCT Anion Gap, Arterial 17 10 - 25 mmo/L    Patient Temperature 37.0 degrees Celsius    FiO2 100 %    Ventilator Mode      Ventilator Rate 14 bpm    Tidal Volume 450 mL    Peep CHM2O 8.0 cm H2O    Site of Arterial Puncture Radial Right     Eric's Test Positive    Drug Screen, Urine   Result Value Ref Range    Amphetamine Screen, Urine Negative      Barbiturate Screen, Urine Negative      Benzodiazepines Screen, Urine Negative      Cannabinoid Screen, Urine Negative      Cocaine Metabolite Screen, Urine Negative      Fentanyl Screen, Urine Negative       Methadone Screen, Urine Negative      Opiate Screen, Urine Negative      Oxycodone Screen, Urine Negative      PCP Screen, Urine Negative     Blood Culture    Specimen: Peripheral Venipuncture; Blood culture   Result Value Ref Range    Blood Culture No growth at 1 day    Blood  Culture    Specimen: Peripheral Venipuncture; Blood culture   Result Value Ref Range    Blood Culture No growth at 1 day    Blood Gas Arterial Full Panel   Result Value Ref Range    POCT pH, Arterial 7.24 (LL) 7.38 - 7.42 pH    POCT pCO2, Arterial 47 (H) 38 - 42 mm Hg    POCT pO2, Arterial 89 85 - 95 mm Hg    POCT SO2, Arterial 97 94 - 100 %    POCT Oxy Hemoglobin, Arterial 95.2 94.0 - 98.0 %    POCT Hematocrit Calculated, Arterial 37.0 36.0 - 46.0 %    POCT Sodium, Arterial 138 136 - 145 mmol/L    POCT Potassium, Arterial 3.4 (L) 3.5 - 5.3 mmol/L    POCT Chloride, Arterial 106 98 - 107 mmol/L    POCT Ionized Calcium, Arterial 1.15 1.10 - 1.33 mmol/L    POCT Glucose, Arterial 222 (H) 74 - 99 mg/dL    POCT Lactate, Arterial 2.9 (H) 0.4 - 2.0 mmol/L    POCT Base Excess, Arterial -7.2 (L) -2.0 - 3.0 mmol/L    POCT HCO3 Calculated, Arterial 20.1 (L) 22.0 - 26.0 mmol/L    POCT Hemoglobin, Arterial 12.3 12.0 - 16.0 g/dL    POCT Anion Gap, Arterial 15 10 - 25 mmo/L    Patient Temperature 37.0 degrees Celsius    FiO2 100 %    Ventilator Mode      Ventilator Rate 16 bpm    Tidal Volume 450 mL    Peep CHM2O 8.0 cm H2O    Site of Arterial Puncture Arterial Line     Eric's Test Positive    Transthoracic Echo (TTE) Complete   Result Value Ref Range    BSA 2.18 m2   Blood Gas Arterial Full Panel   Result Value Ref Range    POCT pH, Arterial 7.33 (L) 7.38 - 7.42 pH    POCT pCO2, Arterial 43 (H) 38 - 42 mm Hg    POCT pO2, Arterial 179 (H) 85 - 95 mm Hg    POCT SO2, Arterial 99 94 - 100 %    POCT Oxy Hemoglobin, Arterial 96.9 94.0 - 98.0 %    POCT Hematocrit Calculated, Arterial 38.0 36.0 - 46.0 %    POCT Sodium, Arterial 137 136 - 145 mmol/L    POCT Potassium, Arterial 3.3 (L) 3.5 - 5.3 mmol/L    POCT Chloride, Arterial 106 98 - 107 mmol/L    POCT Ionized Calcium, Arterial 1.15 1.10 - 1.33 mmol/L    POCT Glucose, Arterial 263 (H) 74 - 99 mg/dL    POCT Lactate, Arterial 2.1 (H) 0.4 - 2.0 mmol/L    POCT Base Excess, Arterial -3.2  (L) -2.0 - 3.0 mmol/L    POCT HCO3 Calculated, Arterial 22.7 22.0 - 26.0 mmol/L    POCT Hemoglobin, Arterial 12.6 12.0 - 16.0 g/dL    POCT Anion Gap, Arterial 12 10 - 25 mmo/L    Patient Temperature 37.0 degrees Celsius    FiO2 100 %    Ventilator Mode Other     Ventilator Rate 20 bpm    Tidal Volume 450 mL    Peep CHM2O 8.0 cm H2O    Site of Arterial Puncture Arterial Line     Eric's Test Negative    POCT GLUCOSE   Result Value Ref Range    POCT Glucose 128 (H) 74 - 99 mg/dL   CBC and Auto Differential   Result Value Ref Range    WBC 21.7 (H) 4.4 - 11.3 x10*3/uL    nRBC 0.0 0.0 - 0.0 /100 WBCs    RBC 4.30 4.00 - 5.20 x10*6/uL    Hemoglobin 11.7 (L) 12.0 - 16.0 g/dL    Hematocrit 38.5 36.0 - 46.0 %    MCV 90 80 - 100 fL    MCH 27.2 26.0 - 34.0 pg    MCHC 30.4 (L) 32.0 - 36.0 g/dL    RDW 15.9 (H) 11.5 - 14.5 %    Platelets 229 150 - 450 x10*3/uL    Neutrophils % 89.6 40.0 - 80.0 %    Immature Granulocytes %, Automated 0.6 0.0 - 0.9 %    Lymphocytes % 5.4 13.0 - 44.0 %    Monocytes % 4.2 2.0 - 10.0 %    Eosinophils % 0.0 0.0 - 6.0 %    Basophils % 0.2 0.0 - 2.0 %    Neutrophils Absolute 19.45 (H) 1.60 - 5.50 x10*3/uL    Immature Granulocytes Absolute, Automated 0.13 0.00 - 0.50 x10*3/uL    Lymphocytes Absolute 1.18 0.80 - 3.00 x10*3/uL    Monocytes Absolute 0.92 (H) 0.05 - 0.80 x10*3/uL    Eosinophils Absolute 0.01 0.00 - 0.40 x10*3/uL    Basophils Absolute 0.04 0.00 - 0.10 x10*3/uL   Magnesium   Result Value Ref Range    Magnesium 2.50 1.60 - 3.10 mg/dL   Renal function panel   Result Value Ref Range    Glucose 157 (H) 65 - 99 mg/dL    Sodium 136 133 - 145 mmol/L    Potassium 3.9 3.4 - 5.1 mmol/L    Chloride 104 97 - 107 mmol/L    Bicarbonate 23 (L) 24 - 31 mmol/L    Urea Nitrogen 14 8 - 25 mg/dL    Creatinine 1.30 0.40 - 1.60 mg/dL    eGFR 44 (L) >60 mL/min/1.73m*2    Calcium 8.3 (L) 8.5 - 10.4 mg/dL    Phosphorus 2.2 (L) 2.5 - 4.5 mg/dL    Albumin 3.2 (L) 3.5 - 5.0 g/dL    Anion Gap 9 <=19 mmol/L   MRSA  Surveillance for Vancomycin De-escalation, PCR    Specimen: Anterior Nares; Swab   Result Value Ref Range    MRSA PCR Not Detected Not Detected   POCT GLUCOSE   Result Value Ref Range    POCT Glucose 124 (H) 74 - 99 mg/dL   POCT GLUCOSE   Result Value Ref Range    POCT Glucose 142 (H) 74 - 99 mg/dL   Vancomycin   Result Value Ref Range    Vancomycin 15.7 10.0 - 20.0 ug/mL   Basic metabolic panel   Result Value Ref Range    Glucose 160 (H) 65 - 99 mg/dL    Sodium 139 133 - 145 mmol/L    Potassium 4.4 3.4 - 5.1 mmol/L    Chloride 107 97 - 107 mmol/L    Bicarbonate 23 (L) 24 - 31 mmol/L    Urea Nitrogen 16 8 - 25 mg/dL    Creatinine 1.50 0.40 - 1.60 mg/dL    eGFR 37 (L) >60 mL/min/1.73m*2    Calcium 8.4 (L) 8.5 - 10.4 mg/dL    Anion Gap 9 <=19 mmol/L   CBC and Auto Differential   Result Value Ref Range    WBC 21.4 (H) 4.4 - 11.3 x10*3/uL    nRBC 0.0 0.0 - 0.0 /100 WBCs    RBC 4.22 4.00 - 5.20 x10*6/uL    Hemoglobin 11.8 (L) 12.0 - 16.0 g/dL    Hematocrit 37.5 36.0 - 46.0 %    MCV 89 80 - 100 fL    MCH 28.0 26.0 - 34.0 pg    MCHC 31.5 (L) 32.0 - 36.0 g/dL    RDW 16.0 (H) 11.5 - 14.5 %    Platelets 232 150 - 450 x10*3/uL    Neutrophils % 90.1 40.0 - 80.0 %    Immature Granulocytes %, Automated 0.8 0.0 - 0.9 %    Lymphocytes % 4.9 13.0 - 44.0 %    Monocytes % 4.0 2.0 - 10.0 %    Eosinophils % 0.0 0.0 - 6.0 %    Basophils % 0.2 0.0 - 2.0 %    Neutrophils Absolute 19.28 (H) 1.60 - 5.50 x10*3/uL    Immature Granulocytes Absolute, Automated 0.18 0.00 - 0.50 x10*3/uL    Lymphocytes Absolute 1.05 0.80 - 3.00 x10*3/uL    Monocytes Absolute 0.85 (H) 0.05 - 0.80 x10*3/uL    Eosinophils Absolute 0.00 0.00 - 0.40 x10*3/uL    Basophils Absolute 0.05 0.00 - 0.10 x10*3/uL   Magnesium   Result Value Ref Range    Magnesium 2.40 1.60 - 3.10 mg/dL   Phosphorus   Result Value Ref Range    Phosphorus 3.3 2.5 - 4.5 mg/dL   Troponin T   Result Value Ref Range    Troponin T, High Sensitivity 78 (HH) <=14 ng/L   POCT GLUCOSE   Result Value Ref  Range    POCT Glucose 136 (H) 74 - 99 mg/dL   Blood Gas Arterial Full Panel   Result Value Ref Range    POCT pH, Arterial 7.40 7.38 - 7.42 pH    POCT pCO2, Arterial 36 (L) 38 - 42 mm Hg    POCT pO2, Arterial 109 (H) 85 - 95 mm Hg    POCT SO2, Arterial 98 94 - 100 %    POCT Oxy Hemoglobin, Arterial 96.5 94.0 - 98.0 %    POCT Hematocrit Calculated, Arterial 36.0 36.0 - 46.0 %    POCT Sodium, Arterial 135 (L) 136 - 145 mmol/L    POCT Potassium, Arterial 4.2 3.5 - 5.3 mmol/L    POCT Chloride, Arterial 109 (H) 98 - 107 mmol/L    POCT Ionized Calcium, Arterial 1.17 1.10 - 1.33 mmol/L    POCT Glucose, Arterial 162 (H) 74 - 99 mg/dL    POCT Lactate, Arterial 1.2 0.4 - 2.0 mmol/L    POCT Base Excess, Arterial -2.1 (L) -2.0 - 3.0 mmol/L    POCT HCO3 Calculated, Arterial 22.3 22.0 - 26.0 mmol/L    POCT Hemoglobin, Arterial 12.0 12.0 - 16.0 g/dL    POCT Anion Gap, Arterial 8 (L) 10 - 25 mmo/L    Patient Temperature 37.0 degrees Celsius    FiO2 50 %    Apparatus      Ventilator Mode      Ventilator Rate 22 bpm    Tidal Volume 450 mL    Peep CHM2O 8.0 cm H2O    Site of Arterial Puncture Brachial Left     Eric's Test Positive    POCT GLUCOSE   Result Value Ref Range    POCT Glucose 119 (H) 74 - 99 mg/dL   Blood Gas Venous Full Panel   Result Value Ref Range    POCT pH, Venous 7.34 7.33 - 7.43 pH    POCT pCO2, Venous 43 41 - 51 mm Hg    POCT pO2, Venous 45 35 - 45 mm Hg    POCT SO2, Venous 73 45 - 75 %    POCT Oxy Hemoglobin, Venous 72.0 45.0 - 75.0 %    POCT Hematocrit Calculated, Venous 36.0 36.0 - 46.0 %    POCT Sodium, Venous 138 136 - 145 mmol/L    POCT Potassium, Venous 3.9 3.5 - 5.3 mmol/L    POCT Chloride, Venous 106 98 - 107 mmol/L    POCT Ionized Calicum, Venous 1.20 1.10 - 1.33 mmol/L    POCT Glucose, Venous 122 (H) 74 - 99 mg/dL    POCT Lactate, Venous 1.4 0.4 - 2.0 mmol/L    POCT Base Excess, Venous -2.6 (L) -2.0 - 3.0 mmol/L    POCT HCO3 Calculated, Venous 23.2 22.0 - 26.0 mmol/L    POCT Hemoglobin, Venous 12.0 12.0  - 16.0 g/dL    POCT Anion Gap, Venous 13.0 10.0 - 25.0 mmol/L    Patient Temperature 37.0 degrees Celsius    FiO2 40 %    Ventilator Mode CPAP     Ventilator Rate 23 bpm    Tidal Volume 445 mL    Spontaneous Tidal Volume 463 mL    Peep CHM2O 5.0 cm H2O    Pressure Support 5 cm H2O   POCT GLUCOSE   Result Value Ref Range    POCT Glucose 89 74 - 99 mg/dL   Troponin T   Result Value Ref Range    Troponin T, High Sensitivity 47 (HH) <=14 ng/L   Vitamin B12   Result Value Ref Range    Vitamin B12 627 211 - 946 pg/mL   Vitamin D 25-Hydroxy,Total (for eval of Vitamin D levels)   Result Value Ref Range    Vitamin D, 25-Hydroxy, Total 17 (L) 31 - 100 ng/mL   Blood Gas Venous Full Panel   Result Value Ref Range    POCT pH, Venous 7.24 (LL) 7.33 - 7.43 pH    POCT pCO2, Venous 57 (H) 41 - 51 mm Hg    POCT pO2, Venous 48 (H) 35 - 45 mm Hg    POCT SO2, Venous 75 45 - 75 %    POCT Oxy Hemoglobin, Venous 72.9 45.0 - 75.0 %    POCT Hematocrit Calculated, Venous 37.0 36.0 - 46.0 %    POCT Sodium, Venous 137 136 - 145 mmol/L    POCT Potassium, Venous 3.9 3.5 - 5.3 mmol/L    POCT Chloride, Venous 106 98 - 107 mmol/L    POCT Ionized Calicum, Venous 1.25 1.10 - 1.33 mmol/L    POCT Glucose, Venous 120 (H) 74 - 99 mg/dL    POCT Lactate, Venous 1.8 0.4 - 2.0 mmol/L    POCT Base Excess, Venous -3.7 (L) -2.0 - 3.0 mmol/L    POCT HCO3 Calculated, Venous 24.4 22.0 - 26.0 mmol/L    POCT Hemoglobin, Venous 12.4 12.0 - 16.0 g/dL    POCT Anion Gap, Venous 11.0 10.0 - 25.0 mmol/L    Patient Temperature 37.0 degrees Celsius    FiO2 40 %    Ventilator Mode CPAP     Ventilator Rate 35 bpm    Spontaneous Tidal Volume 343 mL    PC Pressure Control 5.0 cm H2O    Peep CHM2O 5.0 cm H2O   POCT GLUCOSE   Result Value Ref Range    POCT Glucose 99 74 - 99 mg/dL   POCT GLUCOSE   Result Value Ref Range    POCT Glucose 97 74 - 99 mg/dL   POCT GLUCOSE   Result Value Ref Range    POCT Glucose 109 (H) 74 - 99 mg/dL   BLOOD GAS ARTERIAL FULL PANEL   Result Value Ref  Range    POCT pH, Arterial 7.43 (H) 7.38 - 7.42 pH    POCT pCO2, Arterial 33 (L) 38 - 42 mm Hg    POCT pO2, Arterial 96 (H) 85 - 95 mm Hg    POCT SO2, Arterial 98 94 - 100 %    POCT Oxy Hemoglobin, Arterial 95.9 94.0 - 98.0 %    POCT Hematocrit Calculated, Arterial 32.0 (L) 36.0 - 46.0 %    POCT Sodium, Arterial 134 (L) 136 - 145 mmol/L    POCT Potassium, Arterial 3.8 3.5 - 5.3 mmol/L    POCT Chloride, Arterial 109 (H) 98 - 107 mmol/L    POCT Ionized Calcium, Arterial 1.17 1.10 - 1.33 mmol/L    POCT Glucose, Arterial 103 (H) 74 - 99 mg/dL    POCT Lactate, Arterial 1.0 0.4 - 2.0 mmol/L    POCT Base Excess, Arterial -1.9 -2.0 - 3.0 mmol/L    POCT HCO3 Calculated, Arterial 21.9 (L) 22.0 - 26.0 mmol/L    POCT Hemoglobin, Arterial 10.7 (L) 12.0 - 16.0 g/dL    POCT Anion Gap, Arterial 7 (L) 10 - 25 mmo/L    Patient Temperature 37.0 degrees Celsius    FiO2 40 %    Apparatus      Ventilator Mode      Ventilator Rate 20 bpm    Tidal Volume 450 mL    Peep CHM2O 5.0 cm H2O    Site of Arterial Puncture Radial Right     Eric's Test Positive    Basic metabolic panel   Result Value Ref Range    Glucose 113 (H) 65 - 99 mg/dL    Sodium 138 133 - 145 mmol/L    Potassium 4.1 3.4 - 5.1 mmol/L    Chloride 105 97 - 107 mmol/L    Bicarbonate 23 (L) 24 - 31 mmol/L    Urea Nitrogen 19 8 - 25 mg/dL    Creatinine 1.80 (H) 0.40 - 1.60 mg/dL    eGFR 29 (L) >60 mL/min/1.73m*2    Calcium 8.4 (L) 8.5 - 10.4 mg/dL    Anion Gap 10 <=19 mmol/L   CBC and Auto Differential   Result Value Ref Range    WBC 18.0 (H) 4.4 - 11.3 x10*3/uL    nRBC 0.0 0.0 - 0.0 /100 WBCs    RBC 3.73 (L) 4.00 - 5.20 x10*6/uL    Hemoglobin 10.2 (L) 12.0 - 16.0 g/dL    Hematocrit 32.7 (L) 36.0 - 46.0 %    MCV 88 80 - 100 fL    MCH 27.3 26.0 - 34.0 pg    MCHC 31.2 (L) 32.0 - 36.0 g/dL    RDW 16.1 (H) 11.5 - 14.5 %    Platelets 163 150 - 450 x10*3/uL    Neutrophils % 89.5 40.0 - 80.0 %    Immature Granulocytes %, Automated 0.4 0.0 - 0.9 %    Lymphocytes % 6.4 13.0 - 44.0 %     Monocytes % 3.4 2.0 - 10.0 %    Eosinophils % 0.1 0.0 - 6.0 %    Basophils % 0.2 0.0 - 2.0 %    Neutrophils Absolute 16.10 (H) 1.60 - 5.50 x10*3/uL    Immature Granulocytes Absolute, Automated 0.08 0.00 - 0.50 x10*3/uL    Lymphocytes Absolute 1.15 0.80 - 3.00 x10*3/uL    Monocytes Absolute 0.61 0.05 - 0.80 x10*3/uL    Eosinophils Absolute 0.01 0.00 - 0.40 x10*3/uL    Basophils Absolute 0.03 0.00 - 0.10 x10*3/uL   Magnesium   Result Value Ref Range    Magnesium 2.20 1.60 - 3.10 mg/dL   Phosphorus   Result Value Ref Range    Phosphorus 4.3 2.5 - 4.5 mg/dL   Vancomycin   Result Value Ref Range    Vancomycin 21.8 (H) 10.0 - 20.0 ug/mL   Calcium, ionized   Result Value Ref Range    POCT Calcium, Ionized 1.13 1.1 - 1.33 mmol/L   POCT GLUCOSE   Result Value Ref Range    POCT Glucose 113 (H) 74 - 99 mg/dL   POCT GLUCOSE   Result Value Ref Range    POCT Glucose 87 74 - 99 mg/dL          XR chest 1 view    Result Date: 7/21/2024  Interpreted By:  Ailyn Gonzalez, STUDY: XR CHEST 1 VIEW;  7/21/2024 6:10 am   INDICATION: Signs/Symptoms:Intubated patient in ICU, comparison.   COMPARISON: 07/1924   ACCESSION NUMBER(S): [id number]   ORDERING CLINICIAN: SHRUTI SONI   FINDINGS: CARDIOMEDIASTINAL SILHOUETTE: The heart is enlarged but unchanged. There is an endotracheal tube with the tip 5.4 cm above the kolby. There is a right internal jugular central venous catheter with the tip in the superior vena cava.   LUNGS: There are bilateral patchy alveolar opacities with dense consolidation at the left lung base and obscuration of the left hemidiaphragm. This could represent pulmonary edema or pneumonia. This has improved.   ABDOMEN: No remarkable upper abdominal findings. There is a nasogastric tube with the distal tip not included but the tip and side-hole are beyond the gastroesophageal junction.   BONES: No acute osseous changes.       1. Satisfactory position endotracheal tube and right internal jugular central venous catheter.  2. Bilateral patchy alveolar opacities consistent with pneumonia or pulmonary edema; there has been improvement.   MACRO: None   Signed by: Ailyn Lisa 7/21/2024 12:03 PM Dictation workstation:   [id number]    MR brain wo IV contrast    Result Date: 7/20/2024  Interpreted By:  Mariano Varela, STUDY: MR BRAIN WO IV CONTRAST;  7/20/2024 3:54 pm   INDICATION: Signs/Symptoms:Acute stroke protocol.   COMPARISON: None.   ACCESSION NUMBER(S): [id number]   ORDERING CLINICIAN: ANABELA CALLAWAY   TECHNIQUE: Standard multiplanar multisequence MR imaging was performed through the brain without intravenous contrast. Axial T2, FLAIR, DWI, gradient echo T2 and sagittal and coronal T1 weighted images of brain were acquired.  Motion artifact degrades assessment on several sequences.   FINDINGS: Parenchyma: There is no diffusion restriction abnormality to suggest acute infarct.  No evidence of recent hemorrhage when allowing for significant motion degradation on GRE sequence. There is no mass effect or midline shift. There are patchy as well as confluent T2/FLAIR white matter hyperintensities within the bilateral cerebral hemispheres, favor chronic small vessel ischemic disease, with further assessment limited by motion artifact.   CSF Spaces: Nonspecific partial empty sella. The ventricles, sulci and basal cisterns are grossly within normal limits for age with aforementioned motion artifact limiting evaluation on multiple sequences. Basilar cisterns are patent.   Extra-axial spaces: No extra-axial fluid collection.   Paranasal Sinuses: Probable mucosal thickening, particularly involving the left maxillary paranasal sinus, not well assessed.   Mastoids: Grossly clear.   Orbits: Not well assessed.   Calvarium: No suspicious osseous marrow signal.       Examination is degraded by varying degrees of motion artifact. No evidence of acute infarct or intracranial mass effect. Probable mild-to-moderate chronic small vessel  ischemic disease.   MACRO: None   Signed by: Mariano Varela 7/20/2024 4:23 PM Dictation workstation:   [id number]    CT head wo IV contrast    Result Date: 7/20/2024  Interpreted By:  Mariano Varela, STUDY: CT HEAD WO IV CONTRAST;  7/19/2024 11:00 pm   INDICATION: Signs/Symptoms:cerebral edema.   COMPARISON: CT head dated 07/19/2024 at 14:52   ACCESSION NUMBER(S): [id number]   ORDERING CLINICIAN: ZULEMA MICHAEL   TECHNIQUE: Noncontrast axial CT scan of head was performed.   FINDINGS: Parenchyma: There is no intracranial hemorrhage. The grey-white differentiation is intact. There is no mass effect or midline shift. Moderate chronic small vessel ischemic disease suggested with patchy as well as confluent white matter hypodensities within the bilateral cerebral hemispheres. There is suspected small focus of encephalomalacia within the right frontal opercular region corresponding to previous abnormality (series 9, image 72 and series 3, image 44).   CSF Spaces: Nonspecific partial empty sella. The ventricles, sulci and basal cisterns are within normal limits for age with mild generalized brain atrophy. No CT apparent cerebral edema.   Extra-Axial Fluid: There is no extraaxial fluid collection.   Calvarium: The calvarium is unremarkable.   Paranasal sinuses: Extensive paranasal sinus disease with near-complete opacification of the left frontal sinus. There is scattered near-complete and partial opacification of numerous bilateral ethmoidal air cells. There is dependent fluid/secretions with partial opacification of the sphenoid sinuses bilaterally. There is complete opacification of the left maxillary paranasal sinus as well as dependent secretions and mucosal thickening within the right maxillary paranasal sinus. There is hyperostosis reflecting chronic sinusitis, most pronounced within the maxillary paranasal sinuses bilaterally. There are additional secretions opacifying the nasal cavity and visualized  nasopharynx.   Mastoids: Clear.   Orbits: Normal.   Soft tissues: Unremarkable.       No acute intracranial hemorrhage, mass effect, or CT apparent acute infarct. Specifically, there is no CT evidence of cerebral edema with mild senescent change. Area of concern within the cortical/subcortical aspect of the right frontal operculum is favored to represent encephalomalacia from prior insult. This could be further characterized with MRI brain as clinically warranted.   Additional moderate chronic small vessel ischemic disease.   Severe chronic paranasal sinus disease as described in detail above.   MACRO: None   Signed by: Mariano Varela 7/20/2024 3:11 PM Dictation workstation:   [id number]    XR chest 1 view    Result Date: 7/19/2024  Interpreted By:  Katie Gilliam, STUDY: XR CHEST 1 VIEW 7/19/2024 3:36 pm   INDICATION: Triple-lumen catheter placement   COMPARISON: 07/19/2024 done at 1:17 p.m.   ACCESSION NUMBER(S): [id number]   ORDERING CLINICIAN: ZULEMA MICHAEL   TECHNIQUE: Recumbent view of the chest at bedside   FINDINGS: A right jugular CVP line has been placed with tip terminating in the SVC. There is no pneumothorax.   Endotracheal tube remains satisfactorily positioned with tip 4.8 cm above kolby. Nasogastric tube descends below diaphragm.   The cardiac size is stable. Bilateral pulmonary infiltrates are once again observed and appear a little worse on the left but stable on the right.       Right jugular CVP line terminating in SVC without pneumothorax.   Bilateral pulmonary infiltrates stable on the right but worsening on the left.   Signed by: Katie Gilliam 7/19/2024 3:58 PM Dictation workstation:   [id number]    CT head wo IV contrast    Result Date: 7/19/2024  Interpreted By:  Cristal Sullivan, STUDY: CT HEAD WO IV CONTRAST;  7/19/2024 2:52 pm   INDICATION: Signs/Symptoms:AMS, OSH cardiac arrest.   COMPARISON: None.   ACCESSION NUMBER(S): [id number]   ORDERING CLINICIAN: FLETCHER ABREU   TECHNIQUE:  Noncontrast axial CT scan of head was performed.   FINDINGS: Parenchyma: No intracranial hemorrhage. No midline shift. Small hypodensity in the right frontal lobe with blurring of gray-white junction (series 10, image 26). There is diffuse sulcal effacement along the right cerebral hemisphere relative to the left.   CSF Spaces: The ventricles, sulci and basal cisterns are within normal limits for age.   Extra-Axial Fluid: None.   Calvarium: No acute fracture.   Paranasal sinuses: Mucosal thickening of left frontal sinus. Near-complete opacification of ethmoid air cells and complete opacification of left maxillary sinus. Small air-fluid level in the right maxillary sinus.   Mastoids: Clear.   Orbits: No acute abnormality.   Soft tissues: No acute abnormality.       Small right frontal lobe hypodensity with blurring of gray-white junction, concerning for possible acute infarct.   Diffuse sulcal effacement along the right cerebral hemisphere relative to the left suggesting cerebral edema. No midline shift, ventricular or sulcal effacement.   MACRO Cristal Sullivan discussed the significance and urgency of this critical finding by Epic secure chat with  FLETCHER ABREU on 7/19/2024 at 3:29 pm.  (**-RCF-**) Findings:  See findings.     Signed by: Cristal Sullivan 7/19/2024 3:30 PM Dictation workstation:   [id number]    ECG 12 lead    Result Date: 7/19/2024  Sinus tachycardia with 2nd degree AV block (Mobitz I) with occasional Premature ventricular complexes Left axis deviation Abnormal ECG No previous ECGs available    XR chest 1 view    Result Date: 7/19/2024  Interpreted By:  Dario Khalil, STUDY: XR CHEST 1 VIEW;  7/19/2024 1:27 pm   INDICATION: Signs/Symptoms:Post intubation.   COMPARISON: None.   ACCESSION NUMBER(S): [id number]   ORDERING CLINICIAN: FLETCHER ABREU   FINDINGS: ET tube 2.4 cm above the kolby. Feeding tube tip passes below the GE junction   CARDIOMEDIASTINAL SILHOUETTE: Cardiomediastinal  silhouette is normal in size and configuration.   LUNGS: Extensive dense airspace disease at the lungs especially in the right upper lobe. A component of mild edema is present.   ABDOMEN: No remarkable upper abdominal findings.   BONES: No acute osseous changes.       1.  Extensive bilateral multifocal airspace disease worse at the right upper lung. Underlying pneumonia or atelectasis in the differential. 2. ET tube and feeding tube in place.       MACRO: None   Signed by: Dario Khalil 7/19/2024 1:41 PM Dictation workstation:   [id number]       Assessment/Plan   Principal Problem:    Cardiac arrest (Multi)    73-year-old woman is waking up.  There is some question as to whether she had a cardiac arrest or seizure.  I have ordered an EEG and will follow-up those results tomorrow.  Her bradykinesia and bradyphrenia today do suggest some degree of hypoxic brain injury.  The delayed acute renal failure to me also suggests a global hypoxic event, this likely related to seizure.  Will reassess tomorrow.     I personally spent 41 minutes today, exclusive of procedures, providing care for this patient, including preparation, face to face time, documentation and other services such as review of medical records, diagnostic result, patient education, counseling, coordination of care as specified in the encounter.

## 2024-07-21 NOTE — CARE PLAN
Problem: Knowledge Deficit  Goal: Patient/family/caregiver demonstrates understanding of disease process, treatment plan, medications, and discharge instructions  Outcome: Progressing     Problem: Mechanical Ventilation  Goal: Patient Will Maintain Patent Airway  Outcome: Progressing  Goal: Oral health is maintained or improved  Outcome: Progressing  Goal: ET tube will be managed safely  Outcome: Progressing  Goal: Ability to express needs and understand communication  Outcome: Progressing  Goal: Mobility/activity is maintained at optimum level for patient  Outcome: Progressing     Problem: Pain - Adult  Goal: Verbalizes/displays adequate comfort level or baseline comfort level  Outcome: Progressing     Problem: Safety - Adult  Goal: Free from fall injury  Outcome: Progressing     Problem: Discharge Planning  Goal: Discharge to home or other facility with appropriate resources  Outcome: Progressing     Problem: Chronic Conditions and Co-morbidities  Goal: Patient's chronic conditions and co-morbidity symptoms are monitored and maintained or improved  Outcome: Progressing     Problem: Skin  Goal: Decreased wound size/increased tissue granulation at next dressing change  Outcome: Progressing  Goal: Participates in plan/prevention/treatment measures  Outcome: Progressing  Goal: Prevent/manage excess moisture  Outcome: Progressing  Goal: Prevent/minimize sheer/friction injuries  Outcome: Progressing  Goal: Promote/optimize nutrition  Outcome: Progressing  Goal: Promote skin healing  Outcome: Progressing     Problem: Nutrition  Goal: Less than 5 days NPO/clear liquids  Outcome: Progressing  Goal: Oral intake greater than 50%  Outcome: Progressing  Goal: Oral intake greater 75%  Outcome: Progressing  Goal: Consume prescribed supplement  Outcome: Progressing  Goal: Adequate PO fluid intake  Outcome: Progressing  Goal: Nutrition support goals are met within 48 hrs  Outcome: Progressing  Goal: Nutrition support is meeting  75% of nutrient needs  Outcome: Progressing  Goal: Tube feed tolerance  Outcome: Progressing  Goal: BG  mg/dL  Outcome: Progressing  Goal: Lab values WNL  Outcome: Progressing  Goal: Electrolytes WNL  Outcome: Progressing  Goal: Promote healing  Outcome: Progressing  Goal: Maintain stable weight  Outcome: Progressing     Problem: Fall/Injury  Goal: Not fall by end of shift  Outcome: Progressing  Goal: Be free from injury by end of the shift  Outcome: Progressing  Goal: Verbalize understanding of personal risk factors for fall in the hospital  Outcome: Progressing  Goal: Verbalize understanding of risk factor reduction measures to prevent injury from fall in the home  Outcome: Progressing  Goal: Use assistive devices by end of the shift  Outcome: Progressing  Goal: Pace activities to prevent fatigue by end of the shift  Outcome: Progressing     Problem: Safety - Medical Restraint  Goal: Remains free of injury from restraints (Restraint for Interference with Medical Device)  Outcome: Progressing  Goal: Free from restraint(s) (Restraint for Interference with Medical Device)  Outcome: Progressing     Problem: Risk for falls  Goal: I will remain free from falls  Outcome: Progressing   The patient's goals for the shift include  safety    The clinical goals for the shift include comfort    Over the shift, the patient did not make progress toward the following goals. Barriers to progression include none. Recommendations to address these barriers include none.

## 2024-07-22 LAB
ANION GAP SERPL CALC-SCNC: 12 MMOL/L
APTT PPP: 48.1 SECONDS (ref 22–32.5)
ATRIAL RATE: 141 BPM
ATRIAL RATE: 57 BPM
BASOPHILS # BLD AUTO: 0.05 X10*3/UL (ref 0–0.1)
BASOPHILS NFR BLD AUTO: 0.3 %
BUN SERPL-MCNC: 16 MG/DL (ref 8–25)
CALCIUM SERPL-MCNC: 8.5 MG/DL (ref 8.5–10.4)
CHLORIDE SERPL-SCNC: 104 MMOL/L (ref 97–107)
CO2 SERPL-SCNC: 24 MMOL/L (ref 24–31)
CREAT SERPL-MCNC: 1.3 MG/DL (ref 0.4–1.6)
EGFRCR SERPLBLD CKD-EPI 2021: 44 ML/MIN/1.73M*2
EOSINOPHIL # BLD AUTO: 0.03 X10*3/UL (ref 0–0.4)
EOSINOPHIL NFR BLD AUTO: 0.2 %
ERYTHROCYTE [DISTWIDTH] IN BLOOD BY AUTOMATED COUNT: 15.9 % (ref 11.5–14.5)
GLUCOSE BLD MANUAL STRIP-MCNC: 118 MG/DL (ref 74–99)
GLUCOSE BLD MANUAL STRIP-MCNC: 130 MG/DL (ref 74–99)
GLUCOSE BLD MANUAL STRIP-MCNC: 72 MG/DL (ref 74–99)
GLUCOSE BLD MANUAL STRIP-MCNC: 82 MG/DL (ref 74–99)
GLUCOSE BLD MANUAL STRIP-MCNC: 87 MG/DL (ref 74–99)
GLUCOSE BLD MANUAL STRIP-MCNC: 88 MG/DL (ref 74–99)
GLUCOSE BLD MANUAL STRIP-MCNC: 96 MG/DL (ref 74–99)
GLUCOSE SERPL-MCNC: 91 MG/DL (ref 65–99)
HCT VFR BLD AUTO: 31.6 % (ref 36–46)
HGB BLD-MCNC: 10.1 G/DL (ref 12–16)
IMM GRANULOCYTES # BLD AUTO: 0.08 X10*3/UL (ref 0–0.5)
IMM GRANULOCYTES NFR BLD AUTO: 0.5 % (ref 0–0.9)
LYMPHOCYTES # BLD AUTO: 1.16 X10*3/UL (ref 0.8–3)
LYMPHOCYTES NFR BLD AUTO: 7.3 %
MAGNESIUM SERPL-MCNC: 2 MG/DL (ref 1.6–3.1)
MCH RBC QN AUTO: 27.9 PG (ref 26–34)
MCHC RBC AUTO-ENTMCNC: 32 G/DL (ref 32–36)
MCV RBC AUTO: 87 FL (ref 80–100)
MONOCYTES # BLD AUTO: 0.74 X10*3/UL (ref 0.05–0.8)
MONOCYTES NFR BLD AUTO: 4.6 %
NEUTROPHILS # BLD AUTO: 13.93 X10*3/UL (ref 1.6–5.5)
NEUTROPHILS NFR BLD AUTO: 87.1 %
NRBC BLD-RTO: 0 /100 WBCS (ref 0–0)
P AXIS: 63 DEGREES
P AXIS: 80 DEGREES
P OFFSET: 158 MS
P ONSET: 113 MS
PHOSPHATE SERPL-MCNC: 2.8 MG/DL (ref 2.5–4.5)
PLATELET # BLD AUTO: 164 X10*3/UL (ref 150–450)
POTASSIUM SERPL-SCNC: 3.2 MMOL/L (ref 3.4–5.1)
POTASSIUM SERPL-SCNC: 3.7 MMOL/L (ref 3.4–5.1)
PR INTERVAL: 218 MS
Q ONSET: 209 MS
Q ONSET: 222 MS
QRS COUNT: 12 BEATS
QRS COUNT: 15 BEATS
QRS DURATION: 116 MS
QRS DURATION: 122 MS
QT INTERVAL: 396 MS
QT INTERVAL: 444 MS
QTC CALCULATION(BAZETT): 472 MS
QTC CALCULATION(BAZETT): 492 MS
QTC FREDERICIA: 458 MS
QTC FREDERICIA: 463 MS
R AXIS: -40 DEGREES
R AXIS: -45 DEGREES
RBC # BLD AUTO: 3.62 X10*6/UL (ref 4–5.2)
SODIUM SERPL-SCNC: 140 MMOL/L (ref 133–145)
T AXIS: 124 DEGREES
T AXIS: 62 DEGREES
T OFFSET: 407 MS
T OFFSET: 444 MS
VENTRICULAR RATE: 68 BPM
VENTRICULAR RATE: 93 BPM
WBC # BLD AUTO: 16 X10*3/UL (ref 4.4–11.3)

## 2024-07-22 PROCEDURE — 2500000005 HC RX 250 GENERAL PHARMACY W/O HCPCS: Performed by: INTERNAL MEDICINE

## 2024-07-22 PROCEDURE — 85730 THROMBOPLASTIN TIME PARTIAL: CPT | Performed by: INTERNAL MEDICINE

## 2024-07-22 PROCEDURE — 84132 ASSAY OF SERUM POTASSIUM: CPT | Performed by: REGISTERED NURSE

## 2024-07-22 PROCEDURE — 97530 THERAPEUTIC ACTIVITIES: CPT | Mod: GP

## 2024-07-22 PROCEDURE — 82947 ASSAY GLUCOSE BLOOD QUANT: CPT

## 2024-07-22 PROCEDURE — 80048 BASIC METABOLIC PNL TOTAL CA: CPT

## 2024-07-22 PROCEDURE — 85025 COMPLETE CBC W/AUTO DIFF WBC: CPT

## 2024-07-22 PROCEDURE — 37799 UNLISTED PX VASCULAR SURGERY: CPT | Performed by: REGISTERED NURSE

## 2024-07-22 PROCEDURE — 2500000004 HC RX 250 GENERAL PHARMACY W/ HCPCS (ALT 636 FOR OP/ED): Performed by: INTERNAL MEDICINE

## 2024-07-22 PROCEDURE — 92610 EVALUATE SWALLOWING FUNCTION: CPT | Mod: GN | Performed by: SPEECH-LANGUAGE PATHOLOGIST

## 2024-07-22 PROCEDURE — 83735 ASSAY OF MAGNESIUM: CPT

## 2024-07-22 PROCEDURE — 2500000004 HC RX 250 GENERAL PHARMACY W/ HCPCS (ALT 636 FOR OP/ED)

## 2024-07-22 PROCEDURE — 2500000004 HC RX 250 GENERAL PHARMACY W/ HCPCS (ALT 636 FOR OP/ED): Performed by: STUDENT IN AN ORGANIZED HEALTH CARE EDUCATION/TRAINING PROGRAM

## 2024-07-22 PROCEDURE — 99291 CRITICAL CARE FIRST HOUR: CPT | Performed by: STUDENT IN AN ORGANIZED HEALTH CARE EDUCATION/TRAINING PROGRAM

## 2024-07-22 PROCEDURE — 2020000001 HC ICU ROOM DAILY

## 2024-07-22 PROCEDURE — 37799 UNLISTED PX VASCULAR SURGERY: CPT | Performed by: INTERNAL MEDICINE

## 2024-07-22 PROCEDURE — 84100 ASSAY OF PHOSPHORUS: CPT

## 2024-07-22 PROCEDURE — 97163 PT EVAL HIGH COMPLEX 45 MIN: CPT | Mod: GP

## 2024-07-22 PROCEDURE — C9113 INJ PANTOPRAZOLE SODIUM, VIA: HCPCS | Performed by: INTERNAL MEDICINE

## 2024-07-22 PROCEDURE — 97166 OT EVAL MOD COMPLEX 45 MIN: CPT | Mod: GO

## 2024-07-22 PROCEDURE — 97530 THERAPEUTIC ACTIVITIES: CPT | Mod: GO

## 2024-07-22 PROCEDURE — 2500000005 HC RX 250 GENERAL PHARMACY W/O HCPCS

## 2024-07-22 RX ORDER — DEXTROSE 50 % IN WATER (D50W) INTRAVENOUS SYRINGE
25
Status: DISCONTINUED | OUTPATIENT
Start: 2024-07-22 | End: 2024-07-22

## 2024-07-22 RX ORDER — DEXTROSE 50 % IN WATER (D50W) INTRAVENOUS SYRINGE
12.5
Status: DISCONTINUED | OUTPATIENT
Start: 2024-07-22 | End: 2024-08-02 | Stop reason: HOSPADM

## 2024-07-22 RX ORDER — POTASSIUM CHLORIDE 14.9 MG/ML
20 INJECTION INTRAVENOUS
Status: COMPLETED | OUTPATIENT
Start: 2024-07-22 | End: 2024-07-23

## 2024-07-22 RX ORDER — POTASSIUM CHLORIDE 14.9 MG/ML
20 INJECTION INTRAVENOUS
Status: COMPLETED | OUTPATIENT
Start: 2024-07-22 | End: 2024-07-22

## 2024-07-22 RX ORDER — HALOPERIDOL 5 MG/ML
2.5 INJECTION INTRAMUSCULAR ONCE
Status: COMPLETED | OUTPATIENT
Start: 2024-07-22 | End: 2024-07-22

## 2024-07-22 RX ORDER — ACETAMINOPHEN 325 MG/1
650 TABLET ORAL EVERY 6 HOURS PRN
Status: DISCONTINUED | OUTPATIENT
Start: 2024-07-22 | End: 2024-08-02 | Stop reason: HOSPADM

## 2024-07-22 RX ADMIN — ACETAMINOPHEN 650 MG: 325 TABLET ORAL at 14:51

## 2024-07-22 RX ADMIN — POTASSIUM CHLORIDE 20 MEQ: 14.9 INJECTION, SOLUTION INTRAVENOUS at 08:17

## 2024-07-22 RX ADMIN — Medication 4 L/MIN: at 08:00

## 2024-07-22 RX ADMIN — POTASSIUM CHLORIDE 20 MEQ: 14.9 INJECTION, SOLUTION INTRAVENOUS at 20:10

## 2024-07-22 RX ADMIN — POTASSIUM CHLORIDE 20 MEQ: 14.9 INJECTION, SOLUTION INTRAVENOUS at 22:32

## 2024-07-22 RX ADMIN — HALOPERIDOL LACTATE 2.5 MG: 5 INJECTION, SOLUTION INTRAMUSCULAR at 19:28

## 2024-07-22 RX ADMIN — METOPROLOL TARTRATE 5 MG: 5 INJECTION INTRAVENOUS at 21:33

## 2024-07-22 RX ADMIN — POTASSIUM CHLORIDE 20 MEQ: 14.9 INJECTION, SOLUTION INTRAVENOUS at 06:37

## 2024-07-22 RX ADMIN — METOPROLOL TARTRATE 5 MG: 5 INJECTION INTRAVENOUS at 08:16

## 2024-07-22 RX ADMIN — METOPROLOL TARTRATE 5 MG: 5 INJECTION INTRAVENOUS at 15:38

## 2024-07-22 RX ADMIN — PIPERACILLIN SODIUM AND TAZOBACTAM SODIUM 4.5 G: 4; .5 INJECTION, SOLUTION INTRAVENOUS at 04:15

## 2024-07-22 RX ADMIN — HALOPERIDOL LACTATE 2.5 MG: 5 INJECTION, SOLUTION INTRAMUSCULAR at 21:13

## 2024-07-22 RX ADMIN — PIPERACILLIN SODIUM AND TAZOBACTAM SODIUM 4.5 G: 4; .5 INJECTION, SOLUTION INTRAVENOUS at 22:32

## 2024-07-22 RX ADMIN — PANTOPRAZOLE SODIUM 40 MG: 40 INJECTION, POWDER, FOR SOLUTION INTRAVENOUS at 08:15

## 2024-07-22 RX ADMIN — Medication 5 L/MIN: at 21:40

## 2024-07-22 RX ADMIN — PIPERACILLIN SODIUM AND TAZOBACTAM SODIUM 4.5 G: 4; .5 INJECTION, SOLUTION INTRAVENOUS at 17:00

## 2024-07-22 RX ADMIN — METOPROLOL TARTRATE 5 MG: 5 INJECTION INTRAVENOUS at 03:38

## 2024-07-22 RX ADMIN — PIPERACILLIN SODIUM AND TAZOBACTAM SODIUM 4.5 G: 4; .5 INJECTION, SOLUTION INTRAVENOUS at 10:29

## 2024-07-22 RX ADMIN — DEXTROSE MONOHYDRATE 12.5 G: 25 INJECTION, SOLUTION INTRAVENOUS at 04:43

## 2024-07-22 RX ADMIN — Medication 40 PERCENT: at 21:40

## 2024-07-22 SDOH — SOCIAL STABILITY: SOCIAL INSECURITY: ARE YOU MARRIED, WIDOWED, DIVORCED, SEPARATED, NEVER MARRIED, OR LIVING WITH A PARTNER?: MARRIED

## 2024-07-22 SDOH — SOCIAL STABILITY: SOCIAL NETWORK
IN A TYPICAL WEEK, HOW MANY TIMES DO YOU TALK ON THE PHONE WITH FAMILY, FRIENDS, OR NEIGHBORS?: MORE THAN THREE TIMES A WEEK

## 2024-07-22 SDOH — SOCIAL STABILITY: SOCIAL INSECURITY: WITHIN THE LAST YEAR, HAVE YOU BEEN AFRAID OF YOUR PARTNER OR EX-PARTNER?: PATIENT UNABLE TO ANSWER

## 2024-07-22 SDOH — SOCIAL STABILITY: SOCIAL INSECURITY
WITHIN THE LAST YEAR, HAVE TO BEEN RAPED OR FORCED TO HAVE ANY KIND OF SEXUAL ACTIVITY BY YOUR PARTNER OR EX-PARTNER?: PATIENT UNABLE TO ANSWER

## 2024-07-22 SDOH — SOCIAL STABILITY: SOCIAL NETWORK
DO YOU BELONG TO ANY CLUBS OR ORGANIZATIONS SUCH AS CHURCH GROUPS, UNIONS, FRATERNAL OR ATHLETIC GROUPS, OR SCHOOL GROUPS?: NO

## 2024-07-22 SDOH — HEALTH STABILITY: MENTAL HEALTH: HOW OFTEN DO YOU HAVE 6 OR MORE DRINKS ON ONE OCCASION?: NEVER

## 2024-07-22 SDOH — ECONOMIC STABILITY: HOUSING INSECURITY: AT ANY TIME IN THE PAST 12 MONTHS, WERE YOU HOMELESS OR LIVING IN A SHELTER (INCLUDING NOW)?: NO

## 2024-07-22 SDOH — HEALTH STABILITY: PHYSICAL HEALTH
HOW OFTEN DO YOU NEED TO HAVE SOMEONE HELP YOU WHEN YOU READ INSTRUCTIONS, PAMPHLETS, OR OTHER WRITTEN MATERIAL FROM YOUR DOCTOR OR PHARMACY?: PATIENT UNABLE TO RESPOND

## 2024-07-22 SDOH — HEALTH STABILITY: MENTAL HEALTH
DO YOU FEEL STRESS - TENSE, RESTLESS, NERVOUS, OR ANXIOUS, OR UNABLE TO SLEEP AT NIGHT BECAUSE YOUR MIND IS TROUBLED ALL THE TIME - THESE DAYS?: PATIENT UNABLE TO ANSWER

## 2024-07-22 SDOH — ECONOMIC STABILITY: FOOD INSECURITY: WITHIN THE PAST 12 MONTHS, YOU WORRIED THAT YOUR FOOD WOULD RUN OUT BEFORE YOU GOT THE MONEY TO BUY MORE.: NEVER TRUE

## 2024-07-22 SDOH — HEALTH STABILITY: MENTAL HEALTH: HOW MANY DRINKS CONTAINING ALCOHOL DO YOU HAVE ON A TYPICAL DAY WHEN YOU ARE DRINKING?: PATIENT DOES NOT DRINK

## 2024-07-22 SDOH — ECONOMIC STABILITY: INCOME INSECURITY: IN THE LAST 12 MONTHS, WAS THERE A TIME WHEN YOU WERE NOT ABLE TO PAY THE MORTGAGE OR RENT ON TIME?: NO

## 2024-07-22 SDOH — SOCIAL STABILITY: SOCIAL INSECURITY
WITHIN THE LAST YEAR, HAVE YOU BEEN KICKED, HIT, SLAPPED, OR OTHERWISE PHYSICALLY HURT BY YOUR PARTNER OR EX-PARTNER?: PATIENT UNABLE TO ANSWER

## 2024-07-22 SDOH — HEALTH STABILITY: PHYSICAL HEALTH: ON AVERAGE, HOW MANY MINUTES DO YOU ENGAGE IN EXERCISE AT THIS LEVEL?: 0 MIN

## 2024-07-22 SDOH — SOCIAL STABILITY: SOCIAL NETWORK: HOW OFTEN DO YOU GET TOGETHER WITH FRIENDS OR RELATIVES?: MORE THAN THREE TIMES A WEEK

## 2024-07-22 SDOH — HEALTH STABILITY: PHYSICAL HEALTH: ON AVERAGE, HOW MANY DAYS PER WEEK DO YOU ENGAGE IN MODERATE TO STRENUOUS EXERCISE (LIKE A BRISK WALK)?: 0 DAYS

## 2024-07-22 SDOH — HEALTH STABILITY: MENTAL HEALTH: HOW OFTEN DO YOU HAVE SIX OR MORE DRINKS ON ONE OCCASION?: NEVER

## 2024-07-22 SDOH — ECONOMIC STABILITY: INCOME INSECURITY: IN THE PAST 12 MONTHS HAS THE ELECTRIC, GAS, OIL, OR WATER COMPANY THREATENED TO SHUT OFF SERVICES IN YOUR HOME?: NO

## 2024-07-22 SDOH — SOCIAL STABILITY: SOCIAL NETWORK: HOW OFTEN DO YOU ATTENT MEETINGS OF THE CLUB OR ORGANIZATION YOU BELONG TO?: NEVER

## 2024-07-22 SDOH — HEALTH STABILITY: MENTAL HEALTH
STRESS IS WHEN SOMEONE FEELS TENSE, NERVOUS, ANXIOUS, OR CAN'T SLEEP AT NIGHT BECAUSE THEIR MIND IS TROUBLED. HOW STRESSED ARE YOU?: PATIENT UNABLE TO ANSWER

## 2024-07-22 SDOH — SOCIAL STABILITY: SOCIAL INSECURITY
WITHIN THE LAST YEAR, HAVE YOU BEEN RAPED OR FORCED TO HAVE ANY KIND OF SEXUAL ACTIVITY BY YOUR PARTNER OR EX-PARTNER?: PATIENT UNABLE TO ANSWER

## 2024-07-22 SDOH — ECONOMIC STABILITY: FOOD INSECURITY: WITHIN THE PAST 12 MONTHS, YOU WORRIED THAT YOUR FOOD WOULD RUN OUT BEFORE YOU GOT MONEY TO BUY MORE.: NEVER TRUE

## 2024-07-22 SDOH — ECONOMIC STABILITY: TRANSPORTATION INSECURITY: IN THE PAST 12 MONTHS, HAS LACK OF TRANSPORTATION KEPT YOU FROM MEDICAL APPOINTMENTS OR FROM GETTING MEDICATIONS?: NO

## 2024-07-22 SDOH — ECONOMIC STABILITY: INCOME INSECURITY: IN THE PAST 12 MONTHS, HAS THE ELECTRIC, GAS, OIL, OR WATER COMPANY THREATENED TO SHUT OFF SERVICE IN YOUR HOME?: NO

## 2024-07-22 SDOH — ECONOMIC STABILITY: FOOD INSECURITY: HOW HARD IS IT FOR YOU TO PAY FOR THE VERY BASICS LIKE FOOD, HOUSING, MEDICAL CARE, AND HEATING?: NOT VERY HARD

## 2024-07-22 SDOH — SOCIAL STABILITY: SOCIAL NETWORK
DO YOU BELONG TO ANY CLUBS OR ORGANIZATIONS SUCH AS CHURCH GROUPS UNIONS, FRATERNAL OR ATHLETIC GROUPS, OR SCHOOL GROUPS?: NO

## 2024-07-22 SDOH — HEALTH STABILITY: MENTAL HEALTH: HOW MANY STANDARD DRINKS CONTAINING ALCOHOL DO YOU HAVE ON A TYPICAL DAY?: PATIENT DOES NOT DRINK

## 2024-07-22 SDOH — ECONOMIC STABILITY: TRANSPORTATION INSECURITY
IN THE PAST 12 MONTHS, HAS LACK OF TRANSPORTATION KEPT YOU FROM MEETINGS, WORK, OR FROM GETTING THINGS NEEDED FOR DAILY LIVING?: NO

## 2024-07-22 SDOH — ECONOMIC STABILITY: TRANSPORTATION INSECURITY
IN THE PAST 12 MONTHS, HAS THE LACK OF TRANSPORTATION KEPT YOU FROM MEDICAL APPOINTMENTS OR FROM GETTING MEDICATIONS?: NO

## 2024-07-22 SDOH — SOCIAL STABILITY: SOCIAL INSECURITY
WITHIN THE LAST YEAR, HAVE YOU BEEN HUMILIATED OR EMOTIONALLY ABUSED IN OTHER WAYS BY YOUR PARTNER OR EX-PARTNER?: PATIENT UNABLE TO ANSWER

## 2024-07-22 SDOH — ECONOMIC STABILITY: FOOD INSECURITY: WITHIN THE PAST 12 MONTHS, THE FOOD YOU BOUGHT JUST DIDN'T LAST AND YOU DIDN'T HAVE MONEY TO GET MORE.: NEVER TRUE

## 2024-07-22 SDOH — ECONOMIC STABILITY: HOUSING INSECURITY: IN THE PAST 12 MONTHS, HOW MANY TIMES HAVE YOU MOVED WHERE YOU WERE LIVING?: 0

## 2024-07-22 SDOH — ECONOMIC STABILITY: HOUSING INSECURITY: IN THE LAST 12 MONTHS, WAS THERE A TIME WHEN YOU WERE NOT ABLE TO PAY THE MORTGAGE OR RENT ON TIME?: NO

## 2024-07-22 SDOH — SOCIAL STABILITY: SOCIAL NETWORK: HOW OFTEN DO YOU ATTEND CHURCH OR RELIGIOUS SERVICES?: NEVER

## 2024-07-22 SDOH — SOCIAL STABILITY: SOCIAL NETWORK: HOW OFTEN DO YOU ATTEND MEETINGS OF THE CLUBS OR ORGANIZATIONS YOU BELONG TO?: NEVER

## 2024-07-22 SDOH — ECONOMIC STABILITY: INCOME INSECURITY: HOW HARD IS IT FOR YOU TO PAY FOR THE VERY BASICS LIKE FOOD, HOUSING, MEDICAL CARE, AND HEATING?: NOT VERY HARD

## 2024-07-22 SDOH — SOCIAL STABILITY: SOCIAL NETWORK: ARE YOU MARRIED, WIDOWED, DIVORCED, SEPARATED, NEVER MARRIED, OR LIVING WITH A PARTNER?: MARRIED

## 2024-07-22 ASSESSMENT — COGNITIVE AND FUNCTIONAL STATUS - GENERAL
TURNING FROM BACK TO SIDE WHILE IN FLAT BAD: A LOT
WALKING IN HOSPITAL ROOM: A LOT
MOVING TO AND FROM BED TO CHAIR: A LOT
STANDING UP FROM CHAIR USING ARMS: TOTAL
DAILY ACTIVITIY SCORE: 10
PERSONAL GROOMING: A LOT
TOILETING: TOTAL
HELP NEEDED FOR BATHING: A LOT
CLIMB 3 TO 5 STEPS WITH RAILING: TOTAL
TOILETING: TOTAL
DRESSING REGULAR UPPER BODY CLOTHING: A LOT
DRESSING REGULAR LOWER BODY CLOTHING: A LOT
STANDING UP FROM CHAIR USING ARMS: A LOT
HELP NEEDED FOR BATHING: A LOT
DAILY ACTIVITIY SCORE: 11
EATING MEALS: A LOT
PERSONAL GROOMING: A LOT
WALKING IN HOSPITAL ROOM: TOTAL
DRESSING REGULAR LOWER BODY CLOTHING: TOTAL
MOBILITY SCORE: 13
MOVING FROM LYING ON BACK TO SITTING ON SIDE OF FLAT BED WITH BEDRAILS: TOTAL
DRESSING REGULAR UPPER BODY CLOTHING: A LOT
MOBILITY SCORE: 6
MOVING TO AND FROM BED TO CHAIR: TOTAL
TURNING FROM BACK TO SIDE WHILE IN FLAT BAD: TOTAL
CLIMB 3 TO 5 STEPS WITH RAILING: A LOT
MOVING FROM LYING ON BACK TO SITTING ON SIDE OF FLAT BED WITH BEDRAILS: A LITTLE
EATING MEALS: A LOT

## 2024-07-22 ASSESSMENT — PAIN SCALES - GENERAL
PAINLEVEL_OUTOF10: 0 - NO PAIN
PAINLEVEL_OUTOF10: 3
PAINLEVEL_OUTOF10: 3
PAINLEVEL_OUTOF10: 0 - NO PAIN
PAINLEVEL_OUTOF10: 3
PAINLEVEL_OUTOF10: 0 - NO PAIN

## 2024-07-22 ASSESSMENT — ACTIVITIES OF DAILY LIVING (ADL)
ADL_ASSISTANCE: INDEPENDENT
LACK_OF_TRANSPORTATION: NO
BATHING_ASSISTANCE: MAXIMAL
ADL_ASSISTANCE: INDEPENDENT
LACK_OF_TRANSPORTATION: NO

## 2024-07-22 ASSESSMENT — PAIN - FUNCTIONAL ASSESSMENT
PAIN_FUNCTIONAL_ASSESSMENT: 0-10
PAIN_FUNCTIONAL_ASSESSMENT: FLACC (FACE, LEGS, ACTIVITY, CRY, CONSOLABILITY)
PAIN_FUNCTIONAL_ASSESSMENT: FLACC (FACE, LEGS, ACTIVITY, CRY, CONSOLABILITY)
PAIN_FUNCTIONAL_ASSESSMENT: 0-10

## 2024-07-22 ASSESSMENT — PAIN DESCRIPTION - LOCATION: LOCATION: HEAD

## 2024-07-22 NOTE — NURSING NOTE
Pt desating on 4L NC.  O2 increased to 6L then to nonrebreather while pt continued to desat into the mid 80's.  RENEA and MD at bedside, ABG and stat chest xray ordered.  Pt turned onto right side and Spo2 inproved.  Tube feed off at this time.

## 2024-07-22 NOTE — CARE PLAN
Problem: Knowledge Deficit  Goal: Patient/family/caregiver demonstrates understanding of disease process, treatment plan, medications, and discharge instructions  Outcome: Progressing     Problem: Mechanical Ventilation  Goal: Patient Will Maintain Patent Airway  Outcome: Progressing  Goal: Oral health is maintained or improved  Outcome: Progressing  Goal: ET tube will be managed safely  Outcome: Progressing  Goal: Ability to express needs and understand communication  Outcome: Progressing  Goal: Mobility/activity is maintained at optimum level for patient  Outcome: Progressing     Problem: Pain - Adult  Goal: Verbalizes/displays adequate comfort level or baseline comfort level  Outcome: Progressing     Problem: Safety - Adult  Goal: Free from fall injury  Outcome: Progressing     Problem: Discharge Planning  Goal: Discharge to home or other facility with appropriate resources  Outcome: Progressing     Problem: Chronic Conditions and Co-morbidities  Goal: Patient's chronic conditions and co-morbidity symptoms are monitored and maintained or improved  Outcome: Progressing     Problem: Skin  Goal: Decreased wound size/increased tissue granulation at next dressing change  Outcome: Progressing  Goal: Participates in plan/prevention/treatment measures  Outcome: Progressing  Goal: Prevent/manage excess moisture  Outcome: Progressing  Goal: Prevent/minimize sheer/friction injuries  Outcome: Progressing  Goal: Promote/optimize nutrition  Outcome: Progressing  Goal: Promote skin healing  Outcome: Progressing     Problem: Nutrition  Goal: Less than 5 days NPO/clear liquids  Outcome: Progressing  Goal: Oral intake greater than 50%  Outcome: Progressing  Goal: Oral intake greater 75%  Outcome: Progressing  Goal: Consume prescribed supplement  Outcome: Progressing  Goal: Adequate PO fluid intake  Outcome: Progressing  Goal: Nutrition support goals are met within 48 hrs  Outcome: Progressing  Goal: Nutrition support is meeting  75% of nutrient needs  Outcome: Progressing  Goal: Tube feed tolerance  Outcome: Progressing  Goal: BG  mg/dL  Outcome: Progressing  Goal: Lab values WNL  Outcome: Progressing  Goal: Electrolytes WNL  Outcome: Progressing  Goal: Promote healing  Outcome: Progressing  Goal: Maintain stable weight  Outcome: Progressing     Problem: Fall/Injury  Goal: Not fall by end of shift  Outcome: Progressing  Goal: Be free from injury by end of the shift  Outcome: Progressing  Goal: Verbalize understanding of personal risk factors for fall in the hospital  Outcome: Progressing  Goal: Verbalize understanding of risk factor reduction measures to prevent injury from fall in the home  Outcome: Progressing  Goal: Use assistive devices by end of the shift  Outcome: Progressing  Goal: Pace activities to prevent fatigue by end of the shift  Outcome: Progressing     Problem: Safety - Medical Restraint  Goal: Remains free of injury from restraints (Restraint for Interference with Medical Device)  Outcome: Progressing  Goal: Free from restraint(s) (Restraint for Interference with Medical Device)  Outcome: Progressing     Problem: Risk for falls  Goal: I will remain free from falls  Outcome: Progressing     Problem: ADLs  Goal: Pt will complete ADL tasks at mod I with use of AE prn   Outcome: Progressing   The patient's goals for the shift include comfort.     The clinical goals for the shift include improve orientation    Over the shift, the patient did not make progress toward the following goals. Barriers to progression include none.   Recommendations to address these barriers include .none

## 2024-07-22 NOTE — CARE PLAN
Problem: Knowledge Deficit  Goal: Patient/family/caregiver demonstrates understanding of disease process, treatment plan, medications, and discharge instructions  7/21/2024 2117 by Neri Dey RN  Outcome: Progressing  7/21/2024 2116 by Neri Dey RN  Outcome: Progressing     Problem: Mechanical Ventilation  Goal: Patient Will Maintain Patent Airway  7/21/2024 2117 by Neri Dey RN  Outcome: Progressing  7/21/2024 2116 by Neri Dey RN  Outcome: Progressing  Goal: Oral health is maintained or improved  7/21/2024 2117 by Neri Dey RN  Outcome: Progressing  7/21/2024 2116 by Neri Dey RN  Outcome: Progressing  Goal: ET tube will be managed safely  7/21/2024 2117 by Neri Dey RN  Outcome: Progressing  7/21/2024 2116 by Neri Dey RN  Outcome: Progressing  Goal: Ability to express needs and understand communication  7/21/2024 2117 by Neri Dey RN  Outcome: Progressing  7/21/2024 2116 by Neri Dey RN  Outcome: Progressing  Goal: Mobility/activity is maintained at optimum level for patient  7/21/2024 2117 by Neri Dey RN  Outcome: Progressing  7/21/2024 2116 by Neri Dey RN  Outcome: Progressing     Problem: Pain - Adult  Goal: Verbalizes/displays adequate comfort level or baseline comfort level  7/21/2024 2117 by Neri Dey RN  Outcome: Progressing  7/21/2024 2116 by Neri Dey RN  Outcome: Progressing     Problem: Safety - Adult  Goal: Free from fall injury  7/21/2024 2117 by Neri Dey RN  Outcome: Progressing  7/21/2024 2116 by Neri Dey RN  Outcome: Progressing     Problem: Discharge Planning  Goal: Discharge to home or other facility with appropriate resources  7/21/2024 2117 by Neri Dey RN  Outcome: Progressing  7/21/2024 2116 by Neri Dey RN  Outcome: Progressing     Problem: Chronic Conditions and Co-morbidities  Goal: Patient's chronic conditions and co-morbidity symptoms are  monitored and maintained or improved  7/21/2024 2117 by Neri Dey RN  Outcome: Progressing  7/21/2024 2116 by Neri Dey RN  Outcome: Progressing     Problem: Skin  Goal: Decreased wound size/increased tissue granulation at next dressing change  7/21/2024 2117 by Neri Dey RN  Outcome: Progressing  7/21/2024 2116 by Neri Dey RN  Outcome: Progressing  Goal: Participates in plan/prevention/treatment measures  7/21/2024 2117 by Neri Dey RN  Outcome: Progressing  7/21/2024 2116 by Neri Dey RN  Outcome: Progressing  Goal: Prevent/manage excess moisture  7/21/2024 2117 by Neri Dey RN  Outcome: Progressing  7/21/2024 2116 by Neri Dey RN  Outcome: Progressing  Goal: Prevent/minimize sheer/friction injuries  7/21/2024 2117 by Neri Dey RN  Outcome: Progressing  7/21/2024 2116 by Neri Dey RN  Outcome: Progressing  Goal: Promote/optimize nutrition  7/21/2024 2117 by Neri Dey RN  Outcome: Progressing  7/21/2024 2116 by Neri Dey RN  Outcome: Progressing  Goal: Promote skin healing  7/21/2024 2117 by Neri Dey RN  Outcome: Progressing  7/21/2024 2116 by Neri Dey RN  Outcome: Progressing     Problem: Nutrition  Goal: Less than 5 days NPO/clear liquids  7/21/2024 2117 by Neri Dey RN  Outcome: Progressing  7/21/2024 2116 by Neri Dey RN  Outcome: Progressing  Goal: Oral intake greater than 50%  7/21/2024 2117 by Neri Dey RN  Outcome: Progressing  7/21/2024 2116 by Neri Dey RN  Outcome: Progressing  Goal: Oral intake greater 75%  7/21/2024 2117 by Neri Dey RN  Outcome: Progressing  7/21/2024 2116 by Neri Dey RN  Outcome: Progressing  Goal: Consume prescribed supplement  7/21/2024 2117 by Neri Dey RN  Outcome: Progressing  7/21/2024 2116 by Neri Tiburcio, RN  Outcome: Progressing  Goal: Adequate PO fluid intake  7/21/2024 2117 by Neri Dey,  RN  Outcome: Progressing  7/21/2024 2116 by Neri Dey RN  Outcome: Progressing  Goal: Nutrition support goals are met within 48 hrs  7/21/2024 2117 by Neri Dey RN  Outcome: Progressing  7/21/2024 2116 by Neri Dey RN  Outcome: Progressing  Goal: Nutrition support is meeting 75% of nutrient needs  7/21/2024 2117 by Neri Dey, RN  Outcome: Progressing  7/21/2024 2116 by Neri Dey, RN  Outcome: Progressing  Goal: Tube feed tolerance  7/21/2024 2117 by Neri Dey RN  Outcome: Progressing  7/21/2024 2116 by Neri Dey, RN  Outcome: Progressing  Goal: BG  mg/dL  7/21/2024 2117 by Neri Dey, RN  Outcome: Progressing  7/21/2024 2116 by Neri Dey RN  Outcome: Progressing  Goal: Lab values WNL  7/21/2024 2117 by Neri Dey, RN  Outcome: Progressing  7/21/2024 2116 by Neri Dey, RN  Outcome: Progressing  Goal: Electrolytes WNL  7/21/2024 2117 by Neri Dey, RN  Outcome: Progressing  7/21/2024 2116 by Neri Dey RN  Outcome: Progressing  Goal: Promote healing  7/21/2024 2117 by Neri Dey, RN  Outcome: Progressing  7/21/2024 2116 by Neri Dey RN  Outcome: Progressing  Goal: Maintain stable weight  7/21/2024 2117 by Neri Dey, RN  Outcome: Progressing  7/21/2024 2116 by Neri Dey RN  Outcome: Progressing     Problem: Fall/Injury  Goal: Not fall by end of shift  7/21/2024 2117 by Neri Dey, RN  Outcome: Progressing  7/21/2024 2116 by Neri Dey, RN  Outcome: Progressing  Goal: Be free from injury by end of the shift  7/21/2024 2117 by Neri Dey, RN  Outcome: Progressing  7/21/2024 2116 by Neri Dey, RN  Outcome: Progressing  Goal: Verbalize understanding of personal risk factors for fall in the hospital  7/21/2024 2117 by Neri Dey, RN  Outcome: Progressing  7/21/2024 2116 by Neri Dey, RN  Outcome: Progressing  Goal: Verbalize understanding of risk factor  reduction measures to prevent injury from fall in the home  7/21/2024 2117 by Neri Dey RN  Outcome: Progressing  7/21/2024 2116 by Neri Dey RN  Outcome: Progressing  Goal: Use assistive devices by end of the shift  7/21/2024 2117 by Neri Dey RN  Outcome: Progressing  7/21/2024 2116 by Neri Dey RN  Outcome: Progressing  Goal: Pace activities to prevent fatigue by end of the shift  7/21/2024 2117 by Neri Dey RN  Outcome: Progressing  7/21/2024 2116 by Neri Dey RN  Outcome: Progressing     Problem: Safety - Medical Restraint  Goal: Remains free of injury from restraints (Restraint for Interference with Medical Device)  7/21/2024 2117 by Neri Dey RN  Outcome: Progressing  7/21/2024 2116 by Neri Dey RN  Outcome: Progressing  Goal: Free from restraint(s) (Restraint for Interference with Medical Device)  7/21/2024 2117 by Neri Dey RN  Outcome: Progressing  7/21/2024 2116 by Neri Dey RN  Outcome: Progressing     Problem: Risk for falls  Goal: I will remain free from falls  7/21/2024 2117 by Neri Dey RN  Outcome: Progressing  7/21/2024 2116 by Neri Dey RN  Outcome: Progressing   The patient's goals for the shift include comfort     The clinical goals for the shift include safety    Over the shift, the patient did not make progress toward the following goals. Barriers to progression include none. Recommendations to address these barriers include none.

## 2024-07-22 NOTE — PROGRESS NOTES
Occupational Therapy    Evaluation/Treatment    Patient Name: Nilam Powell  MRN: [id number]  : 1950  Today's Date: 24  Time Calculation  Start Time: 1327  Stop Time: 1350  Time Calculation (min): 23 min       Assessment:  OT Assessment: Pt would benefit from acute OT services to address deficits in ADLs/IADLs, tranfers, and functional mobility  End of Session Communication: Bedside nurse  End of Session Patient Position: Bed, 4 rail up, Alarm on (all needs in reach, RN aware)  OT Assessment Results: Decreased ADL status, Decreased upper extremity range of motion, Decreased upper extremity strength, Decreased safe judgment during ADL, Decreased cognition, Decreased endurance, Decreased functional mobility, Decreased gross motor control, Decreased IADLs  Strengths: Support of extended family/friends, Support and attitude of living partners, Premorbid level of function  Plan:  Treatment Interventions: ADL retraining, Functional transfer training, UE strengthening/ROM, Endurance training, Cognitive reorientation, Equipment evaluation/education, Patient/family training  OT Frequency: 5 times per week  OT Discharge Recommendations: Moderate intensity level of continued care  OT - OK to Discharge: Yes  Treatment Interventions: ADL retraining, Functional transfer training, UE strengthening/ROM, Endurance training, Cognitive reorientation, Equipment evaluation/education, Patient/family training    Subjective   Current Problem:  1. Cardiac arrest (Multi)        2. Hypotension, unspecified hypotension type        3. Acute heart failure, unspecified heart failure type (Multi)        4. Altered mental status, unspecified altered mental status type          General:   OT Received On: 24  General  Reason for Referral: Pt admitted from home after being found unresponsive. Upon EMS arrival pt was found to be pulseless, CPR was performed. Pt was found to be in ventricular fibrillation, underwent cardioversion. Pt  was intubated 7/19 extubated 7/21  Referred By: Demetrice Mendoza PA-C  Past Medical History Relevant to Rehab: HTN, DLP, gout, uterine fibroid, Colonoscopy, hysterectomy  Family/Caregiver Present: Yes (spouse present)  Co-Treatment: PT  Prior to Session Communication: Bedside nurse  Patient Position Received: Bed, 4 rail up, Alarm on  General Comment: Cleared by nursing. Pt confused but agreeable to therapy  Precautions:  Hearing/Visual Limitations: Glasses  Medical Precautions: Fall precautions, Oxygen therapy device and L/min (4LO2)  Vital Signs:  Heart Rate: 68  SpO2: 91 %  BP: 143/85  MAP (mmHg): 102  Pain:  Pain Assessment  Pain Assessment: FLACC (Face, Legs, Activity, Cry, Consolability)  0-10 (Numeric) Pain Score: 3  Pain Type: Acute pain  Pain Location: Generalized (with movement)  Pain Interventions: Repositioned    Objective   Cognition:  Overall Cognitive Status: Impaired  Orientation Level: Disoriented to place, Disoriented to time, Disoriented to situation  Memory:  (Impaired)  Safety/Judgement:  (Impaired)  Insight: Moderate  Impulsive: Mildly           Home Living:  Type of Home: House  Lives With: Spouse, Adult children (Pt lives with spouse and 4 adult children)  Home Adaptive Equipment: Walker rolling or standard, Cane  Home Layout: Multi-level, Laundry in basement, Able to live on main level with bedroom/bathroom, Full bath main level  Home Access: Stairs to enter with rails, Stairs to enter without rails (1+2 with no rail side entrance; 3 with one rail front door; pts spouse reports they use the side entrance)  Entrance Stairs-Rails: None  Entrance Stairs-Number of Steps: 1+2 (side entrance)  Bathroom Shower/Tub: Tub/shower unit  Bathroom Equipment: Grab bars in shower, Shower chair without back  Prior Function:  Level of [place]: Independent with homemaking with ambulation, Independent with ADLs and functional transfers  Receives Help From: Family  ADL Assistance: Independent  Homemaking  Assistance: Independent (Shares with spouse)  Driving/Transportation: Family/Friend  Ambulatory Assistance: Independent  Hand Dominance: Left  Prior Function Comments: Pts spouse present and able to assist with intake information, reports pt was indep PTA, denied h/o falls       ADL:  Eating Assistance: Moderate  Grooming Assistance: Moderate  Bathing Assistance: Maximal  UE Dressing Assistance: Maximal  LE Dressing Assistance: Total  Toileting Assistance with Device: Total  Toileting Deficit: Urinary Catheter  Activities of Daily Living:      LE Dressing  LE Dressing: Yes  Sock Level of Assistance: Dependent  LE Dressing Where Assessed: Bed level  LE Dressing Comments: donning/doffing socks       Activity Tolerance:  Endurance: Decreased tolerance for upright activites     Bed Mobility/Transfers: Bed Mobility  Bed Mobility:  (Max A x2 for trunk upright and assist with BLEs supine<>seated EOB)    Transfers  Transfer: No    Functional Mobility:  Functional Mobility  Functional Mobility Performed: No  Sitting Balance:  Static Sitting Balance  Static Sitting-Level of Assistance: Minimum assistance     Vision:Vision - Basic Assessment  Current Vision: Wears glasses all the time  Sensation:  Sensation Comment: Pt denied numbness/paresthesia BUEs  Strength:  Strength Comments: grossly 3/5 BUEs    Hand Function:  Hand Function  Gross Grasp: Functional  Coordination: Functional  Extremities: RUE   RUE :  (shoulder flexion grossly 90 degrees; WFL distally; decreased coordination noted) and LUE   LUE: Within Functional Limits (increased effort to perform AROM, decreased coordination noted)    Outcome Measures: [facility] Daily Activity  Putting on and taking off regular lower body clothing: Total  Bathing (including washing, rinsing, drying): A lot  Putting on and taking off regular upper body clothing: A lot  Toileting, which includes using toilet, bedpan or urinal: Total  Taking care of personal grooming such as brushing teeth:  A lot  Eating Meals: A lot  Daily Activity - Total Score: 10    Education Documentation  ADL Training, taught by Priya Mckinney OT at 7/22/2024  2:48 PM.  Learner: Patient  Readiness: Acceptance  Method: Explanation, Demonstration  Response: Needs Reinforcement    Education Comments  No comments found.      Goals:  Encounter Problems       Encounter Problems (Active)       ADLs       Pt will complete ADL tasks at mod I with use of AE prn  (Progressing)       Start:  07/22/24    Expected End:  08/26/24               Functional Mobility       Pt will perform functional mobility household distances at mod I with use of RW.    (Progressing)       Start:  07/22/24    Expected End:  08/26/24               OT Transfers       Pt will perform BUE exercises to improve strength and independence with functional transfers/ADL tasks.   (Progressing)       Start:  07/22/24    Expected End:  08/26/24

## 2024-07-22 NOTE — PROGRESS NOTES
Spiritual Care Visit    Clinical Encounter Type  Visited With: Patient  Routine Visit: Introduction    Anabaptist Encounters  Anabaptist Needs: Spiritual care brochure, Prayer     Spiritual care Note:     Patient received a visit from the Spiritual care volunteer while admitted.  This patient was triaged for their emotional and spiritual need consistent with their belief system and supported accordingly.'

## 2024-07-22 NOTE — CARE PLAN
Problem: Knowledge Deficit  Goal: Patient/family/caregiver demonstrates understanding of disease process, treatment plan, medications, and discharge instructions  7/21/2024 2117 by Neri Dey RN  Outcome: Progressing  7/21/2024 2117 by Neri Dey RN  Outcome: Progressing  7/21/2024 2116 by Neri Dey RN  Outcome: Progressing     Problem: Mechanical Ventilation  Goal: Patient Will Maintain Patent Airway  7/21/2024 2117 by Neri Dey RN  Outcome: Progressing  7/21/2024 2117 by Neri Dey RN  Outcome: Progressing  7/21/2024 2116 by Neri Dey RN  Outcome: Progressing  Goal: Oral health is maintained or improved  7/21/2024 2117 by Neri Dey RN  Outcome: Progressing  7/21/2024 2117 by Neri Dey RN  Outcome: Progressing  7/21/2024 2116 by Neri Dey RN  Outcome: Progressing  Goal: ET tube will be managed safely  7/21/2024 2117 by Neri Dey RN  Outcome: Progressing  7/21/2024 2117 by Neri Dey RN  Outcome: Progressing  7/21/2024 2116 by Neri Dey RN  Outcome: Progressing  Goal: Ability to express needs and understand communication  7/21/2024 2117 by Neri Dey RN  Outcome: Progressing  7/21/2024 2117 by Neri Dey RN  Outcome: Progressing  7/21/2024 2116 by Neri Dey RN  Outcome: Progressing  Goal: Mobility/activity is maintained at optimum level for patient  7/21/2024 2117 by Neri Dey RN  Outcome: Progressing  7/21/2024 2117 by Neri Dey RN  Outcome: Progressing  7/21/2024 2116 by Neri Dey RN  Outcome: Progressing     Problem: Pain - Adult  Goal: Verbalizes/displays adequate comfort level or baseline comfort level  7/21/2024 2117 by Neri Dey RN  Outcome: Progressing  7/21/2024 2117 by Neri Dey RN  Outcome: Progressing  7/21/2024 2116 by Neri Dey RN  Outcome: Progressing     Problem: Safety - Adult  Goal: Free from fall injury  7/21/2024 2117 by Neri  DEMARCUS Dey  Outcome: Progressing  7/21/2024 2117 by Neri Dey RN  Outcome: Progressing  7/21/2024 2116 by Neri Dey RN  Outcome: Progressing     Problem: Discharge Planning  Goal: Discharge to home or other facility with appropriate resources  7/21/2024 2117 by Neri Dey RN  Outcome: Progressing  7/21/2024 2117 by Neri Dey RN  Outcome: Progressing  7/21/2024 2116 by Neri Dey RN  Outcome: Progressing     Problem: Chronic Conditions and Co-morbidities  Goal: Patient's chronic conditions and co-morbidity symptoms are monitored and maintained or improved  7/21/2024 2117 by Neri Dey RN  Outcome: Progressing  7/21/2024 2117 by Neri Dey RN  Outcome: Progressing  7/21/2024 2116 by Neri Dey RN  Outcome: Progressing     Problem: Skin  Goal: Decreased wound size/increased tissue granulation at next dressing change  7/21/2024 2117 by Neri Dey RN  Outcome: Progressing  7/21/2024 2117 by Neri Dey RN  Outcome: Progressing  7/21/2024 2116 by Neri Dey RN  Outcome: Progressing  Goal: Participates in plan/prevention/treatment measures  7/21/2024 2117 by Neri Dey RN  Outcome: Progressing  7/21/2024 2117 by Neri Dey RN  Outcome: Progressing  7/21/2024 2116 by Neri Dey RN  Outcome: Progressing  Goal: Prevent/manage excess moisture  7/21/2024 2117 by Neri Dey RN  Outcome: Progressing  7/21/2024 2117 by Neri Dey RN  Outcome: Progressing  7/21/2024 2116 by Neri Dey RN  Outcome: Progressing  Goal: Prevent/minimize sheer/friction injuries  7/21/2024 2117 by Neri Dey RN  Outcome: Progressing  7/21/2024 2117 by Neri Dey RN  Outcome: Progressing  7/21/2024 2116 by Neri Dey RN  Outcome: Progressing  Goal: Promote/optimize nutrition  7/21/2024 2117 by Neri Dey RN  Outcome: Progressing  7/21/2024 2117 by Neri Dey, RN  Outcome: Progressing  7/21/2024 2116  by Neri Dey RN  Outcome: Progressing  Goal: Promote skin healing  7/21/2024 2117 by Neri Dey RN  Outcome: Progressing  7/21/2024 2117 by Neri Dey RN  Outcome: Progressing  7/21/2024 2116 by Neri Dey RN  Outcome: Progressing     Problem: Nutrition  Goal: Less than 5 days NPO/clear liquids  7/21/2024 2117 by Neri Dey RN  Outcome: Progressing  7/21/2024 2117 by Neri Dey RN  Outcome: Progressing  7/21/2024 2116 by Neri Dey RN  Outcome: Progressing  Goal: Oral intake greater than 50%  7/21/2024 2117 by Neri Dey RN  Outcome: Progressing  7/21/2024 2117 by Neri Dey RN  Outcome: Progressing  7/21/2024 2116 by Neri Dey RN  Outcome: Progressing  Goal: Oral intake greater 75%  7/21/2024 2117 by Neri Dey, RN  Outcome: Progressing  7/21/2024 2117 by Neri Dey RN  Outcome: Progressing  7/21/2024 2116 by Neri Dey RN  Outcome: Progressing  Goal: Consume prescribed supplement  7/21/2024 2117 by Neri Dey RN  Outcome: Progressing  7/21/2024 2117 by Neri Dey RN  Outcome: Progressing  7/21/2024 2116 by Neri Dey RN  Outcome: Progressing  Goal: Adequate PO fluid intake  7/21/2024 2117 by Neri Dey RN  Outcome: Progressing  7/21/2024 2117 by Neri Dey RN  Outcome: Progressing  7/21/2024 2116 by Neri Dey RN  Outcome: Progressing  Goal: Nutrition support goals are met within 48 hrs  7/21/2024 2117 by Neri Dey, RN  Outcome: Progressing  7/21/2024 2117 by Neri Dey RN  Outcome: Progressing  7/21/2024 2116 by Neri Dey RN  Outcome: Progressing  Goal: Nutrition support is meeting 75% of nutrient needs  7/21/2024 2117 by Neri Dey RN  Outcome: Progressing  7/21/2024 2117 by Neri Dey RN  Outcome: Progressing  7/21/2024 2116 by Neri Dey, RN  Outcome: Progressing  Goal: Tube feed tolerance  7/21/2024 2117 by Neri Dey,  RN  Outcome: Progressing  7/21/2024 2117 by Neri Dey, RN  Outcome: Progressing  7/21/2024 2116 by Neri Dey, RN  Outcome: Progressing  Goal: BG  mg/dL  7/21/2024 2117 by Neri Dey, RN  Outcome: Progressing  7/21/2024 2117 by Neri Dey, RN  Outcome: Progressing  7/21/2024 2116 by Neri Dey, RN  Outcome: Progressing  Goal: Lab values WNL  7/21/2024 2117 by Neri Dey, RN  Outcome: Progressing  7/21/2024 2117 by Neri Dey, RN  Outcome: Progressing  7/21/2024 2116 by Neri Dey, RN  Outcome: Progressing  Goal: Electrolytes WNL  7/21/2024 2117 by Neri Dey, RN  Outcome: Progressing  7/21/2024 2117 by Neri Dey, RN  Outcome: Progressing  7/21/2024 2116 by Neri Dey, RN  Outcome: Progressing  Goal: Promote healing  7/21/2024 2117 by Neri Dey, RN  Outcome: Progressing  7/21/2024 2117 by Neri Dey, RN  Outcome: Progressing  7/21/2024 2116 by Neri Dey, RN  Outcome: Progressing  Goal: Maintain stable weight  7/21/2024 2117 by Neri Dey, RN  Outcome: Progressing  7/21/2024 2117 by Neri Dey, RN  Outcome: Progressing  7/21/2024 2116 by Neri Dey, RN  Outcome: Progressing     Problem: Fall/Injury  Goal: Not fall by end of shift  7/21/2024 2117 by Neri Dey, RN  Outcome: Progressing  7/21/2024 2117 by Neri Dey, RN  Outcome: Progressing  7/21/2024 2116 by Neri Dey, RN  Outcome: Progressing  Goal: Be free from injury by end of the shift  7/21/2024 2117 by Neri Dey, RN  Outcome: Progressing  7/21/2024 2117 by Neri Dey, RN  Outcome: Progressing  7/21/2024 2116 by Neri Dey, RN  Outcome: Progressing  Goal: Verbalize understanding of personal risk factors for fall in the hospital  7/21/2024 2117 by Neri Dey, RN  Outcome: Progressing  7/21/2024 2117 by Neri Dey RN  Outcome: Progressing  7/21/2024 2116 by Neri Dey, RN  Outcome:  Progressing  Goal: Verbalize understanding of risk factor reduction measures to prevent injury from fall in the home  7/21/2024 2117 by Neri Dey RN  Outcome: Progressing  7/21/2024 2117 by Neri Dey RN  Outcome: Progressing  7/21/2024 2116 by Neri Dey RN  Outcome: Progressing  Goal: Use assistive devices by end of the shift  7/21/2024 2117 by Neri Dey RN  Outcome: Progressing  7/21/2024 2117 by Neri Dey RN  Outcome: Progressing  7/21/2024 2116 by Neri Dey RN  Outcome: Progressing  Goal: Pace activities to prevent fatigue by end of the shift  7/21/2024 2117 by Neri Dey RN  Outcome: Progressing  7/21/2024 2117 by Neri Dey RN  Outcome: Progressing  7/21/2024 2116 by Neri Dey RN  Outcome: Progressing     Problem: Safety - Medical Restraint  Goal: Remains free of injury from restraints (Restraint for Interference with Medical Device)  7/21/2024 2117 by Neri Dey RN  Outcome: Progressing  7/21/2024 2117 by Neri Dey RN  Outcome: Progressing  7/21/2024 2116 by Neri Dey RN  Outcome: Progressing  Goal: Free from restraint(s) (Restraint for Interference with Medical Device)  7/21/2024 2117 by Neri Dey RN  Outcome: Progressing  7/21/2024 2117 by Neri Dey RN  Outcome: Progressing  7/21/2024 2116 by Neri Dey RN  Outcome: Progressing     Problem: Risk for falls  Goal: I will remain free from falls  7/21/2024 2117 by Neri Dey RN  Outcome: Progressing  7/21/2024 2117 by Neri Dey RN  Outcome: Progressing  7/21/2024 2116 by Neri Dey RN  Outcome: Progressing     Problem: Knowledge Deficit  Goal: Patient/family/caregiver demonstrates understanding of disease process, treatment plan, medications, and discharge instructions  7/21/2024 2117 by Neri Dey RN  Outcome: Progressing  7/21/2024 2117 by Neri Dey RN  Outcome: Progressing  7/21/2024 2116 by Neri Dey,  RN  Outcome: Progressing     Problem: Mechanical Ventilation  Goal: Patient Will Maintain Patent Airway  7/21/2024 2117 by Neri Dey RN  Outcome: Progressing  7/21/2024 2117 by Neri Dey RN  Outcome: Progressing  7/21/2024 2116 by Neri Dey RN  Outcome: Progressing  Goal: Oral health is maintained or improved  7/21/2024 2117 by Neri Dey RN  Outcome: Progressing  7/21/2024 2117 by Neri Dey RN  Outcome: Progressing  7/21/2024 2116 by Neri Dey RN  Outcome: Progressing  Goal: ET tube will be managed safely  7/21/2024 2117 by Neri Dey RN  Outcome: Progressing  7/21/2024 2117 by Neri Dey RN  Outcome: Progressing  7/21/2024 2116 by Neri Dey RN  Outcome: Progressing  Goal: Ability to express needs and understand communication  7/21/2024 2117 by Neri Dey RN  Outcome: Progressing  7/21/2024 2117 by Neri Dey RN  Outcome: Progressing  7/21/2024 2116 by Neri Dey RN  Outcome: Progressing  Goal: Mobility/activity is maintained at optimum level for patient  7/21/2024 2117 by Neri Dey RN  Outcome: Progressing  7/21/2024 2117 by Neri Dey RN  Outcome: Progressing  7/21/2024 2116 by Neri Dey RN  Outcome: Progressing     Problem: Pain - Adult  Goal: Verbalizes/displays adequate comfort level or baseline comfort level  7/21/2024 2117 by Neri Dey RN  Outcome: Progressing  7/21/2024 2117 by Neri Dey RN  Outcome: Progressing  7/21/2024 2116 by Neri Dey RN  Outcome: Progressing     Problem: Safety - Adult  Goal: Free from fall injury  7/21/2024 2117 by Neri Dey RN  Outcome: Progressing  7/21/2024 2117 by Neri Dey RN  Outcome: Progressing  7/21/2024 2116 by Neri Dey, RN  Outcome: Progressing     Problem: Discharge Planning  Goal: Discharge to home or other facility with appropriate resources  7/21/2024 2117 by Neri Dey RN  Outcome:  Progressing  7/21/2024 2117 by Neri Dey RN  Outcome: Progressing  7/21/2024 2116 by Neri Dey RN  Outcome: Progressing     Problem: Chronic Conditions and Co-morbidities  Goal: Patient's chronic conditions and co-morbidity symptoms are monitored and maintained or improved  7/21/2024 2117 by Neri Dey RN  Outcome: Progressing  7/21/2024 2117 by Neri Dey RN  Outcome: Progressing  7/21/2024 2116 by Neri Dey RN  Outcome: Progressing     Problem: Skin  Goal: Decreased wound size/increased tissue granulation at next dressing change  7/21/2024 2117 by Neri Dey RN  Outcome: Progressing  7/21/2024 2117 by Neri Dey RN  Outcome: Progressing  7/21/2024 2116 by Neri Dey RN  Outcome: Progressing  Goal: Participates in plan/prevention/treatment measures  7/21/2024 2117 by Neri Dey RN  Outcome: Progressing  7/21/2024 2117 by Neri Dey RN  Outcome: Progressing  7/21/2024 2116 by Neri Dey RN  Outcome: Progressing  Goal: Prevent/manage excess moisture  7/21/2024 2117 by Neri Dey RN  Outcome: Progressing  7/21/2024 2117 by Neri Dey RN  Outcome: Progressing  7/21/2024 2116 by Neri Dey RN  Outcome: Progressing  Goal: Prevent/minimize sheer/friction injuries  7/21/2024 2117 by Neri Dey RN  Outcome: Progressing  7/21/2024 2117 by Neri Dey RN  Outcome: Progressing  7/21/2024 2116 by Neri Dey RN  Outcome: Progressing  Goal: Promote/optimize nutrition  7/21/2024 2117 by Neri Dey RN  Outcome: Progressing  7/21/2024 2117 by Neri Dey RN  Outcome: Progressing  7/21/2024 2116 by Neri Dey RN  Outcome: Progressing  Goal: Promote skin healing  7/21/2024 2117 by Neri Dey RN  Outcome: Progressing  7/21/2024 2117 by Neri Dey RN  Outcome: Progressing  7/21/2024 2116 by Neri Dey, RN  Outcome: Progressing     Problem: Nutrition  Goal: Less than 5 days  NPO/clear liquids  7/21/2024 2117 by Neri Dey RN  Outcome: Progressing  7/21/2024 2117 by Neri Dey RN  Outcome: Progressing  7/21/2024 2116 by Neri Dey RN  Outcome: Progressing  Goal: Oral intake greater than 50%  7/21/2024 2117 by Neri Dey RN  Outcome: Progressing  7/21/2024 2117 by Neri Dey RN  Outcome: Progressing  7/21/2024 2116 by Neri Dey RN  Outcome: Progressing  Goal: Oral intake greater 75%  7/21/2024 2117 by Neri Dey RN  Outcome: Progressing  7/21/2024 2117 by Neri Dey RN  Outcome: Progressing  7/21/2024 2116 by Neri Dey RN  Outcome: Progressing  Goal: Consume prescribed supplement  7/21/2024 2117 by Neri Dey, RN  Outcome: Progressing  7/21/2024 2117 by Neri Dey RN  Outcome: Progressing  7/21/2024 2116 by Neri Dey RN  Outcome: Progressing  Goal: Adequate PO fluid intake  7/21/2024 2117 by Neri Dey RN  Outcome: Progressing  7/21/2024 2117 by Neri Dey RN  Outcome: Progressing  7/21/2024 2116 by Neri Dey RN  Outcome: Progressing  Goal: Nutrition support goals are met within 48 hrs  7/21/2024 2117 by Neri Dey RN  Outcome: Progressing  7/21/2024 2117 by Neri Dey RN  Outcome: Progressing  7/21/2024 2116 by Neri Dey RN  Outcome: Progressing  Goal: Nutrition support is meeting 75% of nutrient needs  7/21/2024 2117 by Neri Dey RN  Outcome: Progressing  7/21/2024 2117 by Neri Dey RN  Outcome: Progressing  7/21/2024 2116 by Neri Dey RN  Outcome: Progressing  Goal: Tube feed tolerance  7/21/2024 2117 by Neri Dey RN  Outcome: Progressing  7/21/2024 2117 by Neri Dey RN  Outcome: Progressing  7/21/2024 2116 by Neri Dey RN  Outcome: Progressing  Goal: BG  mg/dL  7/21/2024 2117 by Neri Dey RN  Outcome: Progressing  7/21/2024 2117 by Neri Dey RN  Outcome: Progressing  7/21/2024 2116 by  Neri Dey, RN  Outcome: Progressing  Goal: Lab values WNL  7/21/2024 2117 by Neri Dey, RN  Outcome: Progressing  7/21/2024 2117 by Neri Dey, RN  Outcome: Progressing  7/21/2024 2116 by Neri Dey, RN  Outcome: Progressing  Goal: Electrolytes WNL  7/21/2024 2117 by Neri Dey, RN  Outcome: Progressing  7/21/2024 2117 by Neri Dey, RN  Outcome: Progressing  7/21/2024 2116 by Neri Dey, RN  Outcome: Progressing  Goal: Promote healing  7/21/2024 2117 by Neri Dey, RN  Outcome: Progressing  7/21/2024 2117 by Neri Dey, RN  Outcome: Progressing  7/21/2024 2116 by Neri Dey, RN  Outcome: Progressing  Goal: Maintain stable weight  7/21/2024 2117 by Neri Dey, RN  Outcome: Progressing  7/21/2024 2117 by Neri Dey, RN  Outcome: Progressing  7/21/2024 2116 by Neri Dey, RN  Outcome: Progressing     Problem: Fall/Injury  Goal: Not fall by end of shift  7/21/2024 2117 by Neri Dey, RN  Outcome: Progressing  7/21/2024 2117 by Neri Dey, RN  Outcome: Progressing  7/21/2024 2116 by Neri Dey, RN  Outcome: Progressing  Goal: Be free from injury by end of the shift  7/21/2024 2117 by Neri Dey, RN  Outcome: Progressing  7/21/2024 2117 by Neri Dey, RN  Outcome: Progressing  7/21/2024 2116 by Neri Dey, RN  Outcome: Progressing  Goal: Verbalize understanding of personal risk factors for fall in the hospital  7/21/2024 2117 by Neri Dey, RN  Outcome: Progressing  7/21/2024 2117 by Neri Dey, RN  Outcome: Progressing  7/21/2024 2116 by Neri Dey, RN  Outcome: Progressing  Goal: Verbalize understanding of risk factor reduction measures to prevent injury from fall in the home  7/21/2024 2117 by Neri Dey, RN  Outcome: Progressing  7/21/2024 2117 by Neri Dey RN  Outcome: Progressing  7/21/2024 2116 by Neri Dey RN  Outcome: Progressing  Goal: Use assistive devices  by end of the shift  7/21/2024 2117 by Neri Dey RN  Outcome: Progressing  7/21/2024 2117 by Neri Dey RN  Outcome: Progressing  7/21/2024 2116 by Neri Dey RN  Outcome: Progressing  Goal: Pace activities to prevent fatigue by end of the shift  7/21/2024 2117 by Neri Dey RN  Outcome: Progressing  7/21/2024 2117 by Neri Dey RN  Outcome: Progressing  7/21/2024 2116 by Neri Dey RN  Outcome: Progressing     Problem: Safety - Medical Restraint  Goal: Remains free of injury from restraints (Restraint for Interference with Medical Device)  7/21/2024 2117 by Neri Dey RN  Outcome: Progressing  7/21/2024 2117 by Neri Dey RN  Outcome: Progressing  7/21/2024 2116 by Neri Dey RN  Outcome: Progressing  Goal: Free from restraint(s) (Restraint for Interference with Medical Device)  7/21/2024 2117 by Neri Dey RN  Outcome: Progressing  7/21/2024 2117 by Neri Dey RN  Outcome: Progressing  7/21/2024 2116 by Neri Dey RN  Outcome: Progressing     Problem: Risk for falls  Goal: I will remain free from falls  7/21/2024 2117 by Neri Dey RN  Outcome: Progressing  7/21/2024 2117 by Neri Dey RN  Outcome: Progressing  7/21/2024 2116 by Neri Dey RN  Outcome: Progressing   The patient's goals for the shift include comfort     The clinical goals for the shift include safety    Over the shift, the patient did not make progress toward the following goals. Barriers to progression include none. Recommendations to address these barriers include none.

## 2024-07-22 NOTE — PROGRESS NOTES
07/22/24 1436   Physical Activity   On average, how many days per week do you engage in moderate to strenuous exercise (like a brisk walk)? 0 days   On average, how many minutes do you engage in exercise at this level? 0 min   Financial Resource Strain   How hard is it for you to pay for the very basics like food, housing, medical care, and heating? Not very   Housing Stability   In the last 12 months, was there a time when you were not able to pay the mortgage or rent on time? N   In the past 12 months, how many times have you moved where you were living? 0   At any time in the past 12 months, were you homeless or living in a shelter (including now)? N   Transportation Needs   In the past 12 months, has lack of transportation kept you from medical appointments or from getting medications? no   In the past 12 months, has lack of transportation kept you from meetings, work, or from getting things needed for daily living? No   Food Insecurity   Within the past 12 months, you worried that your food would run out before you got the money to buy more. Never true   Within the past 12 months, the food you bought just didn't last and you didn't have money to get more. Never true   Stress   Do you feel stress - tense, restless, nervous, or anxious, or unable to sleep at night because your mind is troubled all the time - these days? Pt Unable   Social Connections   In a typical week, how many times do you talk on the phone with family, friends, or neighbors? More than 3   How often do you get together with friends or relatives? More than 3   How often do you attend Jew or Hinduism services? Never   Do you belong to any clubs or organizations such as Jew groups, unions, fraternal or athletic groups, or school groups? No   How often do you attend meetings of the clubs or organizations you belong to? Never   Are you , , , , never , or living with a partner?    Intimate  Partner Violence   Within the last year, have you been afraid of your partner or ex-partner? Pt Unable   Within the last year, have you been humiliated or emotionally abused in other ways by your partner or ex-partner? Pt Unable   Within the last year, have you been kicked, hit, slapped, or otherwise physically hurt by your partner or ex-partner? Pt Unable   Within the last year, have you been raped or forced to have any kind of sexual activity by your partner or ex-partner? Pt Unable   Alcohol Use   Q2: How many drinks containing alcohol do you have on a typical day when you are drinking? None   Q3: How often do you have six or more drinks on one occasion? Never   Utilities   In the past 12 months has the electric, gas, oil, or water company threatened to shut off services in your home? No   Health Literacy   How often do you need to have someone help you when you read instructions, pamphlets, or other written material from your doctor or pharmacy? Patient unable to respond

## 2024-07-22 NOTE — NURSING NOTE
Patient with Rt internal jugular TLC, dressing loose, all blue caps changed, blood return verified, distal and proximal in use, medial flushed, clamped and curos cap applied. Dressing change done using sterile technique, sutures intact, insertion site with no redness drainage or swelling. Patient tolerated well.

## 2024-07-22 NOTE — CARE PLAN
Problem: Knowledge Deficit  Goal: Patient/family/caregiver demonstrates understanding of disease process, treatment plan, medications, and discharge instructions  Outcome: Progressing     Problem: Mechanical Ventilation  Goal: Patient Will Maintain Patent Airway  Outcome: Progressing  Goal: Oral health is maintained or improved  Outcome: Progressing  Goal: ET tube will be managed safely  Outcome: Progressing  Goal: Ability to express needs and understand communication  Outcome: Progressing  Goal: Mobility/activity is maintained at optimum level for patient  Outcome: Progressing     Problem: Pain - Adult  Goal: Verbalizes/displays adequate comfort level or baseline comfort level  Outcome: Progressing     Problem: Safety - Adult  Goal: Free from fall injury  Outcome: Progressing     Problem: Discharge Planning  Goal: Discharge to home or other facility with appropriate resources  Outcome: Progressing     Problem: Chronic Conditions and Co-morbidities  Goal: Patient's chronic conditions and co-morbidity symptoms are monitored and maintained or improved  Outcome: Progressing     Problem: Skin  Goal: Decreased wound size/increased tissue granulation at next dressing change  Outcome: Progressing  Goal: Participates in plan/prevention/treatment measures  Outcome: Progressing  Goal: Prevent/manage excess moisture  Outcome: Progressing  Goal: Prevent/minimize sheer/friction injuries  Outcome: Progressing  Goal: Promote/optimize nutrition  Outcome: Progressing  Goal: Promote skin healing  Outcome: Progressing     Problem: Nutrition  Goal: Less than 5 days NPO/clear liquids  Outcome: Progressing  Goal: Oral intake greater than 50%  Outcome: Progressing  Goal: Oral intake greater 75%  Outcome: Progressing  Goal: Consume prescribed supplement  Outcome: Progressing  Goal: Adequate PO fluid intake  Outcome: Progressing  Goal: Nutrition support goals are met within 48 hrs  Outcome: Progressing  Goal: Nutrition support is meeting  75% of nutrient needs  Outcome: Progressing  Goal: Tube feed tolerance  Outcome: Progressing  Goal: BG  mg/dL  Outcome: Progressing  Goal: Lab values WNL  Outcome: Progressing  Goal: Electrolytes WNL  Outcome: Progressing  Goal: Promote healing  Outcome: Progressing  Goal: Maintain stable weight  Outcome: Progressing     Problem: Fall/Injury  Goal: Not fall by end of shift  Outcome: Progressing  Goal: Be free from injury by end of the shift  Outcome: Progressing  Goal: Verbalize understanding of personal risk factors for fall in the hospital  Outcome: Progressing  Goal: Verbalize understanding of risk factor reduction measures to prevent injury from fall in the home  Outcome: Progressing  Goal: Use assistive devices by end of the shift  Outcome: Progressing  Goal: Pace activities to prevent fatigue by end of the shift  Outcome: Progressing     Problem: Safety - Medical Restraint  Goal: Remains free of injury from restraints (Restraint for Interference with Medical Device)  Outcome: Progressing  Goal: Free from restraint(s) (Restraint for Interference with Medical Device)  Outcome: Progressing     Problem: Risk for falls  Goal: I will remain free from falls  Outcome: Progressing   The patient's goals for the shift include  comfort    The clinical goals for the shift include safety    Over the shift, the patient did not make progress toward the following goals. Barriers to progression include none. Recommendations to address these barriers include none.

## 2024-07-22 NOTE — PROGRESS NOTES
Speech-Language Pathology    Speech-Language Pathology Clinical Swallow Evaluation    Patient Name: Nilam Powell  MRN: [id number]  : 1950  Today's Date: 24  Start time: Start Time: 1405  Stop time: Stop Time: 1425  Time calculation (min) : Time Calculation (min): 20 min      ASSESSMENT  Impressions:  Mild oral phase with confusion,  no s/s aspiration or  suspected pharyngeal phase dysphagia based on clinical swallow evaluation. Pt would benefit from skilled ST to minimize aspiration risk and ensure ongoing safety with the least restrictive diet.  Prognosis: Good    PLAN  Recommendations:  MBSS recommended: No; no pharyngeal dysphagia suspected.  Solid consistency: Easy to chew  Liquid consistency: Thin (IDDSI 0)  Medication administration: Crushed, in puree  Compensatory swallow strategies: - Upright positioning for all PO intake  - Slow rate of intake  - Small bites  - Small sips  - Single sips  - One bite/sip at a time  - Alternate bites and sips  - Total assist for feeding    Recommended frequency/duration:  Skilled SLP services recommended: Yes  Frequency: 3x/week  Duration: 2 weeks  Discharge recommendation: Unable to determine at this time; please see follow-up notes for DC recommendation.  Treatment/Interventions: Assess diet tolerance, Patient/family education, Bolus trials  Strengths: Motivation and Family/caregiver support  Barriers to participation in tx: Cognition    Goals (start date 24. Anticipated time frame for goal attainment: ): 2 weeks  Pt will consume prescribed diet (current diet is easy to chew) without overt s/sx aspiration/penetration in 95% of observed trials.  Pt will consume trials of upgraded textures without overt s/sx aspiration in order for consideration of diet texture upgrade.  Pt will demonstrate follow-through of trained compensatory strategies during a meal/snack with 90% acc independently.   Status: Goal initiated this date   Progress this date: CSE completed        SUBJECTIVE    PMHx relevant to rehab: No pertinent past medical history.    Chief complaint: Pt was admitted on 7/19/24 found unresponsive at home, EMS was called and arrived 10-20 min later. Upon their arrival, patient pulseless, BLS and ACLS was initiated. ROSC after 2 shock and 1 epi. Igel airway was placed,   Relevant imaging results:  CXR 7/20/24  IMPRESSION:  1. Satisfactory position endotracheal tube and right internal jugular  central venous catheter.  2. Bilateral patchy alveolar opacities consistent with pneumonia or  pulmonary edema; there has been improvement.  General Visit Information:     Patient Class: Inpatient  Living Environment: Home     Reason for Referral: assess swallow d/t AMS/intubation (7/19/24-7/21/24)  Prior to Session Communication: Bedside nurse    RN cleared pt to participate in session and reported pt confused    Pt  reported pt has no h/o dysphagia, pt  is a poor historian @ this time    Date of Onset: 07/19/24  Date of Order: 07/21/24  BaseLine Diet: regular and thin liquids  Current Diet : NPO    Pain Assessment  Pain Assessment: 0-10  0-10 (Numeric) Pain Score: 0 - No pain    Pt was alert, cooperative, and pleasantly confused for session.  Orientation: Oriented to hospital but not name of facility  Ability to follow functional commands: WFL  Nutritional status: Appears well-nourished/no concerns    Respiratory status: Supplemental oxygen via NC  Baseline Vocal Quality: Dysphonic s/p extubation  Volitional Cough: Strong  Volitional Swallow: Within Functional Limits  Patient positioning: Upright in bed, intermittently leaning to left, repositioned      OBJECTIVE  Clinical swallow evaluation completed and consisted of interview, oral motor assessment, and PO trials (ice chips x 2, 2 tsp,4 oz thin liquids via straw, 2 oz applesauce, 1 roxane cracker).  ORAL PHASE: Natural dentition in fair condition. Oral mucosa were pink, moist, and free of obvious lesions. Lingual  strength and ROM were WFL. Labial strength/ROM were WFL. Labial seal was adequate. Mastication of regular solids was slow/prolonged, pt talking during mastication,  A/P transit and oral clearance were adequate.  PHARYNGEAL PHASE: Laryngeal elevation was visualized or palpated with all trials, however adequacy of hyolaryngeal elevation/excursion cannot be determined at bedside. No immediate or delayed s/sx aspiration/penetration were observed with any consistencies.    Was 3oz challenge administered: No, deferred due to safety concerns.      Treatment/Education:  Results and recommendations were relayed to: Patient, Family, and Bedside nurse  Education provided: Yes   Learner: Patient and Significant other   Barriers to learning: Cognitive limitations barrier   Method of teaching: Verbal   Topic: role of ST, results of assessment, recommended diet modifications, and recommended safe swallow strategies   Outcome of teaching: Caregiver demonstrated good understanding and Pt demonstrated partial understanding  Treatment provided: No  Next Treatment Priority: diet haim, trials, strat,educ

## 2024-07-22 NOTE — PROGRESS NOTES
I have seen the patient either independently or with an associated resident physician or advanced practice provider    Overnight Events: No acute events overnight. Patient was extubated without difficulty yesterday. Encephalopathic without focal deficit this morning.    Physical Exam  Vitals reviewed.   Constitutional:       Appearance: She is not ill-appearing.   HENT:      Mouth/Throat:      Mouth: Mucous membranes are moist.   Eyes:      Pupils: Pupils are equal, round, and reactive to light.   Cardiovascular:      Rate and Rhythm: Normal rate and regular rhythm.   Pulmonary:      Effort: Pulmonary effort is normal. No respiratory distress.   Abdominal:      General: There is no distension.      Tenderness: There is no abdominal tenderness.   Musculoskeletal:      Cervical back: Normal range of motion.      Comments: Moves upper and lower extremities independently in coordinated fashion.   Skin:     General: Skin is warm and dry.   Neurological:      Mental Status: She is alert.      Comments: Confused, CAM positive. No focal weakness or facial droop.         Neuro: Acute metabolic encephalopathy. MRI Brain negative for acute infarct or intracranial mass effect. Delirium precautions. Q4 neuro checks continued. SLP eval pending.  Cardiac: VF cardiac arrest with pre-hospital ROSC. Ischemic work/up with cardiology, heart cath tomorrow. No longer requiring levophed. Continue home ASA, statin.  Pulmonary: Acute respiratory failure with hypoxia and hypercapnia improving. Minimal requirement for NC today, continue IS.  Gastrointestinal: NPO at this time pending SLP evaluation, regardless will keep NPO at midnight for cath lab tomorrow.  Renal: Acute kidney injury improving. Continue strict I/Os, maintain gutierrez with plan to remove tomorrow.  Endocrine: No history if IDDM. Continue SSI.  Hematology: Mild anemia, Hgb stable. Heparin gtt for acs  Infectious Disease: Leukocytosis improving, continue antibiotics for five  days  Musculoskeletal: No acute issues.    Lines/Tubes/Drains: PIV x2 , RIJ triple lumen CVC, gutierrez      Prophylaxis: Heparin gtt for acs  Med to Discontinue: Protonix discontinued    Disposition: ICU    ABCDEF Checklist  Analgesia: Reviewed  Breathing: Not intubated  Choice of analgesia/sedation: Analgesic and sedative agents adjusted per clinical context.   Delirium assessed by CAM, will avoid exacerbating factors   Early mobility and exercise: Physical and occupational therapy engaged   Family: Plan of care, overall trajectory of patient shared with family. Questions elicited and answered as appropriate.       Due to the high probability of life threatening clinical decompensation, the patient required critical care time evaluating and managing this patient.  Critical care time included obtaining a history, examining the patient, ordering and reviewing studies, discussing, developing, and implementing a management plan, evaluating the patient's response to treatment, and discussion with other care team providers. I saw and evaluated the patient myself.  Critical care time was performed exclusive of billable procedures.    Critical care time: 40 minutes

## 2024-07-22 NOTE — PROGRESS NOTES
TCC spoke to patient's spouse, See, on the phone. Assessment complete. Patient lives with her spouse and their 4 children. Patient typically independent. Patient does not use an assistive device. Patient does not drive, her spouse does the transporting. Patient can assist with shopping, cooking and cleaning, but her spouse does most of the chores. Patient follows Dr. Afshin Weinberg at [facility]. Patient fills prescriptions at [facility] in [place]. At this time there is not a safe discharge plan in place. Will follow.      07/22/24 5643   Discharge Planning   Living Arrangements Spouse/significant other;Children   Support Systems Children;Spouse/significant other;Family members   Type of Residence Private residence   Number of Stairs to Enter Residence 3   Home or Post Acute Services Other (Comment)  (TBD)   Expected Discharge Disposition Other  (TBD)   Does the patient need discharge transport arranged? No   Financial Resource Strain   How hard is it for you to pay for the very basics like food, housing, medical care, and heating? Not very   Housing Stability   In the last 12 months, was there a time when you were not able to pay the mortgage or rent on time? N   In the past 12 months, how many times have you moved where you were living? 0   At any time in the past 12 months, were you homeless or living in a shelter (including now)? N   Transportation Needs   In the past 12 months, has lack of transportation kept you from medical appointments or from getting medications? no   In the past 12 months, has lack of transportation kept you from meetings, work, or from getting things needed for daily living? No

## 2024-07-22 NOTE — PROGRESS NOTES
Physical Therapy    Physical Therapy Evaluation & Treatment    Patient Name: Nilam Powell  MRN: [id number]  Today's Date: 7/22/2024   Time Calculation  Start Time: 1331  Stop Time: 1354  Time Calculation (min): 23 min    Assessment/Plan   PT Assessment  PT Assessment Results: Decreased strength, Decreased range of motion, Decreased endurance, Impaired balance, Decreased mobility, Decreased coordination, Decreased cognition, Impaired judgement, Decreased safety awareness, Pain, Obesity  Rehab Prognosis: Good  Barriers to Discharge: medical status  Evaluation/Treatment Tolerance: Patient tolerated treatment well, Patient limited by fatigue  Medical Staff Made Aware: Yes  Strengths: Support and attitude of living partners, Support of extended family/friends, Premorbid level of function  Barriers to Participation: Ability to acquire knowledge, Comorbidities  End of Session Communication: Bedside nurse  Assessment Comment: Pt demonstrates impaired functional mobility from baseline level; pt with pain, BLE weakness, impaired balance, decreased overall activity tolerance, and impaired safety awareness; currently requiring max assist x 2 for bed mobility/transfers, pt is a high falls risk; would benefit from mod intensity therapy needs upon d/c.  End of Session Patient Position: Bed, 4 rail up, Alarm on   IP OR SWING BED PT PLAN  Inpatient or Swing Bed: Inpatient  PT Plan  Treatment/Interventions: Bed mobility, Transfer training, Gait training, Stair training, Balance training, Neuromuscular re-education, Strengthening, Endurance training, Range of motion, Therapeutic exercise, Therapeutic activity  PT Plan: Ongoing PT  PT Frequency: 6 times per week  PT Discharge Recommendations: Moderate intensity level of continued care  Equipment Recommended upon Discharge: Wheeled walker  PT Recommended Transfer Status: Assist x2, Assistive device  PT - OK to Discharge: Yes    Subjective     General Visit Information:  General  Reason  for Referral: impaired functional mobility; Pt admitted from home after being found unresponsive. Upon EMS arrival pt was found to be pulseless, CPR was performed. Pt was found to be in ventricular fibrillation, underwent cardioversion. Pt was intubated 7/19 extubated 7/21  Referred By: Demetrice Mendoza PA-C  Past Medical History Relevant to Rehab: HTN, DLP, gout, uterine fibroid, Colonoscopy, hysterectomy  Family/Caregiver Present: Yes  Caregiver Feedback: spouse present at bedside  Co-Treatment: OT  Co-Treatment Reason: initial evaluation of medically complex ICU pt requiring two-person skilled assist for safe pt handling  Prior to Session Communication: Bedside nurse  Patient Position Received: Bed, 4 rail up, Alarm on  Preferred Learning Style: verbal  General Comment: pt cleared for therapy via RN, received in supine, NAD, confused but agreeable to participate. (+) 3L O2 via NC, telemetry, Orellana, IV  Home Living:  Home Living  Type of Home: House  Lives With: Spouse, Adult children (spouse and four adult children)  Home Adaptive Equipment: Walker rolling or standard, Cane  Home Layout: Multi-level, Laundry in basement, Able to live on main level with bedroom/bathroom  Home Access: Stairs to enter with rails  Entrance Stairs-Rails: None  Entrance Stairs-Number of Steps: 1+2  Bathroom Shower/Tub: Tub/shower unit  Bathroom Equipment: Grab bars in shower, Shower chair without back  Prior Level of Function:  Prior Function Per Pt/Caregiver Report  Level of [place]: Independent with ADLs and functional transfers, Independent with homemaking with ambulation  Receives Help From: Family  ADL Assistance: Independent  Homemaking Assistance: Independent (shares with spouse)  Driving/Transportation: Family/Friend  Ambulatory Assistance: Independent  Hand Dominance: Left  Prior Function Comments: Pts spouse present and able to assist with intake information, reports pt was indep PTA, denied h/o  falls  Precautions:  Precautions  Hearing/Visual Limitations: Glasses  Medical Precautions: Fall precautions, Oxygen therapy device and L/min (4L O2 via NC)  Vital Signs:  Vital Signs  Heart Rate: 70  SpO2: 94 %  BP: 143/85  MAP (mmHg): 102    Objective   Pain:  Pain Assessment  Pain Assessment: FLACC (Face, Legs, Activity, Cry, Consolability)  0-10 (Numeric) Pain Score: 3  Pain Type: Acute pain  Pain Location: Generalized (with movement)  Pain Interventions: Repositioned  Cognition:  Cognition  Overall Cognitive Status: Impaired  Orientation Level: Disoriented to place, Disoriented to time, Disoriented to situation  Memory:  (impaired)  Safety/Judgement:  (impaired)  Insight: Moderate  Impulsive: Mildly    General Assessments:  General Observation  General Observation: lethargic but easily arousable     Activity Tolerance  Endurance: Decreased tolerance for upright activites  Activity Tolerance Comments: poor    Sensation  Light Touch: No apparent deficits  Sensation Comment: pt denies paresthesias    Strength  Strength Comments: BLE > 3-/5  Strength  Strength Comments: BLE > 3-/5    Coordination  Movements are Fluid and Coordinated: No  Coordination Comment: decreased rate/accuracy of movements    Postural Control  Postural Control: Impaired  Posture Comment: forward head, obesity    Static Sitting Balance  Static Sitting-Balance Support: Bilateral upper extremity supported  Static Sitting-Level of Assistance: Minimum assistance  Static Sitting-Comment/Number of Minutes: pt sat on EOB ~8 minutes requiring min to mod assist x 1 to maintain static seated balance; limited ability to follow commands to straighten hips or use BUE for support; time sitting limited d/t pt c/o fatigue    Static Standing Balance  Static Standing-Balance Support:  (deferred d/t pt c/o fatigue and safety concerns, pt mildly impulsive)  Functional Assessments:     Bed Mobility  Bed Mobility: Yes  Bed Mobility 1  Bed Mobility 1: Supine to  sitting  Level of Assistance 1: Maximum assistance, +2  Bed Mobility Comments 1: assist for trunk elevation and advancement of BLE off EOB; assist to scoot hips towards EOB  Bed Mobility 2  Bed Mobility  2: Sitting to supine  Level of Assistance 2: Maximum assistance, +2  Bed Mobility Comments 2: assist for controlled trunk descent and lifting BLE onto bed; dep x 2 boost towards HOB    Transfers  Transfer: No    Ambulation/Gait Training  Ambulation/Gait Training Performed: No    Stairs  Stairs: No    Extremity/Trunk Assessments:  RLE   RLE :  (AAROM limited at end ranges)  LLE   LLE :  (AAROM limited at end ranges)  Treatments:  Balance/Neuromuscular Re-Education  Balance/Neuromuscular Re-Education Activity Performed: Yes  Balance/Neuromuscular Re-Education Activity 1: pt sat on EOB ~8 minutes requiring min to mod assist x 1 to maintain static seated balance; limited ability to follow commands to straighten hips or use BUE for support; time sitting limited d/t pt c/o fatigue    Bed Mobility  Bed Mobility: Yes  Bed Mobility 1  Bed Mobility 1: Supine to sitting  Level of Assistance 1: Maximum assistance, +2  Bed Mobility Comments 1: assist for trunk elevation and advancement of BLE off EOB; assist to scoot hips towards EOB  Bed Mobility 2  Bed Mobility  2: Sitting to supine  Level of Assistance 2: Maximum assistance, +2  Bed Mobility Comments 2: assist for controlled trunk descent and lifting BLE onto bed; dep x 2 boost towards HOB  Outcome Measures:  [facility] Basic Mobility  Turning from your back to your side while in a flat bed without using bedrails: Total  Moving from lying on your back to sitting on the side of a flat bed without using bedrails: Total  Moving to and from bed to chair (including a wheelchair): Total  Standing up from a chair using your arms (e.g. wheelchair or bedside chair): Total  To walk in hospital room: Total  Climbing 3-5 steps with railing: Total  Basic Mobility - Total Score: 6    Encounter  Problems       Encounter Problems (Active)       Balance       STG - Maintains static standing balance with upper extremity support and min assist x 1. (Not Progressing)       Start:  07/22/24    Expected End:  08/22/24       INTERVENTIONS:  1. Practice standing with minimal support.  2. Educate patient about standing tolerance.  3. Educate patient about independence with gait, transfers, and ADL's.  4. Educate patient about use of assistive device.  5. Educate patient about self-directed care.         STG - Maintains static sitting balance with upper extremity support and supervision for safety. (Progressing)       Start:  07/22/24    Expected End:  08/22/24       INTERVENTIONS:  1. Practice sitting on the edge of a bed/mat with minimal support.  2. Educate patient about maintining total hip precautions while maintaining balance.  3. Educate patient about pressure relief.  4. Educate patient about use of assistive device.            Mobility       STG - Patient will ambulate 50' with use of rolling walker and min assist x 1. (Not Progressing)       Start:  07/22/24    Expected End:  08/22/24            STG - Patient will ambulate up and down a curb/step with min assist x 1. (Not Progressing)       Start:  07/22/24    Expected End:  08/22/24               PT Transfers       STG - Patient to transfer to and from sit to supine with supervision for safety. (Progressing)       Start:  07/22/24    Expected End:  08/22/24            STG - Patient will transfer sit to and from stand with use of rolling walker and min assist x 1. (Not Progressing)       Start:  07/22/24    Expected End:  08/22/24               Pain - Adult          Safety       STG - Patient uses call light consistently to request assistance with transfers (Progressing)       Start:  07/22/24    Expected End:  08/22/24                   Education Documentation  Mobility Training, taught by Donya Rabago, PT at 7/22/2024  3:02 PM.  Learner:  Patient  Readiness: Acceptance  Method: Explanation, Demonstration  Response: Needs Reinforcement    Education Comments  No comments found.

## 2024-07-22 NOTE — PROGRESS NOTES
Speech-Language Pathology                 Therapy Communication Note    Patient Name: Nilam Powell  MRN: [id number]  Today's Date: 7/22/2024     Discipline: Speech Language Pathology    Missed Visit Reason:  PT/OT currently evaluating pt, will attempt later when pt available    Missed Time: Attempt    Comment:

## 2024-07-22 NOTE — CONSULTS
Inpatient consult to Cardiology  Consult performed by: JONG Trevino-CNP  Consult ordered by: Juan Byrd MD  Reason for consult: VF        History Of Present Illness:    Nilam Powell is a 73 y.o. female presenting with VF arrest.  History obtained from the  who stated patient was in her normal state of health he briefly left the room her gas being thought it was from a another room ultimately upon arrival back to his wife she was in a chair gasping for air then became pulseless.  He called EMS on arrival, pulseless, started CPR, [place] with VF, IO placed, epi and defibrillated x2, ROSC.  The emergency room patient was intubated, given amiodarone, transient hypotension Levophed and fluids.  Initial troponins were 18, 78, 47, potassium was low and supplemented, acidosis started on antibiotics.  Reviewed no prior cardiac history.  Extubated yesterday, still has some degree of encephalopathy, neurology involved, MRI: Examination is degraded by varying degrees of motion artifact. No  evidence of acute infarct or intracranial mass effect. Probable  mild-to-moderate chronic small vessel ischemic disease.  Initial preliminary echocardiogram with preserved left ventricular function, normal creatinine though with continued hypokalemia, being treated.  Electrophysiology to evaluate in terms of medication and possible device therapy      Last Recorded Vitals:  Vitals:    07/22/24 0900 07/22/24 1000 07/22/24 1100 07/22/24 1327   BP: 140/86   143/85   BP Location:       Patient Position:       Pulse: 75 62 63 68   Resp: 26 20 22    Temp:   36.6 °C (97.9 °F)    TempSrc:   Oral    SpO2: 94% 96% 93% 91%   Weight:       Height:           Last Labs:  CBC - 7/22/2024:  4:38 AM  16.0 10.1 164    31.6      CMP - 7/22/2024:  4:38 AM  8.5 6.8 101 --- 0.5   2.8 3.2 91 122      PTT - 7/22/2024:  4:38 AM  1.0   11.0 48.1     Hemoglobin A1C   Date/Time Value Ref Range Status   06/29/2023 09:52 AM 5.7 (H) 4.3 - 5.6 % Final  "    Comment:     American Diabetes Association guidelines indicate that patients with HgbA1c in the range 5.7-6.4% are at increased risk for development of diabetes, and intervention by lifestyle modification may be beneficial. HgbA1c greater or equal to 6.5% is considered diagnostic of diabetes.   12/03/2021 02:00 PM 6.0 (H) 4.3 - 5.6 % Final     Comment:     American Diabetes Association guidelines indicate that patients with HgbA1c in   the range 5.7-6.4% are at increased risk for development of diabetes, and   intervention by lifestyle modification may be beneficial. HgbA1c greater or   equal to 6.5% is considered diagnostic of diabetes.      Last I/O:  I/O last 3 completed shifts:  In: 1456.3 (14.9 mL/kg) [I.V.:256.3 (2.6 mL/kg); NG/GT:50; IV Piggyback:1150]  Out: 2180 (22.3 mL/kg) [Urine:2180 (0.6 mL/kg/hr)]  Weight: 97.8 kg     Past Cardiology Tests (Last 3 Years):  EKG:  ECG 12 lead 07/19/2024      ECG 12 lead 07/19/2024    Echo:  No results found for this or any previous visit from the past 1095 days.    Ejection Fractions:  No results found for: \"EF\"  Cath:  No results found for this or any previous visit from the past 1095 days.    Stress Test:  No results found for this or any previous visit from the past 1095 days.    Cardiac Imaging:  No results found for this or any previous visit from the past 1095 days.      Past Medical History:  She has no past medical history on file.    Past Surgical History:  She has no past surgical history on file.      Social History:  She reports that she has never smoked. She has never been exposed to tobacco smoke. She has never used smokeless tobacco. No history on file for alcohol use and drug use.    Family History:  No family history on file.     Allergies:  Patient has no known allergies.    Inpatient Medications:  Scheduled medications   Medication Dose Route Frequency    aspirin  81 mg oral Daily    atorvastatin  10 mg oral Nightly    insulin lispro  0-5 Units " subcutaneous q4h    metoprolol  5 mg intravenous q6h    oxygen   inhalation Continuous - Inhalation    oxygen   inhalation Continuous - Inhalation    piperacillin-tazobactam  4.5 g intravenous q6h     PRN medications   Medication    bisacodyl    dextrose    dextrose    glucagon    glucagon    heparin (porcine)     Continuous Medications   Medication Dose Last Rate    heparin  0-4,000 Units/hr 800 Units/hr (07/22/24 1235)    norepinephrine  0-0.2 mcg/kg/min Stopped (07/20/24 1040)     Outpatient Medications:  Current Outpatient Medications   Medication Instructions    allopurinol (ZYLOPRIM) 300 mg, oral, Daily RT    amLODIPine (NORVASC) 10 mg, oral, Daily RT    atorvastatin (LIPITOR) 10 mg, oral, Daily RT       Physical Exam:  Gen: A+O x2 to self and family , no acute distress, confused but calm   HEENT: Normocephalic/atraumatic pupils equal react light  Neck: No JVD, upstrokes and volumes nl, no bruits   Lung: diminished bases anteriorly   CV:  nl sounding S1, S2, + murmur, PMI nondisplaced  Abdomen: soft, non tender, + BS x 4   Extremities: warm to touch, palpable pulses bilaterally, no edema   Neuro: as above confused      Assessment/Plan   73-year-old female presents with cardiac arrest defibrillated w/ROSC, initially intubated with encephalopathy, extubated 7/21 with improvement in mental status though not back to baseline    Would work to maintain potassium 4.0 and above  Continue with beta-blocker transition to oral if able  Anatomy to be evaluated tomorrow  If no culprit lesion would recommend ICD for secondary prevention, would repeat EKG to reassess QRS for biventricular indication    D/w Dr. Goldman will await catheterization results  Code Status:  Full Code    I spent 45 minutes in the professional and overall care of this patient.        Grace Russ, APRN-CNP

## 2024-07-23 LAB
ANION GAP SERPL CALC-SCNC: 12 MMOL/L
APTT PPP: 28.5 SECONDS (ref 22–32.5)
APTT PPP: 48.4 SECONDS (ref 22–32.5)
BACTERIA BLD CULT: NORMAL
BACTERIA BLD CULT: NORMAL
BASOPHILS # BLD AUTO: 0.06 X10*3/UL (ref 0–0.1)
BASOPHILS NFR BLD AUTO: 0.4 %
BUN SERPL-MCNC: 15 MG/DL (ref 8–25)
CALCIUM SERPL-MCNC: 8.6 MG/DL (ref 8.5–10.4)
CHLORIDE SERPL-SCNC: 105 MMOL/L (ref 97–107)
CO2 SERPL-SCNC: 26 MMOL/L (ref 24–31)
CREAT SERPL-MCNC: 1.2 MG/DL (ref 0.4–1.6)
EGFRCR SERPLBLD CKD-EPI 2021: 48 ML/MIN/1.73M*2
EOSINOPHIL # BLD AUTO: 0.08 X10*3/UL (ref 0–0.4)
EOSINOPHIL NFR BLD AUTO: 0.5 %
ERYTHROCYTE [DISTWIDTH] IN BLOOD BY AUTOMATED COUNT: 15.3 % (ref 11.5–14.5)
GLUCOSE BLD MANUAL STRIP-MCNC: 83 MG/DL (ref 74–99)
GLUCOSE BLD MANUAL STRIP-MCNC: 88 MG/DL (ref 74–99)
GLUCOSE BLD MANUAL STRIP-MCNC: 96 MG/DL (ref 74–99)
GLUCOSE BLD MANUAL STRIP-MCNC: 99 MG/DL (ref 74–99)
GLUCOSE SERPL-MCNC: 88 MG/DL (ref 65–99)
HCT VFR BLD AUTO: 33.5 % (ref 36–46)
HGB BLD-MCNC: 10.5 G/DL (ref 12–16)
IMM GRANULOCYTES # BLD AUTO: 0.17 X10*3/UL (ref 0–0.5)
IMM GRANULOCYTES NFR BLD AUTO: 1.1 % (ref 0–0.9)
LYMPHOCYTES # BLD AUTO: 1.15 X10*3/UL (ref 0.8–3)
LYMPHOCYTES NFR BLD AUTO: 7.4 %
MAGNESIUM SERPL-MCNC: 1.9 MG/DL (ref 1.6–3.1)
MCH RBC QN AUTO: 27.3 PG (ref 26–34)
MCHC RBC AUTO-ENTMCNC: 31.3 G/DL (ref 32–36)
MCV RBC AUTO: 87 FL (ref 80–100)
MONOCYTES # BLD AUTO: 0.86 X10*3/UL (ref 0.05–0.8)
MONOCYTES NFR BLD AUTO: 5.5 %
NEUTROPHILS # BLD AUTO: 13.28 X10*3/UL (ref 1.6–5.5)
NEUTROPHILS NFR BLD AUTO: 85.1 %
NRBC BLD-RTO: 0 /100 WBCS (ref 0–0)
PHOSPHATE SERPL-MCNC: 3.3 MG/DL (ref 2.5–4.5)
PLATELET # BLD AUTO: 180 X10*3/UL (ref 150–450)
POTASSIUM SERPL-SCNC: 3.7 MMOL/L (ref 3.4–5.1)
RBC # BLD AUTO: 3.85 X10*6/UL (ref 4–5.2)
SODIUM SERPL-SCNC: 143 MMOL/L (ref 133–145)
WBC # BLD AUTO: 15.6 X10*3/UL (ref 4.4–11.3)

## 2024-07-23 PROCEDURE — 99291 CRITICAL CARE FIRST HOUR: CPT | Performed by: STUDENT IN AN ORGANIZED HEALTH CARE EDUCATION/TRAINING PROGRAM

## 2024-07-23 PROCEDURE — 36415 COLL VENOUS BLD VENIPUNCTURE: CPT | Performed by: INTERNAL MEDICINE

## 2024-07-23 PROCEDURE — 2020000001 HC ICU ROOM DAILY

## 2024-07-23 PROCEDURE — 2500000001 HC RX 250 WO HCPCS SELF ADMINISTERED DRUGS (ALT 637 FOR MEDICARE OP): Performed by: NURSE PRACTITIONER

## 2024-07-23 PROCEDURE — 93458 L HRT ARTERY/VENTRICLE ANGIO: CPT | Performed by: INTERNAL MEDICINE

## 2024-07-23 PROCEDURE — 2550000001 HC RX 255 CONTRASTS: Performed by: INTERNAL MEDICINE

## 2024-07-23 PROCEDURE — 85730 THROMBOPLASTIN TIME PARTIAL: CPT | Performed by: INTERNAL MEDICINE

## 2024-07-23 PROCEDURE — 97110 THERAPEUTIC EXERCISES: CPT | Mod: GP

## 2024-07-23 PROCEDURE — 82947 ASSAY GLUCOSE BLOOD QUANT: CPT

## 2024-07-23 PROCEDURE — 2500000004 HC RX 250 GENERAL PHARMACY W/ HCPCS (ALT 636 FOR OP/ED): Performed by: INTERNAL MEDICINE

## 2024-07-23 PROCEDURE — 4A023N7 MEASUREMENT OF CARDIAC SAMPLING AND PRESSURE, LEFT HEART, PERCUTANEOUS APPROACH: ICD-10-PCS | Performed by: INTERNAL MEDICINE

## 2024-07-23 PROCEDURE — 37799 UNLISTED PX VASCULAR SURGERY: CPT

## 2024-07-23 PROCEDURE — 2500000005 HC RX 250 GENERAL PHARMACY W/O HCPCS: Performed by: INTERNAL MEDICINE

## 2024-07-23 PROCEDURE — 84100 ASSAY OF PHOSPHORUS: CPT

## 2024-07-23 PROCEDURE — C1760 CLOSURE DEV, VASC: HCPCS | Performed by: INTERNAL MEDICINE

## 2024-07-23 PROCEDURE — G0269 OCCLUSIVE DEVICE IN VEIN ART: HCPCS | Mod: TC | Performed by: INTERNAL MEDICINE

## 2024-07-23 PROCEDURE — 97530 THERAPEUTIC ACTIVITIES: CPT | Mod: GP

## 2024-07-23 PROCEDURE — 85025 COMPLETE CBC W/AUTO DIFF WBC: CPT

## 2024-07-23 PROCEDURE — 83735 ASSAY OF MAGNESIUM: CPT

## 2024-07-23 PROCEDURE — 2500000004 HC RX 250 GENERAL PHARMACY W/ HCPCS (ALT 636 FOR OP/ED): Performed by: STUDENT IN AN ORGANIZED HEALTH CARE EDUCATION/TRAINING PROGRAM

## 2024-07-23 PROCEDURE — 2500000002 HC RX 250 W HCPCS SELF ADMINISTERED DRUGS (ALT 637 FOR MEDICARE OP, ALT 636 FOR OP/ED)

## 2024-07-23 PROCEDURE — 2500000001 HC RX 250 WO HCPCS SELF ADMINISTERED DRUGS (ALT 637 FOR MEDICARE OP)

## 2024-07-23 PROCEDURE — 97530 THERAPEUTIC ACTIVITIES: CPT | Mod: GO

## 2024-07-23 PROCEDURE — 80048 BASIC METABOLIC PNL TOTAL CA: CPT

## 2024-07-23 PROCEDURE — C1894 INTRO/SHEATH, NON-LASER: HCPCS | Performed by: INTERNAL MEDICINE

## 2024-07-23 PROCEDURE — 2500000004 HC RX 250 GENERAL PHARMACY W/ HCPCS (ALT 636 FOR OP/ED)

## 2024-07-23 PROCEDURE — 2720000007 HC OR 272 NO HCPCS: Performed by: INTERNAL MEDICINE

## 2024-07-23 PROCEDURE — 71045 X-RAY EXAM CHEST 1 VIEW: CPT | Performed by: RADIOLOGY

## 2024-07-23 PROCEDURE — 2780000003 HC OR 278 NO HCPCS: Performed by: INTERNAL MEDICINE

## 2024-07-23 RX ORDER — QUETIAPINE FUMARATE 25 MG/1
25 TABLET, FILM COATED ORAL ONCE
Status: COMPLETED | OUTPATIENT
Start: 2024-07-23 | End: 2024-07-23

## 2024-07-23 RX ORDER — ROCURONIUM BROMIDE 10 MG/ML
80 INJECTION, SOLUTION INTRAVENOUS ONCE
Status: DISCONTINUED | OUTPATIENT
Start: 2024-07-23 | End: 2024-07-23

## 2024-07-23 RX ORDER — HYDROXYZINE HYDROCHLORIDE 25 MG/1
25 TABLET, FILM COATED ORAL EVERY 6 HOURS PRN
Status: DISCONTINUED | OUTPATIENT
Start: 2024-07-23 | End: 2024-08-02 | Stop reason: HOSPADM

## 2024-07-23 RX ORDER — ETOMIDATE 2 MG/ML
10 INJECTION INTRAVENOUS ONCE
Status: DISCONTINUED | OUTPATIENT
Start: 2024-07-23 | End: 2024-07-23

## 2024-07-23 RX ORDER — ACETAMINOPHEN 500 MG
5 TABLET ORAL NIGHTLY PRN
Status: DISCONTINUED | OUTPATIENT
Start: 2024-07-23 | End: 2024-07-25

## 2024-07-23 RX ORDER — LIDOCAINE HYDROCHLORIDE 10 MG/ML
INJECTION, SOLUTION EPIDURAL; INFILTRATION; INTRACAUDAL; PERINEURAL AS NEEDED
Status: DISCONTINUED | OUTPATIENT
Start: 2024-07-23 | End: 2024-07-23 | Stop reason: HOSPADM

## 2024-07-23 RX ORDER — AMOXICILLIN 250 MG
1 CAPSULE ORAL 2 TIMES DAILY
Status: DISCONTINUED | OUTPATIENT
Start: 2024-07-23 | End: 2024-08-02 | Stop reason: HOSPADM

## 2024-07-23 RX ORDER — FENTANYL CITRATE 50 UG/ML
INJECTION, SOLUTION INTRAMUSCULAR; INTRAVENOUS AS NEEDED
Status: DISCONTINUED | OUTPATIENT
Start: 2024-07-23 | End: 2024-07-23 | Stop reason: HOSPADM

## 2024-07-23 RX ORDER — TALC
3 POWDER (GRAM) TOPICAL NIGHTLY PRN
Status: DISCONTINUED | OUTPATIENT
Start: 2024-07-23 | End: 2024-07-23

## 2024-07-23 RX ORDER — OLANZAPINE 5 MG/1
5 TABLET ORAL NIGHTLY PRN
Status: DISCONTINUED | OUTPATIENT
Start: 2024-07-23 | End: 2024-07-24

## 2024-07-23 RX ORDER — POLYETHYLENE GLYCOL 3350 17 G/17G
17 POWDER, FOR SOLUTION ORAL DAILY
Status: DISCONTINUED | OUTPATIENT
Start: 2024-07-23 | End: 2024-08-02 | Stop reason: HOSPADM

## 2024-07-23 RX ORDER — POTASSIUM CHLORIDE 14.9 MG/ML
20 INJECTION INTRAVENOUS
Status: COMPLETED | OUTPATIENT
Start: 2024-07-23 | End: 2024-07-23

## 2024-07-23 RX ORDER — MAGNESIUM SULFATE HEPTAHYDRATE 40 MG/ML
2 INJECTION, SOLUTION INTRAVENOUS ONCE
Status: COMPLETED | OUTPATIENT
Start: 2024-07-23 | End: 2024-07-23

## 2024-07-23 RX ORDER — PROPOFOL 10 MG/ML
5-50 INJECTION, EMULSION INTRAVENOUS CONTINUOUS
Status: DISCONTINUED | OUTPATIENT
Start: 2024-07-23 | End: 2024-07-23

## 2024-07-23 RX ORDER — IODIXANOL 320 MG/ML
INJECTION, SOLUTION INTRAVASCULAR AS NEEDED
Status: DISCONTINUED | OUTPATIENT
Start: 2024-07-23 | End: 2024-07-23 | Stop reason: HOSPADM

## 2024-07-23 RX ADMIN — PIPERACILLIN SODIUM AND TAZOBACTAM SODIUM 4.5 G: 4; .5 INJECTION, SOLUTION INTRAVENOUS at 22:38

## 2024-07-23 RX ADMIN — SENNOSIDES AND DOCUSATE SODIUM 1 TABLET: 50; 8.6 TABLET ORAL at 21:41

## 2024-07-23 RX ADMIN — Medication 5 L/MIN: at 08:00

## 2024-07-23 RX ADMIN — ATORVASTATIN CALCIUM 10 MG: 10 TABLET, FILM COATED ORAL at 21:42

## 2024-07-23 RX ADMIN — HEPARIN SODIUM 1100 UNITS/HR: 10000 INJECTION, SOLUTION INTRAVENOUS at 09:28

## 2024-07-23 RX ADMIN — POTASSIUM CHLORIDE 20 MEQ: 14.9 INJECTION, SOLUTION INTRAVENOUS at 05:52

## 2024-07-23 RX ADMIN — Medication 5 MG: at 21:41

## 2024-07-23 RX ADMIN — QUETIAPINE FUMARATE 25 MG: 25 TABLET ORAL at 21:41

## 2024-07-23 RX ADMIN — MAGNESIUM SULFATE HEPTAHYDRATE 2 G: 40 INJECTION, SOLUTION INTRAVENOUS at 05:52

## 2024-07-23 RX ADMIN — PIPERACILLIN SODIUM AND TAZOBACTAM SODIUM 4.5 G: 4; .5 INJECTION, SOLUTION INTRAVENOUS at 17:54

## 2024-07-23 RX ADMIN — METOPROLOL TARTRATE 5 MG: 5 INJECTION INTRAVENOUS at 15:42

## 2024-07-23 RX ADMIN — PIPERACILLIN SODIUM AND TAZOBACTAM SODIUM 4.5 G: 4; .5 INJECTION, SOLUTION INTRAVENOUS at 04:39

## 2024-07-23 RX ADMIN — PIPERACILLIN SODIUM AND TAZOBACTAM SODIUM 4.5 G: 4; .5 INJECTION, SOLUTION INTRAVENOUS at 11:40

## 2024-07-23 RX ADMIN — METOPROLOL TARTRATE 5 MG: 5 INJECTION INTRAVENOUS at 08:52

## 2024-07-23 RX ADMIN — POTASSIUM CHLORIDE 20 MEQ: 14.9 INJECTION, SOLUTION INTRAVENOUS at 08:52

## 2024-07-23 RX ADMIN — HYDROXYZINE HYDROCHLORIDE 25 MG: 25 TABLET ORAL at 21:41

## 2024-07-23 RX ADMIN — Medication 5 L/MIN: at 20:00

## 2024-07-23 RX ADMIN — METOPROLOL TARTRATE 5 MG: 5 INJECTION INTRAVENOUS at 21:41

## 2024-07-23 ASSESSMENT — PAIN - FUNCTIONAL ASSESSMENT
PAIN_FUNCTIONAL_ASSESSMENT: 0-10

## 2024-07-23 ASSESSMENT — COGNITIVE AND FUNCTIONAL STATUS - GENERAL
MOVING FROM LYING ON BACK TO SITTING ON SIDE OF FLAT BED WITH BEDRAILS: A LOT
TOILETING: TOTAL
MOVING FROM LYING ON BACK TO SITTING ON SIDE OF FLAT BED WITH BEDRAILS: TOTAL
DAILY ACTIVITIY SCORE: 12
CLIMB 3 TO 5 STEPS WITH RAILING: TOTAL
WALKING IN HOSPITAL ROOM: A LOT
STANDING UP FROM CHAIR USING ARMS: A LOT
HELP NEEDED FOR BATHING: A LOT
MOBILITY SCORE: 12
MOVING TO AND FROM BED TO CHAIR: A LOT
TOILETING: A LOT
DAILY ACTIVITIY SCORE: 11
STANDING UP FROM CHAIR USING ARMS: A LOT
CLIMB 3 TO 5 STEPS WITH RAILING: A LOT
DRESSING REGULAR UPPER BODY CLOTHING: A LOT
DRESSING REGULAR LOWER BODY CLOTHING: A LOT
HELP NEEDED FOR BATHING: A LOT
DRESSING REGULAR UPPER BODY CLOTHING: A LOT
PERSONAL GROOMING: A LOT
TURNING FROM BACK TO SIDE WHILE IN FLAT BAD: A LOT
EATING MEALS: TOTAL
MOBILITY SCORE: 10
WALKING IN HOSPITAL ROOM: A LOT
EATING MEALS: A LITTLE
TURNING FROM BACK TO SIDE WHILE IN FLAT BAD: A LOT
DRESSING REGULAR LOWER BODY CLOTHING: A LOT
PERSONAL GROOMING: A LOT
MOVING TO AND FROM BED TO CHAIR: A LOT

## 2024-07-23 ASSESSMENT — PAIN SCALES - GENERAL
PAINLEVEL_OUTOF10: 0 - NO PAIN

## 2024-07-23 NOTE — CARE PLAN
Problem: Knowledge Deficit  Goal: Patient/family/caregiver demonstrates understanding of disease process, treatment plan, medications, and discharge instructions  7/23/2024 1939 by Neri eDy RN  Outcome: Progressing  7/23/2024 1938 by Neri Dey RN  Outcome: Progressing     Problem: Mechanical Ventilation  Goal: Patient Will Maintain Patent Airway  7/23/2024 1939 by Neri Dey RN  Outcome: Progressing  7/23/2024 1938 by Neri Dey RN  Outcome: Progressing  Goal: Oral health is maintained or improved  7/23/2024 1939 by Neri Dey RN  Outcome: Progressing  7/23/2024 1938 by Neri Dey RN  Outcome: Progressing  Goal: ET tube will be managed safely  7/23/2024 1939 by Neri Dey RN  Outcome: Progressing  7/23/2024 1938 by Neri Dey RN  Outcome: Progressing  Goal: Ability to express needs and understand communication  7/23/2024 1939 by Neri Dey RN  Outcome: Progressing  7/23/2024 1938 by Neri Dey RN  Outcome: Progressing  Goal: Mobility/activity is maintained at optimum level for patient  7/23/2024 1939 by Neri Dey RN  Outcome: Progressing  7/23/2024 1938 by Neri Dey RN  Outcome: Progressing     Problem: Pain - Adult  Goal: Verbalizes/displays adequate comfort level or baseline comfort level  7/23/2024 1939 by Neri Dey RN  Outcome: Progressing  7/23/2024 1938 by Neri Dey RN  Outcome: Progressing     Problem: Safety - Adult  Goal: Free from fall injury  7/23/2024 1939 by Neri Dey RN  Outcome: Progressing  7/23/2024 1938 by Neri Dey RN  Outcome: Progressing     Problem: Discharge Planning  Goal: Discharge to home or other facility with appropriate resources  7/23/2024 1939 by Neri Dey RN  Outcome: Progressing  7/23/2024 1938 by Neri Dey RN  Outcome: Progressing     Problem: Chronic Conditions and Co-morbidities  Goal: Patient's chronic conditions and co-morbidity symptoms are  monitored and maintained or improved  7/23/2024 1939 by Neri Dey RN  Outcome: Progressing  7/23/2024 1938 by Neri Dey RN  Outcome: Progressing     Problem: Skin  Goal: Decreased wound size/increased tissue granulation at next dressing change  7/23/2024 1939 by Neri Dey RN  Outcome: Progressing  7/23/2024 1938 by Neri Dey RN  Outcome: Progressing  Goal: Participates in plan/prevention/treatment measures  7/23/2024 1939 by Neri Dey RN  Outcome: Progressing  7/23/2024 1938 by Neri Dey RN  Outcome: Progressing  Goal: Prevent/manage excess moisture  7/23/2024 1939 by Neri Dey RN  Outcome: Progressing  7/23/2024 1938 by Neri Dey RN  Outcome: Progressing  Goal: Prevent/minimize sheer/friction injuries  7/23/2024 1939 by Neri Dey RN  Outcome: Progressing  7/23/2024 1938 by Neri Dey RN  Outcome: Progressing  Goal: Promote/optimize nutrition  7/23/2024 1939 by Neri Dey RN  Outcome: Progressing  7/23/2024 1938 by Neri Dey RN  Outcome: Progressing  Goal: Promote skin healing  7/23/2024 1939 by Neri Dey RN  Outcome: Progressing  7/23/2024 1938 by Neri Dey RN  Outcome: Progressing     Problem: Nutrition  Goal: Less than 5 days NPO/clear liquids  7/23/2024 1939 by Neri Dey RN  Outcome: Progressing  7/23/2024 1938 by Neri Dey RN  Outcome: Progressing  Goal: Oral intake greater than 50%  7/23/2024 1939 by Neri Dey RN  Outcome: Progressing  7/23/2024 1938 by Neri Dey RN  Outcome: Progressing  Goal: Oral intake greater 75%  7/23/2024 1939 by Neri Dey RN  Outcome: Progressing  7/23/2024 1938 by Neri Dey RN  Outcome: Progressing  Goal: Consume prescribed supplement  7/23/2024 1939 by Neri Dey RN  Outcome: Progressing  7/23/2024 1938 by Neri Tiburcio, RN  Outcome: Progressing  Goal: Adequate PO fluid intake  7/23/2024 1939 by Neri Dey,  RN  Outcome: Progressing  7/23/2024 1938 by Neri Dey RN  Outcome: Progressing  Goal: Nutrition support goals are met within 48 hrs  7/23/2024 1939 by Neri Dey RN  Outcome: Progressing  7/23/2024 1938 by Neri Dey, RN  Outcome: Progressing  Goal: Nutrition support is meeting 75% of nutrient needs  7/23/2024 1939 by Neri Dey, RN  Outcome: Progressing  7/23/2024 1938 by Neri Dey, RN  Outcome: Progressing  Goal: Tube feed tolerance  7/23/2024 1939 by Neri Dey, RN  Outcome: Progressing  7/23/2024 1938 by Neri Dey, RN  Outcome: Progressing  Goal: BG  mg/dL  7/23/2024 1939 by Neri Dey, RN  Outcome: Progressing  7/23/2024 1938 by Neri Dey RN  Outcome: Progressing  Goal: Lab values WNL  7/23/2024 1939 by Neri Dey, RN  Outcome: Progressing  7/23/2024 1938 by Neri Dey, RN  Outcome: Progressing  Goal: Electrolytes WNL  7/23/2024 1939 by Neri Dey, RN  Outcome: Progressing  7/23/2024 1938 by Neri Dey RN  Outcome: Progressing  Goal: Promote healing  7/23/2024 1939 by Neri Dey, RN  Outcome: Progressing  7/23/2024 1938 by Neri Dey, RN  Outcome: Progressing  Goal: Maintain stable weight  7/23/2024 1939 by Neri Dey, RN  Outcome: Progressing  7/23/2024 1938 by Neri Dey RN  Outcome: Progressing     Problem: Fall/Injury  Goal: Not fall by end of shift  7/23/2024 1939 by Neri Dey, RN  Outcome: Progressing  7/23/2024 1938 by Neri Dey, RN  Outcome: Progressing  Goal: Be free from injury by end of the shift  7/23/2024 1939 by Neri Dey, RN  Outcome: Progressing  7/23/2024 1938 by Neri Dey, RN  Outcome: Progressing  Goal: Verbalize understanding of personal risk factors for fall in the hospital  7/23/2024 1939 by Neri Dey, RN  Outcome: Progressing  7/23/2024 1938 by Neri Dey, RN  Outcome: Progressing  Goal: Verbalize understanding of risk factor  reduction measures to prevent injury from fall in the home  7/23/2024 1939 by Neri Dey RN  Outcome: Progressing  7/23/2024 1938 by Neri Dey RN  Outcome: Progressing  Goal: Use assistive devices by end of the shift  7/23/2024 1939 by Neri Dey RN  Outcome: Progressing  7/23/2024 1938 by Neri Dey RN  Outcome: Progressing  Goal: Pace activities to prevent fatigue by end of the shift  7/23/2024 1939 by Neri Dey RN  Outcome: Progressing  7/23/2024 1938 by Neri Dey RN  Outcome: Progressing     Problem: Safety - Medical Restraint  Goal: Remains free of injury from restraints (Restraint for Interference with Medical Device)  7/23/2024 1939 by Neri Dey RN  Outcome: Progressing  7/23/2024 1938 by Neri Dey RN  Outcome: Progressing  Goal: Free from restraint(s) (Restraint for Interference with Medical Device)  7/23/2024 1939 by Neri Dey RN  Outcome: Progressing  7/23/2024 1938 by Neri Dey RN  Outcome: Progressing     Problem: Risk for falls  Goal: I will remain free from falls  7/23/2024 1939 by Neri Dey RN  Outcome: Progressing  7/23/2024 1938 by Neri Dey RN  Outcome: Progressing     Problem: ADLs  Goal: Pt will complete ADL tasks at mod I with use of AE prn   7/23/2024 1939 by Neri Dey RN  Outcome: Progressing  7/23/2024 1938 by Neri Dey RN  Outcome: Progressing   The patient's goals for the shift include comfort     The clinical goals for the shift include safety    Over the shift, the patient did not make progress toward the following goals. Barriers to progression include none. Recommendations to address these barriers include none.

## 2024-07-23 NOTE — PROGRESS NOTES
Physical Therapy    Physical Therapy Treatment    Patient Name: Nilam Powell  MRN: [id number]  Today's Date: 7/23/2024  Time Calculation  Start Time: 1300  Stop Time: 1325  Time Calculation (min): 25 min    Assessment/Plan   PT Assessment  PT Assessment Results: Decreased strength, Decreased range of motion, Decreased endurance, Impaired balance, Decreased mobility, Decreased coordination, Decreased cognition, Impaired judgement, Decreased safety awareness, Pain, Obesity  Rehab Prognosis: Good  Barriers to Discharge: medical status  Evaluation/Treatment Tolerance: Patient tolerated treatment well, Patient limited by fatigue  Medical Staff Made Aware: Yes  End of Session Communication: Bedside nurse  Assessment Comment: pt slowly progressing; pt able to follow commands and participate today. MOD A x 2 for transfers. poor insight to safety  End of Session Patient Position: Bed, 4 rail up, Alarm on (call button in reach)  PT Plan  Inpatient/Swing Bed or Outpatient: Inpatient    PT Plan  Treatment/Interventions: Bed mobility, Transfer training, Gait training, Stair training, Balance training, Neuromuscular re-education, Strengthening, Endurance training, Range of motion, Therapeutic exercise, Therapeutic activity  PT Plan: Ongoing PT  PT Frequency: 6 times per week  PT Discharge Recommendations: Moderate intensity level of continued care  Equipment Recommended upon Discharge: Wheeled walker  PT Recommended Transfer Status: Assist x2  PT - OK to Discharge: Yes      General Visit Information:   PT  Visit  PT Received On: 07/23/24  General  Reason for Referral: impaired functional mobility; Pt admitted from home after being found unresponsive. Upon EMS arrival pt was found to be pulseless, CPR was performed. Pt was found to be in ventricular fibrillation, underwent cardioversion. Pt was intubated 7/19 extubated 7/21  Past Medical History Relevant to Rehab: HTN, DLP, gout, uterine fibroid, Colonoscopy,  hysterectomy  Family/Caregiver Present:  (pts spouse present)  Co-Treatment: OT  Co-Treatment Reason: medically complex ICU pt requiring two-person skilled assist for safe pt handling  Prior to Session Communication: Bedside nurse  Patient Position Received: Bed, 4 rail up, Alarm on  General Comment: pt alert and cooperative; pt  willing to work with therapy; pt wearing Bilateral wrist restraints    Subjective   Precautions:  Precautions  Hearing/Visual Limitations: Glasses  Medical Precautions: Fall precautions, Oxygen therapy device and L/min  Precautions Comment: educated and instructed pt in safety techniques      Vital Signs:  Vital Signs  Heart Rate: 64  SpO2:  (95% wearing 5L of oxygen;)  BP: (!) 127/95    Objective   Pain:  Pain Assessment  Pain Assessment:  (0/10)    Cognition:  Cognition  Overall Cognitive Status: Within Functional Limits  Orientation Level: Disoriented to situation  Insight: Moderate (impaired safety awareness)  Processing Speed: Delayed    Coordination:  Coordination Comment: decreased rate/accuracy of movements    Postural Control:  Postural Control  Posture Comment: forward head with postural sway sitting on EOB      Activity Tolerance:  Activity Tolerance  Endurance:  (FAIR activity tolerance during session;)    Treatments:    Therapeutic Exercise Performed:  (B LE therex: AP, HS, ABD, LAQ, calf raises x 8-10 reps; VC to stay on task)       Bed Mobility:  supine to sit with MOD A x 2 for trunk up, scooting and B LE. pt transferred to EOB in increments. increased postural sway noted upon sitting. pt sat on EOB ~ 8 minutes with intermittent MIN A for trunk support. sit to supine with MAX A x 2 for trunk and B LE. Nursing staff present      Ambulation/Gait Training Performed:  pt took 5-6 short inconsistent lateral steps toward HOB with MOD A x 2/Handheld A x 2. pt had difficulty weight shifting and advancing steps. frequent L knee buckling noted. L knee blocked throughout  WB      Transfer:  sit <-> stand with MOD A x 2/Handheld A x 2 blocking L knee. pt presented with postural sway and L knee buckling;         Outcome Measures:  [facility] Basic Mobility  Turning from your back to your side while in a flat bed without using bedrails: Total  Moving from lying on your back to sitting on the side of a flat bed without using bedrails: A lot  Moving to and from bed to chair (including a wheelchair): A lot  Standing up from a chair using your arms (e.g. wheelchair or bedside chair): A lot  To walk in hospital room: A lot  Climbing 3-5 steps with railing: Total  Basic Mobility - Total Score: 10           Encounter Problems       Encounter Problems (Active)       Balance       STG - Maintains static standing balance with upper extremity support and min assist x 1. (Progressing)       Start:  07/22/24    Expected End:  08/22/24       INTERVENTIONS:  1. Practice standing with minimal support.  2. Educate patient about standing tolerance.  3. Educate patient about independence with gait, transfers, and ADL's.  4. Educate patient about use of assistive device.  5. Educate patient about self-directed care.         STG - Maintains static sitting balance with upper extremity support and supervision for safety. (Progressing)       Start:  07/22/24    Expected End:  08/22/24       INTERVENTIONS:  1. Practice sitting on the edge of a bed/mat with minimal support.  2. Educate patient about maintining total hip precautions while maintaining balance.  3. Educate patient about pressure relief.  4. Educate patient about use of assistive device.            Mobility       STG - Patient will ambulate 50' with use of rolling walker and min assist x 1. (Progressing)       Start:  07/22/24    Expected End:  08/22/24            STG - Patient will ambulate up and down a curb/step with min assist x 1. (Progressing)       Start:  07/22/24    Expected End:  08/22/24               PT Transfers       STG - Patient to  transfer to and from sit to supine with supervision for safety. (Progressing)       Start:  07/22/24    Expected End:  08/22/24            STG - Patient will transfer sit to and from stand with use of rolling walker and min assist x 1. (Progressing)       Start:  07/22/24    Expected End:  08/22/24               Pain - Adult          Safety       STG - Patient uses call light consistently to request assistance with transfers (Progressing)       Start:  07/22/24    Expected End:  08/22/24

## 2024-07-23 NOTE — PROGRESS NOTES
Speech-Language Pathology                 Therapy Communication Note    Patient Name: Nilam Powell  MRN: [id number]  Today's Date: 7/23/2024     Discipline: Speech Language Pathology    Missed Visit Reason:  Pt is NPO for possible heart cath today per RN    Missed Time: Cancel    Comment:

## 2024-07-23 NOTE — NURSING NOTE
Pt pulling off NC and and attempting to pull out gutierrez catheter.  While attempting to redirect patient, patient started hitting staff members.  Pt put in BSWR's.  RENEA notified.  Order for restraints received.

## 2024-07-23 NOTE — PROGRESS NOTES
I have seen the patient either independently or with an associated resident physician or advanced practice provider    Overnight Events: Patient had continued encephalopathy, worsened when family members were not at bedside. She required soft restraints due to attempt to grab gutierrez.    Physical Exam  Vitals reviewed.   Constitutional:       General: She is not in acute distress.  HENT:      Mouth/Throat:      Mouth: Mucous membranes are dry.   Eyes:      Pupils: Pupils are equal, round, and reactive to light.   Cardiovascular:      Rate and Rhythm: Normal rate and regular rhythm.   Pulmonary:      Effort: No respiratory distress.   Abdominal:      Palpations: Abdomen is soft.   Musculoskeletal:         General: Normal range of motion.      Cervical back: Normal range of motion.   Skin:     General: Skin is warm and dry.   Neurological:      Mental Status: She is alert.      Comments: Alert and oriented to person only. Moves four extremities independently. CAM positive         Neuro: Acute metabolic encephalopathy vs anoxic brain injury. Delirium precautions. Ordered 5 mg oral zyprexa nightly prn for agitation. Bilateral soft wrist restraints needed at this time, will continue to reassess for removal. Ordered melatonin 5 mg nightly at 1900 for sleep.  Cardiac: S/p VF cardiac arrest with pre-hospital ROSC. Heart cath today for ischemic w/u. Continue home ASA, statin. Will transition metoprolol IV to 25 mg oral metoprolol tartrate every 12 hours when tolerating PO.  Pulmonary: Acute respiratory failure with hypoxia and hypercapnia. Oxygen requirement increased to 6L this morning, ordered cxray.  Gastrointestinal: NPO this morning for heart cath, will continue with cardiac diet easy to chew with thin liquids on return. Bowel regimen per protocol.  Renal: KASH continues to improve. Remove gutierrez today.  Endocrine: No history of IDDM. Will continue glucose checks until tolerating diet then plan to discontinue if remains in  acceptable control range.  Hematology: Heparin gtt continued for acs, anticipate plan to discontinue post-procedure. ASA continued.  Infectious Disease: Leukocytosis with continued improvement, zosyn stop date 7/24 to complete five days of prophylactic antibiotic coverage in setting of cardiac arrest.  Musculoskeletal: No acute issues    Lines/Tubes/Drains: PIV x 2, RIJ triple lumen CVC - plan to discontinue post procedure, ordered foly removed      Prophylaxis: Heparin gtt for acs  Med to Discontinue: None    Disposition: ICU     ABCDEF Checklist  Analgesia: Reviewed  Breathing: Not intubated, on 6L NC  Choice of analgesia/sedation: Analgesic and sedative agents adjusted per clinical context.   Delirium assessed by CAM, will avoid exacerbating factors   Early mobility and exercise: Physical and occupational therapy engaged   Family: Plan of care, overall trajectory of patient shared with family. Questions elicited and answered as appropriate.       Due to the high probability of life threatening clinical decompensation, the patient required critical care time evaluating and managing this patient.  Critical care time included obtaining a history, examining the patient, ordering and reviewing studies, discussing, developing, and implementing a management plan, evaluating the patient's response to treatment, and discussion with other care team providers. I saw and evaluated the patient myself.  Critical care time was performed exclusive of billable procedures.    Critical care time: 35 minutes

## 2024-07-23 NOTE — PROGRESS NOTES
"Subjective Data:  Confused though answers    Overnight Events:    Confused      Objective Data:  Last Recorded Vitals:  Vitals:    07/23/24 1549 07/23/24 1659 07/23/24 1700 07/23/24 1727   BP:   (!) 122/92 (!) 187/109   BP Location:       Patient Position:       Pulse:   64 67   Resp:   18 20   Temp: 37 °C (98.6 °F)      TempSrc: Temporal      SpO2:  95% 95% 94%   Weight:       Height:           Last Labs:  CBC - 7/23/2024:  4:43 AM  15.6 10.5 180    33.5      CMP - 7/23/2024:  4:43 AM  8.6 6.8 101 --- 0.5   3.3 3.2 91 122      PTT - 7/23/2024: 12:31 PM  1.0   11.0 48.4     HGBA1C   Date/Time Value Ref Range Status   06/29/2023 09:52 AM 5.7 4.3 - 5.6 % Final     Comment:     American Diabetes Association guidelines indicate that patients with HgbA1c in the range 5.7-6.4% are at increased risk for development of diabetes, and intervention by lifestyle modification may be beneficial. HgbA1c greater or equal to 6.5% is considered diagnostic of diabetes.   12/03/2021 02:00 PM 6.0 4.3 - 5.6 % Final     Comment:     American Diabetes Association guidelines indicate that patients with HgbA1c in   the range 5.7-6.4% are at increased risk for development of diabetes, and   intervention by lifestyle modification may be beneficial. HgbA1c greater or   equal to 6.5% is considered diagnostic of diabetes.      Last I/O:  I/O last 3 completed shifts:  In: 603.5 (6 mL/kg) [I.V.:203.5 (2 mL/kg); IV Piggyback:400]  Out: 2470 (24.4 mL/kg) [Urine:2470 (0.7 mL/kg/hr)]  Weight: 101.1 kg     Past Cardiology Tests (Last 3 Years):  EKG:  ECG 12 lead 07/19/2024      ECG 12 lead 07/19/2024    Echo:  No results found for this or any previous visit from the past 1095 days.    Ejection Fractions:  No results found for: \"EF\"  Cath:  No results found for this or any previous visit from the past 1095 days.    Stress Test:  No results found for this or any previous visit from the past 1095 days.    Cardiac Imaging:  No results found for this or any " previous visit from the past 1095 days.      Inpatient Medications:  Scheduled medications   Medication Dose Route Frequency    aspirin  81 mg oral Daily    atorvastatin  10 mg oral Nightly    insulin lispro  0-5 Units subcutaneous q4h    metoprolol  5 mg intravenous q6h    oxygen   inhalation Continuous - Inhalation    oxygen   inhalation Continuous - 02/gases    piperacillin-tazobactam  4.5 g intravenous q6h    polyethylene glycol  17 g oral Daily    sennosides-docusate sodium  1 tablet oral BID     PRN medications   Medication    acetaminophen    bisacodyl    dextrose    dextrose    glucagon    glucagon    heparin (porcine)    melatonin    OLANZapine     Continuous Medications   Medication Dose Last Rate    heparin  0-4,000 Units/hr 1,100 Units/hr (07/23/24 1337)       Physical Exam:  Confused, oriented to self, neck veins are not elevated, upstrokes and volumes normal, lungs diminished anteriorly, normal sounding S1 single S2 no cardiac murmurs, PMI nondisplaced, extremities without edema     Assessment/Plan   73-year-old female presents with cardiac arrest defibrillated w/ROSC, initially intubated with encephalopathy, extubated 7/21 with improvement in mental status though not back to baseline     Would work to maintain potassium 4.0 and above  Continue with beta-blocker transition to oral if able  Anatomy to be evaluated tomorrow  If no culprit lesion would recommend ICD for secondary prevention, would repeat EKG to reassess QRS for biventricular indication   7/23:  Underwent cardiac catheterization without any obstructive lesion requiring revascularization  Preserved left ventricular function  Tolerating beta-blocker, occasional VPB's  Neurology continuing to evaluate, EEG done today to rule out seizures  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 Goldman       CVC 07/19/24 Triple lumen Right Internal jugular (Active)   Site Assessment Clean;Dry;Intact 07/23/24 1636   Number of days: 4       Code Status:  Full Code    I spent 20 minutes in the professional and overall care of this patient.        Grace Russ, APRN-CNP

## 2024-07-23 NOTE — CARE PLAN
The clinical goals for the shift include Maintain hemodynamic stability and patient saftey    Problem: Knowledge Deficit  Goal: Patient/family/caregiver demonstrates understanding of disease process, treatment plan, medications, and discharge instructions  Outcome: Progressing     Problem: Pain - Adult  Goal: Verbalizes/displays adequate comfort level or baseline comfort level  Outcome: Progressing     Problem: Safety - Adult  Goal: Free from fall injury  Outcome: Progressing     Problem: Discharge Planning  Goal: Discharge to home or other facility with appropriate resources  Outcome: Progressing     Problem: Chronic Conditions and Co-morbidities  Goal: Patient's chronic conditions and co-morbidity symptoms are monitored and maintained or improved  Outcome: Progressing     Problem: Skin  Goal: Decreased wound size/increased tissue granulation at next dressing change  Outcome: Progressing  Flowsheets (Taken 7/23/2024 1245)  Decreased wound size/increased tissue granulation at next dressing change:   Promote sleep for wound healing   Protective dressings over bony prominences   Utilize specialty bed per algorithm  Goal: Participates in plan/prevention/treatment measures  Outcome: Progressing  Flowsheets (Taken 7/23/2024 1245)  Participates in plan/prevention/treatment measures:   Discuss with provider PT/OT consult   Elevate heels   Increase activity/out of bed for meals  Goal: Prevent/manage excess moisture  Outcome: Progressing  Flowsheets (Taken 7/23/2024 1245)  Prevent/manage excess moisture:   Monitor for/manage infection if present   Follow provider orders for dressing changes   Moisturize dry skin   Cleanse incontinence/protect with barrier cream  Goal: Prevent/minimize sheer/friction injuries  Outcome: Progressing  Flowsheets (Taken 7/23/2024 1245)  Prevent/minimize sheer/friction injuries:   Complete micro-shifts as needed if patient unable. Adjust patient position to relieve pressure points, not a full  turn   Increase activity/out of bed for meals   Use pull sheet   HOB 30 degrees or less   Turn/reposition every 2 hours/use positioning/transfer devices   Utilize specialty bed per algorithm  Goal: Promote/optimize nutrition  Outcome: Progressing  Flowsheets (Taken 7/23/2024 1245)  Promote/optimize nutrition:   Monitor/record intake including meals   Consume > 50% meals/supplements   Offer water/supplements/favorite foods   Discuss with provider if NPO > 2 days   Assist with feeding  Goal: Promote skin healing  Outcome: Progressing  Flowsheets (Taken 7/23/2024 1245)  Promote skin healing:   Assess skin/pad under line(s)/device(s)   Turn/reposition every 2 hours/use positioning/transfer devices   Protective dressings over bony prominences   Rotate device position/do not position patient on device   Ensure correct size (line/device) and apply per  instructions     Problem: Nutrition  Goal: Less than 5 days NPO/clear liquids  Outcome: Progressing  Goal: Oral intake greater than 50%  Outcome: Progressing  Goal: Oral intake greater 75%  Outcome: Progressing  Goal: Consume prescribed supplement  Outcome: Progressing  Goal: Adequate PO fluid intake  Outcome: Progressing  Goal: Nutrition support goals are met within 48 hrs  Outcome: Progressing  Goal: Nutrition support is meeting 75% of nutrient needs  Outcome: Progressing  Goal: BG  mg/dL  Outcome: Progressing  Goal: Lab values WNL  Outcome: Progressing  Goal: Electrolytes WNL  Outcome: Progressing  Goal: Promote healing  Outcome: Progressing  Goal: Maintain stable weight  Outcome: Progressing     Problem: Fall/Injury  Goal: Not fall by end of shift  Outcome: Progressing  Goal: Be free from injury by end of the shift  Outcome: Progressing  Goal: Verbalize understanding of personal risk factors for fall in the hospital  Outcome: Progressing  Goal: Verbalize understanding of risk factor reduction measures to prevent injury from fall in the home  Outcome:  Progressing  Goal: Use assistive devices by end of the shift  Outcome: Progressing  Goal: Pace activities to prevent fatigue by end of the shift  Outcome: Progressing     Problem: Safety - Medical Restraint  Goal: Remains free of injury from restraints (Restraint for Interference with Medical Device)  Outcome: Progressing  Goal: Free from restraint(s) (Restraint for Interference with Medical Device)  Outcome: Progressing     Problem: Risk for falls  Goal: I will remain free from falls  Outcome: Progressing

## 2024-07-23 NOTE — CARE PLAN
Problem: Knowledge Deficit  Goal: Patient/family/caregiver demonstrates understanding of disease process, treatment plan, medications, and discharge instructions  Outcome: Progressing     Problem: Mechanical Ventilation  Goal: Patient Will Maintain Patent Airway  Outcome: Progressing  Goal: Oral health is maintained or improved  Outcome: Progressing  Goal: ET tube will be managed safely  Outcome: Progressing  Goal: Ability to express needs and understand communication  Outcome: Progressing  Goal: Mobility/activity is maintained at optimum level for patient  Outcome: Progressing     Problem: Pain - Adult  Goal: Verbalizes/displays adequate comfort level or baseline comfort level  Outcome: Progressing     Problem: Safety - Adult  Goal: Free from fall injury  Outcome: Progressing     Problem: Discharge Planning  Goal: Discharge to home or other facility with appropriate resources  Outcome: Progressing     Problem: Chronic Conditions and Co-morbidities  Goal: Patient's chronic conditions and co-morbidity symptoms are monitored and maintained or improved  Outcome: Progressing     Problem: Skin  Goal: Decreased wound size/increased tissue granulation at next dressing change  Outcome: Progressing  Goal: Participates in plan/prevention/treatment measures  Outcome: Progressing  Goal: Prevent/manage excess moisture  Outcome: Progressing  Goal: Prevent/minimize sheer/friction injuries  Outcome: Progressing  Goal: Promote/optimize nutrition  Outcome: Progressing  Goal: Promote skin healing  Outcome: Progressing     Problem: Nutrition  Goal: Less than 5 days NPO/clear liquids  Outcome: Progressing  Goal: Oral intake greater than 50%  Outcome: Progressing  Goal: Oral intake greater 75%  Outcome: Progressing  Goal: Consume prescribed supplement  Outcome: Progressing  Goal: Adequate PO fluid intake  Outcome: Progressing  Goal: Nutrition support goals are met within 48 hrs  Outcome: Progressing  Goal: Nutrition support is meeting  75% of nutrient needs  Outcome: Progressing  Goal: Tube feed tolerance  Outcome: Progressing  Goal: BG  mg/dL  Outcome: Progressing  Goal: Lab values WNL  Outcome: Progressing  Goal: Electrolytes WNL  Outcome: Progressing  Goal: Promote healing  Outcome: Progressing  Goal: Maintain stable weight  Outcome: Progressing     Problem: Fall/Injury  Goal: Not fall by end of shift  Outcome: Progressing  Goal: Be free from injury by end of the shift  Outcome: Progressing  Goal: Verbalize understanding of personal risk factors for fall in the hospital  Outcome: Progressing  Goal: Verbalize understanding of risk factor reduction measures to prevent injury from fall in the home  Outcome: Progressing  Goal: Use assistive devices by end of the shift  Outcome: Progressing  Goal: Pace activities to prevent fatigue by end of the shift  Outcome: Progressing     Problem: Safety - Medical Restraint  Goal: Remains free of injury from restraints (Restraint for Interference with Medical Device)  Outcome: Progressing  Goal: Free from restraint(s) (Restraint for Interference with Medical Device)  Outcome: Progressing     Problem: Risk for falls  Goal: I will remain free from falls  Outcome: Progressing     Problem: ADLs  Goal: Pt will complete ADL tasks at mod I with use of AE prn   Outcome: Progressing   The patient's goals for the shift include comfort     The clinical goals for the shift include safety    Over the shift, the patient did not make progress toward the following goals. Barriers to progression include none.   Recommendations to address these barriers include none.

## 2024-07-23 NOTE — PROGRESS NOTES
Patient not medically clear. Patient remains in the ICU. Patient in restraints. At this time there is not a safe discharge plan in place. Will follow.      07/23/24 0933   Discharge Planning   Home or Post Acute Services Other (Comment)  (TBD)   Expected Discharge Disposition Other  (TBD)   Does the patient need discharge transport arranged? No

## 2024-07-23 NOTE — PROGRESS NOTES
Occupational Therapy    OT Treatment    Patient Name: Nilam Powell  MRN: [id number]  Today's Date: 7/23/2024  Time Calculation  Start Time: 1302  Stop Time: 1326  Time Calculation (min): 24 min        Assessment:  OT Assessment: steady progress, pt still with significant confusion and decreased balance/endurance  Prognosis: Fair  Barriers to Discharge: Decreased caregiver support, Inaccessible home environment  Evaluation/Treatment Tolerance: Patient limited by fatigue  Medical Staff Made Aware: Yes  End of Session Communication: Bedside nurse  End of Session Patient Position: Bed, 3 rail up, Alarm off, caregiver present  OT Assessment Results: Decreased ADL status, Decreased cognition, Decreased endurance, Decreased safe judgment during ADL, Decreased functional mobility, Decreased trunk control for functional activities  Prognosis: Fair  Barriers to Discharge: Decreased caregiver support, Inaccessible home environment  Evaluation/Treatment Tolerance: Patient limited by fatigue  Medical Staff Made Aware: Yes  Strengths: Support of Caregivers  Barriers to Participation: Ability to acquire knowledge, Comorbidities  Plan:  Treatment Interventions: ADL retraining, Functional transfer training, UE strengthening/ROM, Endurance training, Cognitive reorientation, Patient/family training, Equipment evaluation/education, Compensatory technique education  OT Frequency: 5 times per week  OT Discharge Recommendations: Moderate intensity level of continued care  Equipment Recommended upon Discharge: Wheeled walker  OT Recommended Transfer Status: Assist of 2, Moderate assist  OT - OK to Discharge: Yes  Treatment Interventions: ADL retraining, Functional transfer training, UE strengthening/ROM, Endurance training, Cognitive reorientation, Patient/family training, Equipment evaluation/education, Compensatory technique education    Subjective   Previous Visit Info:  OT Last Visit  OT Received On: 07/23/24  General:  General  Reason  for Referral: impaired functional mobility; Pt admitted from home after being found unresponsive. Upon EMS arrival pt was found to be pulseless, CPR was performed. Pt was found to be in ventricular fibrillation, underwent cardioversion. Pt was intubated 7/19 extubated 7/21  Past Medical History Relevant to Rehab: HTN, DLP, gout, uterine fibroid, Colonoscopy, hysterectomy  Family/Caregiver Present: Yes  Caregiver Feedback:  present for session  Co-Treatment: PT  Co-Treatment Reason: ICU status and was agitated earlier; to maximize safety and participation  Prior to Session Communication: Bedside nurse  Patient Position Received: Bed, 3 rail up, Alarm on  Preferred Learning Style: verbal, visual  General Comment: pt cooperative however still with confusion and impulsivity.  pt requiring intermittent redirection throughout session  Precautions:  Medical Precautions: Fall precautions, Oxygen therapy device and L/min (5L)  Vital Signs:  Vital Signs  Heart Rate: 75  SpO2: 91 %  BP: (!) 137/101  Pain:  Pain Assessment  Pain Assessment: 0-10  0-10 (Numeric) Pain Score: 0 - No pain    Objective    Cognition:  Cognition  Overall Cognitive Status: Impaired  Orientation Level: Disoriented to place, Disoriented to time, Disoriented to situation  Following Commands: Follows one step commands with repetition  Safety Judgment: Decreased awareness of need for assistance  Memory: Exceptions to WFL  Long-Term Memory: Impaired  Short-Term Memory: Impaired  Insight: Moderate  Impulsive: Moderately     Activities of Daily Living: LE Dressing  Sock Level of Assistance: Maximum assistance  LE Dressing Where Assessed: Bed level  LE Dressing Comments:  (donning/doffing socks)  Functional Standing Tolerance:  Time: 2-3 minutes  Bed Mobility/Transfers: Bed Mobility 1  Bed Mobility 1: Supine to sitting  Level of Assistance 1: Moderate assistance, +2  Bed Mobility Comments 1: MOD A x2 for managing BLE to edge of bed and for lifting trunk.   pt able to assist with cueing however limited by weakness/fatigue  Bed Mobility 2  Bed Mobility  2: Sitting to supine  Level of Assistance 2: Moderate assistance, +2  Bed Mobility Comments 2: MOD A x2 to lift BLE back into bed and for lowering/positioning trunk    Transfers  Transfer: Yes  Transfer 1  Technique 1: Sit to stand, Stand to sit  Transfer Level of Assistance 1: Moderate assistance, +2  Trials/Comments 1: MOD A x2 to ascend from edge of bed with arm and arm assist.  pt requiring blocking of L knee to prevent buckling.  cues for postural corrections once standing  Transfers 2  Technique 2: Lateral  Transfer Level of Assistance 2: Moderate assistance, +2  Trials/Comments 2: MOD A x2 for lateral stepping towards left side with arm and arm assist; pt requiring blocking of LLE    Sitting Balance:  Static Sitting Balance  Static Sitting-Balance Support: Feet supported, Bilateral upper extremity supported  Static Sitting-Level of Assistance: Minimum assistance, Contact guard  Static Sitting-Comment/Number of Minutes: tolerated sitting EOB for ~8 minutes total; initially requiring MIN A but progressed to CGA with postural corrections.  during sitting pt performed dynamic reaching task with incorporated anterior leaning; x8 reps x2 sets total.  Standing Balance:  Static Standing Balance  Static Standing-Balance Support: Bilateral upper extremity supported  Static Standing-Level of Assistance: Moderate assistance (x2)      Outcome Measures:[facility] Daily Activity  Putting on and taking off regular lower body clothing: A lot  Bathing (including washing, rinsing, drying): A lot  Putting on and taking off regular upper body clothing: A lot  Toileting, which includes using toilet, bedpan or urinal: Total  Taking care of personal grooming such as brushing teeth: A lot  Eating Meals: A little  Daily Activity - Total Score: 12        Education Documentation  Body Mechanics, taught by Rolly Gardner OT at 7/23/2024  2:12  PM.  Learner: Patient  Readiness: Acceptance  Method: Demonstration, Explanation  Response: Needs Reinforcement    ADL Training, taught by Rolly Gardner OT at 7/23/2024  2:12 PM.  Learner: Patient  Readiness: Acceptance  Method: Demonstration, Explanation  Response: Needs Reinforcement    Education Comments  No comments found.        OP EDUCATION:       Goals:  Encounter Problems       Encounter Problems (Active)       ADLs       Pt will complete ADL tasks at mod I with use of AE prn  (Progressing)       Start:  07/22/24    Expected End:  08/26/24               Functional Mobility       Pt will perform functional mobility household distances at mod I with use of RW.    (Progressing)       Start:  07/22/24    Expected End:  08/26/24               OT Transfers       Pt will perform BUE exercises to improve strength and independence with functional transfers/ADL tasks.   (Progressing)       Start:  07/22/24    Expected End:  08/26/24

## 2024-07-23 NOTE — PROGRESS NOTES
"Subjective   Patient is confused and combative and wondering why so many people are in her room at home.  She has been effectively sedated with Haldol.     Objective   Neurological Exam  Physical Exam    Last Recorded Vitals  Blood pressure 123/85, pulse 82, temperature 37 °C (98.6 °F), temperature source Axillary, resp. rate 22, height 1.702 m (5' 7\"), weight 97.8 kg (215 lb 9.8 oz), SpO2 92%.      Neurologically, pt is sedated but moving all extremities with ease against gravity.  Aside from increasing confusion the patient's neurologic status is approximately unchanged compared to yesterday      Scheduled medications  aspirin, 81 mg, oral, Daily  atorvastatin, 10 mg, oral, Nightly  insulin lispro, 0-5 Units, subcutaneous, q4h  metoprolol, 5 mg, intravenous, q6h  oxygen, , inhalation, Continuous - Inhalation  oxygen, , inhalation, Continuous - Inhalation  piperacillin-tazobactam, 4.5 g, intravenous, q6h  potassium chloride, 20 mEq, intravenous, q2h      Continuous medications  heparin, 0-4,000 Units/hr, Last Rate: 800 Units/hr (07/22/24 1814)      PRN medications  PRN medications: acetaminophen, bisacodyl, dextrose, dextrose, glucagon, glucagon, heparin (porcine)     Results for orders placed or performed during the hospital encounter of 07/19/24 (from the past 96 hour(s))   POCT GLUCOSE   Result Value Ref Range    POCT Glucose 188 (H) 74 - 99 mg/dL   CBC and Auto Differential   Result Value Ref Range    WBC 14.7 (H) 4.4 - 11.3 x10*3/uL    nRBC 0.0 0.0 - 0.0 /100 WBCs    RBC 4.34 4.00 - 5.20 x10*6/uL    Hemoglobin 12.0 12.0 - 16.0 g/dL    Hematocrit 39.6 36.0 - 46.0 %    MCV 91 80 - 100 fL    MCH 27.6 26.0 - 34.0 pg    MCHC 30.3 (L) 32.0 - 36.0 g/dL    RDW 15.9 (H) 11.5 - 14.5 %    Platelets 246 150 - 450 x10*3/uL    Neutrophils % 63.8 40.0 - 80.0 %    Immature Granulocytes %, Automated 4.5 (H) 0.0 - 0.9 %    Lymphocytes % 26.9 13.0 - 44.0 %    Monocytes % 3.5 2.0 - 10.0 %    Eosinophils % 0.9 0.0 - 6.0 %    " Basophils % 0.4 0.0 - 2.0 %    Neutrophils Absolute 9.40 (H) 1.60 - 5.50 x10*3/uL    Immature Granulocytes Absolute, Automated 0.66 (H) 0.00 - 0.50 x10*3/uL    Lymphocytes Absolute 3.96 (H) 0.80 - 3.00 x10*3/uL    Monocytes Absolute 0.52 0.05 - 0.80 x10*3/uL    Eosinophils Absolute 0.13 0.00 - 0.40 x10*3/uL    Basophils Absolute 0.06 0.00 - 0.10 x10*3/uL   Comprehensive metabolic panel   Result Value Ref Range    Glucose 233 (H) 65 - 99 mg/dL    Sodium 141 133 - 145 mmol/L    Potassium 3.0 (L) 3.4 - 5.1 mmol/L    Chloride 105 97 - 107 mmol/L    Bicarbonate 19 (L) 24 - 31 mmol/L    Urea Nitrogen 12 8 - 25 mg/dL    Creatinine 1.10 0.40 - 1.60 mg/dL    eGFR 53 (L) >60 mL/min/1.73m*2    Calcium 8.5 8.5 - 10.4 mg/dL    Albumin 3.5 3.5 - 5.0 g/dL    Alkaline Phosphatase 122 35 - 125 U/L    Total Protein 6.8 5.9 - 7.9 g/dL     (H) 5 - 40 U/L    Bilirubin, Total 0.5 0.1 - 1.2 mg/dL    ALT 91 (H) 5 - 40 U/L    Anion Gap 17 <=19 mmol/L   Ethanol   Result Value Ref Range    Alcohol <0.010 0.000 - 0.010 g/dL   Magnesium   Result Value Ref Range    Magnesium 2.40 1.60 - 3.10 mg/dL   Bilirubin, Direct   Result Value Ref Range    Bilirubin, Direct <0.2 0.0 - 0.2 mg/dL   Serial Troponin, Initial ([facility])   Result Value Ref Range    Troponin T, High Sensitivity 18 (HH) <=14 ng/L   TSH with reflex to Free T4 if abnormal   Result Value Ref Range    Thyroid Stimulating Hormone 2.98 0.27 - 4.20 mIU/L   APTT   Result Value Ref Range    aPTT 25.5 22.0 - 32.5 seconds   Protime-INR   Result Value Ref Range    Protime 11.0 9.3 - 12.7 seconds    INR 1.0 0.9 - 1.2   Type and screen   Result Value Ref Range    ABO TYPE O     Rh TYPE POS     ANTIBODY SCREEN NEG    ECG 12 lead   Result Value Ref Range    Ventricular Rate 93 BPM    Atrial Rate 141 BPM    QRS Duration 122 ms    QT Interval 396 ms    QTC Calculation(Bazett) 492 ms    P Axis 63 degrees    R Axis -45 degrees    T Axis 124 degrees    QRS Count 15 beats    Q Onset 209 ms    T  Offset 407 ms    QTC Fredericia 458 ms   Blood Gas Arterial Full Panel   Result Value Ref Range    POCT pH, Arterial 7.22 (LL) 7.38 - 7.42 pH    POCT pCO2, Arterial 46 (H) 38 - 42 mm Hg    POCT pO2, Arterial 68 (L) 85 - 95 mm Hg    POCT SO2, Arterial 92 (L) 94 - 100 %    POCT Oxy Hemoglobin, Arterial 90.9 (L) 94.0 - 98.0 %    POCT Hematocrit Calculated, Arterial 35.0 (L) 36.0 - 46.0 %    POCT Sodium, Arterial 139 136 - 145 mmol/L    POCT Potassium, Arterial 3.0 (L) 3.5 - 5.3 mmol/L    POCT Chloride, Arterial 106 98 - 107 mmol/L    POCT Ionized Calcium, Arterial 1.10 1.10 - 1.33 mmol/L    POCT Glucose, Arterial 231 (H) 74 - 99 mg/dL    POCT Lactate, Arterial 4.8 (HH) 0.4 - 2.0 mmol/L    POCT Base Excess, Arterial -8.7 (L) -2.0 - 3.0 mmol/L    POCT HCO3 Calculated, Arterial 18.8 (L) 22.0 - 26.0 mmol/L    POCT Hemoglobin, Arterial 11.6 (L) 12.0 - 16.0 g/dL    POCT Anion Gap, Arterial 17 10 - 25 mmo/L    Patient Temperature 37.0 degrees Celsius    FiO2 100 %    Ventilator Mode      Ventilator Rate 14 bpm    Tidal Volume 450 mL    Peep CHM2O 8.0 cm H2O    Site of Arterial Puncture Radial Right     Eric's Test Positive    Drug Screen, Urine   Result Value Ref Range    Amphetamine Screen, Urine Negative      Barbiturate Screen, Urine Negative      Benzodiazepines Screen, Urine Negative      Cannabinoid Screen, Urine Negative      Cocaine Metabolite Screen, Urine Negative      Fentanyl Screen, Urine Negative       Methadone Screen, Urine Negative      Opiate Screen, Urine Negative      Oxycodone Screen, Urine Negative      PCP Screen, Urine Negative     Blood Culture    Specimen: Peripheral Venipuncture; Blood culture   Result Value Ref Range    Blood Culture No growth at 3 days    Blood Culture    Specimen: Peripheral Venipuncture; Blood culture   Result Value Ref Range    Blood Culture No growth at 3 days    Blood Gas Arterial Full Panel   Result Value Ref Range    POCT pH, Arterial 7.24 (LL) 7.38 - 7.42 pH    POCT pCO2,  Arterial 47 (H) 38 - 42 mm Hg    POCT pO2, Arterial 89 85 - 95 mm Hg    POCT SO2, Arterial 97 94 - 100 %    POCT Oxy Hemoglobin, Arterial 95.2 94.0 - 98.0 %    POCT Hematocrit Calculated, Arterial 37.0 36.0 - 46.0 %    POCT Sodium, Arterial 138 136 - 145 mmol/L    POCT Potassium, Arterial 3.4 (L) 3.5 - 5.3 mmol/L    POCT Chloride, Arterial 106 98 - 107 mmol/L    POCT Ionized Calcium, Arterial 1.15 1.10 - 1.33 mmol/L    POCT Glucose, Arterial 222 (H) 74 - 99 mg/dL    POCT Lactate, Arterial 2.9 (H) 0.4 - 2.0 mmol/L    POCT Base Excess, Arterial -7.2 (L) -2.0 - 3.0 mmol/L    POCT HCO3 Calculated, Arterial 20.1 (L) 22.0 - 26.0 mmol/L    POCT Hemoglobin, Arterial 12.3 12.0 - 16.0 g/dL    POCT Anion Gap, Arterial 15 10 - 25 mmo/L    Patient Temperature 37.0 degrees Celsius    FiO2 100 %    Ventilator Mode      Ventilator Rate 16 bpm    Tidal Volume 450 mL    Peep CHM2O 8.0 cm H2O    Site of Arterial Puncture Arterial Line     Eric's Test Positive    ECG 12 lead   Result Value Ref Range    Ventricular Rate 68 BPM    Atrial Rate 57 BPM    MI Interval 218 ms    QRS Duration 116 ms    QT Interval 444 ms    QTC Calculation(Bazett) 472 ms    P Axis 80 degrees    R Axis -40 degrees    T Axis 62 degrees    QRS Count 12 beats    Q Onset 222 ms    P Onset 113 ms    P Offset 158 ms    T Offset 444 ms    QTC Fredericia 463 ms   Transthoracic Echo (TTE) Complete   Result Value Ref Range    BSA 2.18 m2   Blood Gas Arterial Full Panel   Result Value Ref Range    POCT pH, Arterial 7.33 (L) 7.38 - 7.42 pH    POCT pCO2, Arterial 43 (H) 38 - 42 mm Hg    POCT pO2, Arterial 179 (H) 85 - 95 mm Hg    POCT SO2, Arterial 99 94 - 100 %    POCT Oxy Hemoglobin, Arterial 96.9 94.0 - 98.0 %    POCT Hematocrit Calculated, Arterial 38.0 36.0 - 46.0 %    POCT Sodium, Arterial 137 136 - 145 mmol/L    POCT Potassium, Arterial 3.3 (L) 3.5 - 5.3 mmol/L    POCT Chloride, Arterial 106 98 - 107 mmol/L    POCT Ionized Calcium, Arterial 1.15 1.10 - 1.33 mmol/L     POCT Glucose, Arterial 263 (H) 74 - 99 mg/dL    POCT Lactate, Arterial 2.1 (H) 0.4 - 2.0 mmol/L    POCT Base Excess, Arterial -3.2 (L) -2.0 - 3.0 mmol/L    POCT HCO3 Calculated, Arterial 22.7 22.0 - 26.0 mmol/L    POCT Hemoglobin, Arterial 12.6 12.0 - 16.0 g/dL    POCT Anion Gap, Arterial 12 10 - 25 mmo/L    Patient Temperature 37.0 degrees Celsius    FiO2 100 %    Ventilator Mode Other     Ventilator Rate 20 bpm    Tidal Volume 450 mL    Peep CHM2O 8.0 cm H2O    Site of Arterial Puncture Arterial Line     Eric's Test Negative    POCT GLUCOSE   Result Value Ref Range    POCT Glucose 128 (H) 74 - 99 mg/dL   CBC and Auto Differential   Result Value Ref Range    WBC 21.7 (H) 4.4 - 11.3 x10*3/uL    nRBC 0.0 0.0 - 0.0 /100 WBCs    RBC 4.30 4.00 - 5.20 x10*6/uL    Hemoglobin 11.7 (L) 12.0 - 16.0 g/dL    Hematocrit 38.5 36.0 - 46.0 %    MCV 90 80 - 100 fL    MCH 27.2 26.0 - 34.0 pg    MCHC 30.4 (L) 32.0 - 36.0 g/dL    RDW 15.9 (H) 11.5 - 14.5 %    Platelets 229 150 - 450 x10*3/uL    Neutrophils % 89.6 40.0 - 80.0 %    Immature Granulocytes %, Automated 0.6 0.0 - 0.9 %    Lymphocytes % 5.4 13.0 - 44.0 %    Monocytes % 4.2 2.0 - 10.0 %    Eosinophils % 0.0 0.0 - 6.0 %    Basophils % 0.2 0.0 - 2.0 %    Neutrophils Absolute 19.45 (H) 1.60 - 5.50 x10*3/uL    Immature Granulocytes Absolute, Automated 0.13 0.00 - 0.50 x10*3/uL    Lymphocytes Absolute 1.18 0.80 - 3.00 x10*3/uL    Monocytes Absolute 0.92 (H) 0.05 - 0.80 x10*3/uL    Eosinophils Absolute 0.01 0.00 - 0.40 x10*3/uL    Basophils Absolute 0.04 0.00 - 0.10 x10*3/uL   Magnesium   Result Value Ref Range    Magnesium 2.50 1.60 - 3.10 mg/dL   Renal function panel   Result Value Ref Range    Glucose 157 (H) 65 - 99 mg/dL    Sodium 136 133 - 145 mmol/L    Potassium 3.9 3.4 - 5.1 mmol/L    Chloride 104 97 - 107 mmol/L    Bicarbonate 23 (L) 24 - 31 mmol/L    Urea Nitrogen 14 8 - 25 mg/dL    Creatinine 1.30 0.40 - 1.60 mg/dL    eGFR 44 (L) >60 mL/min/1.73m*2    Calcium 8.3 (L)  8.5 - 10.4 mg/dL    Phosphorus 2.2 (L) 2.5 - 4.5 mg/dL    Albumin 3.2 (L) 3.5 - 5.0 g/dL    Anion Gap 9 <=19 mmol/L   MRSA Surveillance for Vancomycin De-escalation, PCR    Specimen: Anterior Nares; Swab   Result Value Ref Range    MRSA PCR Not Detected Not Detected   POCT GLUCOSE   Result Value Ref Range    POCT Glucose 124 (H) 74 - 99 mg/dL   POCT GLUCOSE   Result Value Ref Range    POCT Glucose 142 (H) 74 - 99 mg/dL   Vancomycin   Result Value Ref Range    Vancomycin 15.7 10.0 - 20.0 ug/mL   Basic metabolic panel   Result Value Ref Range    Glucose 160 (H) 65 - 99 mg/dL    Sodium 139 133 - 145 mmol/L    Potassium 4.4 3.4 - 5.1 mmol/L    Chloride 107 97 - 107 mmol/L    Bicarbonate 23 (L) 24 - 31 mmol/L    Urea Nitrogen 16 8 - 25 mg/dL    Creatinine 1.50 0.40 - 1.60 mg/dL    eGFR 37 (L) >60 mL/min/1.73m*2    Calcium 8.4 (L) 8.5 - 10.4 mg/dL    Anion Gap 9 <=19 mmol/L   CBC and Auto Differential   Result Value Ref Range    WBC 21.4 (H) 4.4 - 11.3 x10*3/uL    nRBC 0.0 0.0 - 0.0 /100 WBCs    RBC 4.22 4.00 - 5.20 x10*6/uL    Hemoglobin 11.8 (L) 12.0 - 16.0 g/dL    Hematocrit 37.5 36.0 - 46.0 %    MCV 89 80 - 100 fL    MCH 28.0 26.0 - 34.0 pg    MCHC 31.5 (L) 32.0 - 36.0 g/dL    RDW 16.0 (H) 11.5 - 14.5 %    Platelets 232 150 - 450 x10*3/uL    Neutrophils % 90.1 40.0 - 80.0 %    Immature Granulocytes %, Automated 0.8 0.0 - 0.9 %    Lymphocytes % 4.9 13.0 - 44.0 %    Monocytes % 4.0 2.0 - 10.0 %    Eosinophils % 0.0 0.0 - 6.0 %    Basophils % 0.2 0.0 - 2.0 %    Neutrophils Absolute 19.28 (H) 1.60 - 5.50 x10*3/uL    Immature Granulocytes Absolute, Automated 0.18 0.00 - 0.50 x10*3/uL    Lymphocytes Absolute 1.05 0.80 - 3.00 x10*3/uL    Monocytes Absolute 0.85 (H) 0.05 - 0.80 x10*3/uL    Eosinophils Absolute 0.00 0.00 - 0.40 x10*3/uL    Basophils Absolute 0.05 0.00 - 0.10 x10*3/uL   Magnesium   Result Value Ref Range    Magnesium 2.40 1.60 - 3.10 mg/dL   Phosphorus   Result Value Ref Range    Phosphorus 3.3 2.5 - 4.5 mg/dL    Troponin T   Result Value Ref Range    Troponin T, High Sensitivity 78 (HH) <=14 ng/L   POCT GLUCOSE   Result Value Ref Range    POCT Glucose 136 (H) 74 - 99 mg/dL   Blood Gas Arterial Full Panel   Result Value Ref Range    POCT pH, Arterial 7.40 7.38 - 7.42 pH    POCT pCO2, Arterial 36 (L) 38 - 42 mm Hg    POCT pO2, Arterial 109 (H) 85 - 95 mm Hg    POCT SO2, Arterial 98 94 - 100 %    POCT Oxy Hemoglobin, Arterial 96.5 94.0 - 98.0 %    POCT Hematocrit Calculated, Arterial 36.0 36.0 - 46.0 %    POCT Sodium, Arterial 135 (L) 136 - 145 mmol/L    POCT Potassium, Arterial 4.2 3.5 - 5.3 mmol/L    POCT Chloride, Arterial 109 (H) 98 - 107 mmol/L    POCT Ionized Calcium, Arterial 1.17 1.10 - 1.33 mmol/L    POCT Glucose, Arterial 162 (H) 74 - 99 mg/dL    POCT Lactate, Arterial 1.2 0.4 - 2.0 mmol/L    POCT Base Excess, Arterial -2.1 (L) -2.0 - 3.0 mmol/L    POCT HCO3 Calculated, Arterial 22.3 22.0 - 26.0 mmol/L    POCT Hemoglobin, Arterial 12.0 12.0 - 16.0 g/dL    POCT Anion Gap, Arterial 8 (L) 10 - 25 mmo/L    Patient Temperature 37.0 degrees Celsius    FiO2 50 %    Apparatus      Ventilator Mode      Ventilator Rate 22 bpm    Tidal Volume 450 mL    Peep CHM2O 8.0 cm H2O    Site of Arterial Puncture Brachial Left     Eric's Test Positive    POCT GLUCOSE   Result Value Ref Range    POCT Glucose 119 (H) 74 - 99 mg/dL   Blood Gas Venous Full Panel   Result Value Ref Range    POCT pH, Venous 7.34 7.33 - 7.43 pH    POCT pCO2, Venous 43 41 - 51 mm Hg    POCT pO2, Venous 45 35 - 45 mm Hg    POCT SO2, Venous 73 45 - 75 %    POCT Oxy Hemoglobin, Venous 72.0 45.0 - 75.0 %    POCT Hematocrit Calculated, Venous 36.0 36.0 - 46.0 %    POCT Sodium, Venous 138 136 - 145 mmol/L    POCT Potassium, Venous 3.9 3.5 - 5.3 mmol/L    POCT Chloride, Venous 106 98 - 107 mmol/L    POCT Ionized Calicum, Venous 1.20 1.10 - 1.33 mmol/L    POCT Glucose, Venous 122 (H) 74 - 99 mg/dL    POCT Lactate, Venous 1.4 0.4 - 2.0 mmol/L    POCT Base Excess,  Venous -2.6 (L) -2.0 - 3.0 mmol/L    POCT HCO3 Calculated, Venous 23.2 22.0 - 26.0 mmol/L    POCT Hemoglobin, Venous 12.0 12.0 - 16.0 g/dL    POCT Anion Gap, Venous 13.0 10.0 - 25.0 mmol/L    Patient Temperature 37.0 degrees Celsius    FiO2 40 %    Ventilator Mode CPAP     Ventilator Rate 23 bpm    Tidal Volume 445 mL    Spontaneous Tidal Volume 463 mL    Peep CHM2O 5.0 cm H2O    Pressure Support 5 cm H2O   POCT GLUCOSE   Result Value Ref Range    POCT Glucose 89 74 - 99 mg/dL   Troponin T   Result Value Ref Range    Troponin T, High Sensitivity 47 (HH) <=14 ng/L   Vitamin B12   Result Value Ref Range    Vitamin B12 627 211 - 946 pg/mL   Vitamin D 25-Hydroxy,Total (for eval of Vitamin D levels)   Result Value Ref Range    Vitamin D, 25-Hydroxy, Total 17 (L) 31 - 100 ng/mL   Blood Gas Venous Full Panel   Result Value Ref Range    POCT pH, Venous 7.24 (LL) 7.33 - 7.43 pH    POCT pCO2, Venous 57 (H) 41 - 51 mm Hg    POCT pO2, Venous 48 (H) 35 - 45 mm Hg    POCT SO2, Venous 75 45 - 75 %    POCT Oxy Hemoglobin, Venous 72.9 45.0 - 75.0 %    POCT Hematocrit Calculated, Venous 37.0 36.0 - 46.0 %    POCT Sodium, Venous 137 136 - 145 mmol/L    POCT Potassium, Venous 3.9 3.5 - 5.3 mmol/L    POCT Chloride, Venous 106 98 - 107 mmol/L    POCT Ionized Calicum, Venous 1.25 1.10 - 1.33 mmol/L    POCT Glucose, Venous 120 (H) 74 - 99 mg/dL    POCT Lactate, Venous 1.8 0.4 - 2.0 mmol/L    POCT Base Excess, Venous -3.7 (L) -2.0 - 3.0 mmol/L    POCT HCO3 Calculated, Venous 24.4 22.0 - 26.0 mmol/L    POCT Hemoglobin, Venous 12.4 12.0 - 16.0 g/dL    POCT Anion Gap, Venous 11.0 10.0 - 25.0 mmol/L    Patient Temperature 37.0 degrees Celsius    FiO2 40 %    Ventilator Mode CPAP     Ventilator Rate 35 bpm    Spontaneous Tidal Volume 343 mL    PC Pressure Control 5.0 cm H2O    Peep CHM2O 5.0 cm H2O   POCT GLUCOSE   Result Value Ref Range    POCT Glucose 99 74 - 99 mg/dL   POCT GLUCOSE   Result Value Ref Range    POCT Glucose 97 74 - 99 mg/dL    POCT GLUCOSE   Result Value Ref Range    POCT Glucose 109 (H) 74 - 99 mg/dL   BLOOD GAS ARTERIAL FULL PANEL   Result Value Ref Range    POCT pH, Arterial 7.43 (H) 7.38 - 7.42 pH    POCT pCO2, Arterial 33 (L) 38 - 42 mm Hg    POCT pO2, Arterial 96 (H) 85 - 95 mm Hg    POCT SO2, Arterial 98 94 - 100 %    POCT Oxy Hemoglobin, Arterial 95.9 94.0 - 98.0 %    POCT Hematocrit Calculated, Arterial 32.0 (L) 36.0 - 46.0 %    POCT Sodium, Arterial 134 (L) 136 - 145 mmol/L    POCT Potassium, Arterial 3.8 3.5 - 5.3 mmol/L    POCT Chloride, Arterial 109 (H) 98 - 107 mmol/L    POCT Ionized Calcium, Arterial 1.17 1.10 - 1.33 mmol/L    POCT Glucose, Arterial 103 (H) 74 - 99 mg/dL    POCT Lactate, Arterial 1.0 0.4 - 2.0 mmol/L    POCT Base Excess, Arterial -1.9 -2.0 - 3.0 mmol/L    POCT HCO3 Calculated, Arterial 21.9 (L) 22.0 - 26.0 mmol/L    POCT Hemoglobin, Arterial 10.7 (L) 12.0 - 16.0 g/dL    POCT Anion Gap, Arterial 7 (L) 10 - 25 mmo/L    Patient Temperature 37.0 degrees Celsius    FiO2 40 %    Apparatus      Ventilator Mode      Ventilator Rate 20 bpm    Tidal Volume 450 mL    Peep CHM2O 5.0 cm H2O    Site of Arterial Puncture Radial Right     Eric's Test Positive    Basic metabolic panel   Result Value Ref Range    Glucose 113 (H) 65 - 99 mg/dL    Sodium 138 133 - 145 mmol/L    Potassium 4.1 3.4 - 5.1 mmol/L    Chloride 105 97 - 107 mmol/L    Bicarbonate 23 (L) 24 - 31 mmol/L    Urea Nitrogen 19 8 - 25 mg/dL    Creatinine 1.80 (H) 0.40 - 1.60 mg/dL    eGFR 29 (L) >60 mL/min/1.73m*2    Calcium 8.4 (L) 8.5 - 10.4 mg/dL    Anion Gap 10 <=19 mmol/L   CBC and Auto Differential   Result Value Ref Range    WBC 18.0 (H) 4.4 - 11.3 x10*3/uL    nRBC 0.0 0.0 - 0.0 /100 WBCs    RBC 3.73 (L) 4.00 - 5.20 x10*6/uL    Hemoglobin 10.2 (L) 12.0 - 16.0 g/dL    Hematocrit 32.7 (L) 36.0 - 46.0 %    MCV 88 80 - 100 fL    MCH 27.3 26.0 - 34.0 pg    MCHC 31.2 (L) 32.0 - 36.0 g/dL    RDW 16.1 (H) 11.5 - 14.5 %    Platelets 163 150 - 450 x10*3/uL     Neutrophils % 89.5 40.0 - 80.0 %    Immature Granulocytes %, Automated 0.4 0.0 - 0.9 %    Lymphocytes % 6.4 13.0 - 44.0 %    Monocytes % 3.4 2.0 - 10.0 %    Eosinophils % 0.1 0.0 - 6.0 %    Basophils % 0.2 0.0 - 2.0 %    Neutrophils Absolute 16.10 (H) 1.60 - 5.50 x10*3/uL    Immature Granulocytes Absolute, Automated 0.08 0.00 - 0.50 x10*3/uL    Lymphocytes Absolute 1.15 0.80 - 3.00 x10*3/uL    Monocytes Absolute 0.61 0.05 - 0.80 x10*3/uL    Eosinophils Absolute 0.01 0.00 - 0.40 x10*3/uL    Basophils Absolute 0.03 0.00 - 0.10 x10*3/uL   Magnesium   Result Value Ref Range    Magnesium 2.20 1.60 - 3.10 mg/dL   Phosphorus   Result Value Ref Range    Phosphorus 4.3 2.5 - 4.5 mg/dL   Vancomycin   Result Value Ref Range    Vancomycin 21.8 (H) 10.0 - 20.0 ug/mL   Calcium, ionized   Result Value Ref Range    POCT Calcium, Ionized 1.13 1.1 - 1.33 mmol/L   POCT GLUCOSE   Result Value Ref Range    POCT Glucose 113 (H) 74 - 99 mg/dL   POCT GLUCOSE   Result Value Ref Range    POCT Glucose 87 74 - 99 mg/dL   aPTT   Result Value Ref Range    aPTT 30.8 22.0 - 32.5 seconds   CBC   Result Value Ref Range    WBC 17.4 (H) 4.4 - 11.3 x10*3/uL    nRBC 0.0 0.0 - 0.0 /100 WBCs    RBC 3.76 (L) 4.00 - 5.20 x10*6/uL    Hemoglobin 10.3 (L) 12.0 - 16.0 g/dL    Hematocrit 33.1 (L) 36.0 - 46.0 %    MCV 88 80 - 100 fL    MCH 27.4 26.0 - 34.0 pg    MCHC 31.1 (L) 32.0 - 36.0 g/dL    RDW 16.2 (H) 11.5 - 14.5 %    Platelets 165 150 - 450 x10*3/uL   POCT GLUCOSE   Result Value Ref Range    POCT Glucose 87 74 - 99 mg/dL   POCT GLUCOSE   Result Value Ref Range    POCT Glucose 92 74 - 99 mg/dL   aPTT   Result Value Ref Range    aPTT 73.8 (H) 22.0 - 32.5 seconds   POCT GLUCOSE   Result Value Ref Range    POCT Glucose 87 74 - 99 mg/dL   POCT GLUCOSE   Result Value Ref Range    POCT Glucose 72 (L) 74 - 99 mg/dL   Basic metabolic panel   Result Value Ref Range    Glucose 91 65 - 99 mg/dL    Sodium 140 133 - 145 mmol/L    Potassium 3.2 (L) 3.4 - 5.1 mmol/L     Chloride 104 97 - 107 mmol/L    Bicarbonate 24 24 - 31 mmol/L    Urea Nitrogen 16 8 - 25 mg/dL    Creatinine 1.30 0.40 - 1.60 mg/dL    eGFR 44 (L) >60 mL/min/1.73m*2    Calcium 8.5 8.5 - 10.4 mg/dL    Anion Gap 12 <=19 mmol/L   CBC and Auto Differential   Result Value Ref Range    WBC 16.0 (H) 4.4 - 11.3 x10*3/uL    nRBC 0.0 0.0 - 0.0 /100 WBCs    RBC 3.62 (L) 4.00 - 5.20 x10*6/uL    Hemoglobin 10.1 (L) 12.0 - 16.0 g/dL    Hematocrit 31.6 (L) 36.0 - 46.0 %    MCV 87 80 - 100 fL    MCH 27.9 26.0 - 34.0 pg    MCHC 32.0 32.0 - 36.0 g/dL    RDW 15.9 (H) 11.5 - 14.5 %    Platelets 164 150 - 450 x10*3/uL    Neutrophils % 87.1 40.0 - 80.0 %    Immature Granulocytes %, Automated 0.5 0.0 - 0.9 %    Lymphocytes % 7.3 13.0 - 44.0 %    Monocytes % 4.6 2.0 - 10.0 %    Eosinophils % 0.2 0.0 - 6.0 %    Basophils % 0.3 0.0 - 2.0 %    Neutrophils Absolute 13.93 (H) 1.60 - 5.50 x10*3/uL    Immature Granulocytes Absolute, Automated 0.08 0.00 - 0.50 x10*3/uL    Lymphocytes Absolute 1.16 0.80 - 3.00 x10*3/uL    Monocytes Absolute 0.74 0.05 - 0.80 x10*3/uL    Eosinophils Absolute 0.03 0.00 - 0.40 x10*3/uL    Basophils Absolute 0.05 0.00 - 0.10 x10*3/uL   Magnesium   Result Value Ref Range    Magnesium 2.00 1.60 - 3.10 mg/dL   Phosphorus   Result Value Ref Range    Phosphorus 2.8 2.5 - 4.5 mg/dL   aPTT   Result Value Ref Range    aPTT 48.1 (H) 22.0 - 32.5 seconds   POCT GLUCOSE   Result Value Ref Range    POCT Glucose 130 (H) 74 - 99 mg/dL   POCT GLUCOSE   Result Value Ref Range    POCT Glucose 82 74 - 99 mg/dL   POCT GLUCOSE   Result Value Ref Range    POCT Glucose 88 74 - 99 mg/dL   POCT GLUCOSE   Result Value Ref Range    POCT Glucose 118 (H) 74 - 99 mg/dL   Potassium   Result Value Ref Range    Potassium 3.7 3.4 - 5.1 mmol/L   POCT GLUCOSE   Result Value Ref Range    POCT Glucose 96 74 - 99 mg/dL          ECG 12 lead    Result Date: 7/22/2024  Sinus bradycardia with 1st degree AV block with occasional Premature ventricular complexes  and Premature atrial complexes Left axis deviation Abnormal ECG No previous ECGs available Confirmed by Juan Bland ([id number]) on 7/22/2024 10:22:48 AM    ECG 12 lead    Result Date: 7/22/2024  Sinus tachycardia with 2nd degree AV block (Mobitz I) with occasional Premature ventricular complexes Left axis deviation Abnormal ECG No previous ECGs available Confirmed by Juan Bland ([id number]) on 7/22/2024 10:22:13 AM    XR chest 1 view    Result Date: 7/21/2024  Interpreted By:  Ailyn Gonzalez, STUDY: XR CHEST 1 VIEW;  7/21/2024 6:10 am   INDICATION: Signs/Symptoms:Intubated patient in ICU, comparison.   COMPARISON: 07/1924   ACCESSION NUMBER(S): [id number]   ORDERING CLINICIAN: SHRUTI SONI   FINDINGS: CARDIOMEDIASTINAL SILHOUETTE: The heart is enlarged but unchanged. There is an endotracheal tube with the tip 5.4 cm above the kolby. There is a right internal jugular central venous catheter with the tip in the superior vena cava.   LUNGS: There are bilateral patchy alveolar opacities with dense consolidation at the left lung base and obscuration of the left hemidiaphragm. This could represent pulmonary edema or pneumonia. This has improved.   ABDOMEN: No remarkable upper abdominal findings. There is a nasogastric tube with the distal tip not included but the tip and side-hole are beyond the gastroesophageal junction.   BONES: No acute osseous changes.       1. Satisfactory position endotracheal tube and right internal jugular central venous catheter. 2. Bilateral patchy alveolar opacities consistent with pneumonia or pulmonary edema; there has been improvement.   MACRO: None   Signed by: Ailyn Gonzalez 7/21/2024 12:03 PM Dictation workstation:   [id number]    MR brain wo IV contrast    Result Date: 7/20/2024  Interpreted By:  Mariano Varela, STUDY: MR BRAIN WO IV CONTRAST;  7/20/2024 3:54 pm   INDICATION: Signs/Symptoms:Acute stroke protocol.   COMPARISON: None.   ACCESSION NUMBER(S): [id number]   ORDERING  CLINICIAN: ANABELA CALLAWAY   TECHNIQUE: Standard multiplanar multisequence MR imaging was performed through the brain without intravenous contrast. Axial T2, FLAIR, DWI, gradient echo T2 and sagittal and coronal T1 weighted images of brain were acquired.  Motion artifact degrades assessment on several sequences.   FINDINGS: Parenchyma: There is no diffusion restriction abnormality to suggest acute infarct.  No evidence of recent hemorrhage when allowing for significant motion degradation on GRE sequence. There is no mass effect or midline shift. There are patchy as well as confluent T2/FLAIR white matter hyperintensities within the bilateral cerebral hemispheres, favor chronic small vessel ischemic disease, with further assessment limited by motion artifact.   CSF Spaces: Nonspecific partial empty sella. The ventricles, sulci and basal cisterns are grossly within normal limits for age with aforementioned motion artifact limiting evaluation on multiple sequences. Basilar cisterns are patent.   Extra-axial spaces: No extra-axial fluid collection.   Paranasal Sinuses: Probable mucosal thickening, particularly involving the left maxillary paranasal sinus, not well assessed.   Mastoids: Grossly clear.   Orbits: Not well assessed.   Calvarium: No suspicious osseous marrow signal.       Examination is degraded by varying degrees of motion artifact. No evidence of acute infarct or intracranial mass effect. Probable mild-to-moderate chronic small vessel ischemic disease.   MACRO: None   Signed by: Mariano Varela 7/20/2024 4:23 PM Dictation workstation:   [id number]    CT head wo IV contrast    Result Date: 7/20/2024  Interpreted By:  Mariano Varela, STUDY: CT HEAD WO IV CONTRAST;  7/19/2024 11:00 pm   INDICATION: Signs/Symptoms:cerebral edema.   COMPARISON: CT head dated 07/19/2024 at 14:52   ACCESSION NUMBER(S): [id number]   ORDERING CLINICIAN: ZULEMA MICHAEL   TECHNIQUE: Noncontrast axial CT scan of head was  performed.   FINDINGS: Parenchyma: There is no intracranial hemorrhage. The grey-white differentiation is intact. There is no mass effect or midline shift. Moderate chronic small vessel ischemic disease suggested with patchy as well as confluent white matter hypodensities within the bilateral cerebral hemispheres. There is suspected small focus of encephalomalacia within the right frontal opercular region corresponding to previous abnormality (series 9, image 72 and series 3, image 44).   CSF Spaces: Nonspecific partial empty sella. The ventricles, sulci and basal cisterns are within normal limits for age with mild generalized brain atrophy. No CT apparent cerebral edema.   Extra-Axial Fluid: There is no extraaxial fluid collection.   Calvarium: The calvarium is unremarkable.   Paranasal sinuses: Extensive paranasal sinus disease with near-complete opacification of the left frontal sinus. There is scattered near-complete and partial opacification of numerous bilateral ethmoidal air cells. There is dependent fluid/secretions with partial opacification of the sphenoid sinuses bilaterally. There is complete opacification of the left maxillary paranasal sinus as well as dependent secretions and mucosal thickening within the right maxillary paranasal sinus. There is hyperostosis reflecting chronic sinusitis, most pronounced within the maxillary paranasal sinuses bilaterally. There are additional secretions opacifying the nasal cavity and visualized nasopharynx.   Mastoids: Clear.   Orbits: Normal.   Soft tissues: Unremarkable.       No acute intracranial hemorrhage, mass effect, or CT apparent acute infarct. Specifically, there is no CT evidence of cerebral edema with mild senescent change. Area of concern within the cortical/subcortical aspect of the right frontal operculum is favored to represent encephalomalacia from prior insult. This could be further characterized with MRI brain as clinically warranted.   Additional  moderate chronic small vessel ischemic disease.   Severe chronic paranasal sinus disease as described in detail above.   MACRO: None   Signed by: Mariano Varela 7/20/2024 3:11 PM Dictation workstation:   [id number]    XR chest 1 view    Result Date: 7/19/2024  Interpreted By:  Katie Gilliam, STUDY: XR CHEST 1 VIEW 7/19/2024 3:36 pm   INDICATION: Triple-lumen catheter placement   COMPARISON: 07/19/2024 done at 1:17 p.m.   ACCESSION NUMBER(S): [id number]   ORDERING CLINICIAN: ZULEMA MICHAEL   TECHNIQUE: Recumbent view of the chest at bedside   FINDINGS: A right jugular CVP line has been placed with tip terminating in the SVC. There is no pneumothorax.   Endotracheal tube remains satisfactorily positioned with tip 4.8 cm above kolby. Nasogastric tube descends below diaphragm.   The cardiac size is stable. Bilateral pulmonary infiltrates are once again observed and appear a little worse on the left but stable on the right.       Right jugular CVP line terminating in SVC without pneumothorax.   Bilateral pulmonary infiltrates stable on the right but worsening on the left.   Signed by: Katie Gilliam 7/19/2024 3:58 PM Dictation workstation:   [id number]    CT head wo IV contrast    Result Date: 7/19/2024  Interpreted By:  Cristal Sullivan, STUDY: CT HEAD WO IV CONTRAST;  7/19/2024 2:52 pm   INDICATION: Signs/Symptoms:AMS, OSH cardiac arrest.   COMPARISON: None.   ACCESSION NUMBER(S): [id number]   ORDERING CLINICIAN: FLETCHER ABREU   TECHNIQUE: Noncontrast axial CT scan of head was performed.   FINDINGS: Parenchyma: No intracranial hemorrhage. No midline shift. Small hypodensity in the right frontal lobe with blurring of gray-white junction (series 10, image 26). There is diffuse sulcal effacement along the right cerebral hemisphere relative to the left.   CSF Spaces: The ventricles, sulci and basal cisterns are within normal limits for age.   Extra-Axial Fluid: None.   Calvarium: No acute fracture.   Paranasal  sinuses: Mucosal thickening of left frontal sinus. Near-complete opacification of ethmoid air cells and complete opacification of left maxillary sinus. Small air-fluid level in the right maxillary sinus.   Mastoids: Clear.   Orbits: No acute abnormality.   Soft tissues: No acute abnormality.       Small right frontal lobe hypodensity with blurring of gray-white junction, concerning for possible acute infarct.   Diffuse sulcal effacement along the right cerebral hemisphere relative to the left suggesting cerebral edema. No midline shift, ventricular or sulcal effacement.   MACRO Cristal Sullivan discussed the significance and urgency of this critical finding by Epic secure chat with  FLETCHER ABREU on 7/19/2024 at 3:29 pm.  (**-RCF-**) Findings:  See findings.     Signed by: Cristal Sullivan 7/19/2024 3:30 PM Dictation workstation:   [id number]    XR chest 1 view    Result Date: 7/19/2024  Interpreted By:  Dario Khalil, STUDY: XR CHEST 1 VIEW;  7/19/2024 1:27 pm   INDICATION: Signs/Symptoms:Post intubation.   COMPARISON: None.   ACCESSION NUMBER(S): [id number]   ORDERING CLINICIAN: FLETCHER ABREU   FINDINGS: ET tube 2.4 cm above the kolby. Feeding tube tip passes below the GE junction   CARDIOMEDIASTINAL SILHOUETTE: Cardiomediastinal silhouette is normal in size and configuration.   LUNGS: Extensive dense airspace disease at the lungs especially in the right upper lobe. A component of mild edema is present.   ABDOMEN: No remarkable upper abdominal findings.   BONES: No acute osseous changes.       1.  Extensive bilateral multifocal airspace disease worse at the right upper lung. Underlying pneumonia or atelectasis in the differential. 2. ET tube and feeding tube in place.       MACRO: None   Signed by: Dario Khalil 7/19/2024 1:41 PM Dictation workstation:   [id number]       Assessment/Plan   Principal Problem:    Cardiac arrest (Multi)    73-year-old woman with presumed hypoxic brain injury in the  setting of cardiac arrest is awaiting an EEG to rule out the less likely possibility of seizure.  EEG tech was off today.  We will follow-up EEG tomorrow by Dr. Butler who will take over the service in the morning.       I personally spent 25 minutes today, exclusive of procedures, providing care for this patient, including preparation, face to face time, documentation and other services such as review of medical records, diagnostic result, patient education, counseling, coordination of care as specified in the encounter.

## 2024-07-24 LAB
ALBUMIN SERPL-MCNC: 3.2 G/DL (ref 3.5–5)
ANION GAP SERPL CALC-SCNC: 12 MMOL/L
ANION GAP SERPL CALC-SCNC: 9 MMOL/L
BASOPHILS # BLD AUTO: 0.04 X10*3/UL (ref 0–0.1)
BASOPHILS NFR BLD AUTO: 0.3 %
BUN SERPL-MCNC: 16 MG/DL (ref 8–25)
BUN SERPL-MCNC: 19 MG/DL (ref 8–25)
CALCIUM SERPL-MCNC: 8.5 MG/DL (ref 8.5–10.4)
CALCIUM SERPL-MCNC: 8.8 MG/DL (ref 8.5–10.4)
CHLORIDE SERPL-SCNC: 102 MMOL/L (ref 97–107)
CHLORIDE SERPL-SCNC: 103 MMOL/L (ref 97–107)
CO2 SERPL-SCNC: 24 MMOL/L (ref 24–31)
CO2 SERPL-SCNC: 26 MMOL/L (ref 24–31)
CREAT SERPL-MCNC: 1.1 MG/DL (ref 0.4–1.6)
CREAT SERPL-MCNC: 1.2 MG/DL (ref 0.4–1.6)
EGFRCR SERPLBLD CKD-EPI 2021: 48 ML/MIN/1.73M*2
EGFRCR SERPLBLD CKD-EPI 2021: 53 ML/MIN/1.73M*2
EOSINOPHIL # BLD AUTO: 0.1 X10*3/UL (ref 0–0.4)
EOSINOPHIL NFR BLD AUTO: 0.8 %
ERYTHROCYTE [DISTWIDTH] IN BLOOD BY AUTOMATED COUNT: 15.8 % (ref 11.5–14.5)
GLUCOSE BLD MANUAL STRIP-MCNC: 88 MG/DL (ref 74–99)
GLUCOSE SERPL-MCNC: 137 MG/DL (ref 65–99)
GLUCOSE SERPL-MCNC: 86 MG/DL (ref 65–99)
HCT VFR BLD AUTO: 34 % (ref 36–46)
HGB BLD-MCNC: 10.8 G/DL (ref 12–16)
IMM GRANULOCYTES # BLD AUTO: 0.07 X10*3/UL (ref 0–0.5)
IMM GRANULOCYTES NFR BLD AUTO: 0.5 % (ref 0–0.9)
LYMPHOCYTES # BLD AUTO: 1.12 X10*3/UL (ref 0.8–3)
LYMPHOCYTES NFR BLD AUTO: 8.5 %
MAGNESIUM SERPL-MCNC: 2 MG/DL (ref 1.6–3.1)
MCH RBC QN AUTO: 27.8 PG (ref 26–34)
MCHC RBC AUTO-ENTMCNC: 31.8 G/DL (ref 32–36)
MCV RBC AUTO: 88 FL (ref 80–100)
MONOCYTES # BLD AUTO: 0.96 X10*3/UL (ref 0.05–0.8)
MONOCYTES NFR BLD AUTO: 7.3 %
NEUTROPHILS # BLD AUTO: 10.83 X10*3/UL (ref 1.6–5.5)
NEUTROPHILS NFR BLD AUTO: 82.6 %
NRBC BLD-RTO: 0 /100 WBCS (ref 0–0)
PHOSPHATE SERPL-MCNC: 2.6 MG/DL (ref 2.5–4.5)
PHOSPHATE SERPL-MCNC: 3.5 MG/DL (ref 2.5–4.5)
PLATELET # BLD AUTO: 180 X10*3/UL (ref 150–450)
POTASSIUM SERPL-SCNC: 3.3 MMOL/L (ref 3.4–5.1)
POTASSIUM SERPL-SCNC: 3.9 MMOL/L (ref 3.4–5.1)
RBC # BLD AUTO: 3.88 X10*6/UL (ref 4–5.2)
SODIUM SERPL-SCNC: 138 MMOL/L (ref 133–145)
SODIUM SERPL-SCNC: 138 MMOL/L (ref 133–145)
WBC # BLD AUTO: 13.1 X10*3/UL (ref 4.4–11.3)

## 2024-07-24 PROCEDURE — 80069 RENAL FUNCTION PANEL: CPT

## 2024-07-24 PROCEDURE — 80048 BASIC METABOLIC PNL TOTAL CA: CPT | Mod: CCI | Performed by: STUDENT IN AN ORGANIZED HEALTH CARE EDUCATION/TRAINING PROGRAM

## 2024-07-24 PROCEDURE — 37799 UNLISTED PX VASCULAR SURGERY: CPT

## 2024-07-24 PROCEDURE — 2500000001 HC RX 250 WO HCPCS SELF ADMINISTERED DRUGS (ALT 637 FOR MEDICARE OP): Performed by: STUDENT IN AN ORGANIZED HEALTH CARE EDUCATION/TRAINING PROGRAM

## 2024-07-24 PROCEDURE — 80048 BASIC METABOLIC PNL TOTAL CA: CPT

## 2024-07-24 PROCEDURE — 99233 SBSQ HOSP IP/OBS HIGH 50: CPT | Performed by: STUDENT IN AN ORGANIZED HEALTH CARE EDUCATION/TRAINING PROGRAM

## 2024-07-24 PROCEDURE — 37799 UNLISTED PX VASCULAR SURGERY: CPT | Performed by: STUDENT IN AN ORGANIZED HEALTH CARE EDUCATION/TRAINING PROGRAM

## 2024-07-24 PROCEDURE — 97530 THERAPEUTIC ACTIVITIES: CPT | Mod: GP

## 2024-07-24 PROCEDURE — 97110 THERAPEUTIC EXERCISES: CPT | Mod: GP

## 2024-07-24 PROCEDURE — 85025 COMPLETE CBC W/AUTO DIFF WBC: CPT

## 2024-07-24 PROCEDURE — 2500000004 HC RX 250 GENERAL PHARMACY W/ HCPCS (ALT 636 FOR OP/ED): Performed by: STUDENT IN AN ORGANIZED HEALTH CARE EDUCATION/TRAINING PROGRAM

## 2024-07-24 PROCEDURE — 2060000001 HC INTERMEDIATE ICU ROOM DAILY

## 2024-07-24 PROCEDURE — 2500000004 HC RX 250 GENERAL PHARMACY W/ HCPCS (ALT 636 FOR OP/ED): Performed by: NURSE PRACTITIONER

## 2024-07-24 PROCEDURE — 2500000005 HC RX 250 GENERAL PHARMACY W/O HCPCS: Performed by: INTERNAL MEDICINE

## 2024-07-24 PROCEDURE — 82947 ASSAY GLUCOSE BLOOD QUANT: CPT

## 2024-07-24 PROCEDURE — 97535 SELF CARE MNGMENT TRAINING: CPT | Mod: GO,CO

## 2024-07-24 PROCEDURE — 2500000004 HC RX 250 GENERAL PHARMACY W/ HCPCS (ALT 636 FOR OP/ED)

## 2024-07-24 PROCEDURE — 92526 ORAL FUNCTION THERAPY: CPT | Mod: GN | Performed by: SPEECH-LANGUAGE PATHOLOGIST

## 2024-07-24 PROCEDURE — 2500000001 HC RX 250 WO HCPCS SELF ADMINISTERED DRUGS (ALT 637 FOR MEDICARE OP): Performed by: NURSE PRACTITIONER

## 2024-07-24 PROCEDURE — 97530 THERAPEUTIC ACTIVITIES: CPT | Mod: GO,CO

## 2024-07-24 PROCEDURE — 83735 ASSAY OF MAGNESIUM: CPT

## 2024-07-24 PROCEDURE — 84100 ASSAY OF PHOSPHORUS: CPT

## 2024-07-24 RX ORDER — ALLOPURINOL 300 MG/1
300 TABLET ORAL DAILY
Status: DISCONTINUED | OUTPATIENT
Start: 2024-07-24 | End: 2024-08-02 | Stop reason: HOSPADM

## 2024-07-24 RX ORDER — POTASSIUM CHLORIDE 14.9 MG/ML
20 INJECTION INTRAVENOUS
Status: COMPLETED | OUTPATIENT
Start: 2024-07-24 | End: 2024-07-24

## 2024-07-24 RX ORDER — QUETIAPINE FUMARATE 25 MG/1
25 TABLET, FILM COATED ORAL NIGHTLY PRN
Status: DISCONTINUED | OUTPATIENT
Start: 2024-07-24 | End: 2024-07-25

## 2024-07-24 RX ORDER — METOPROLOL TARTRATE 25 MG/1
25 TABLET, FILM COATED ORAL 2 TIMES DAILY
Status: DISCONTINUED | OUTPATIENT
Start: 2024-07-24 | End: 2024-07-25

## 2024-07-24 RX ORDER — AMLODIPINE BESYLATE 5 MG/1
5 TABLET ORAL DAILY
Status: DISCONTINUED | OUTPATIENT
Start: 2024-07-24 | End: 2024-07-31

## 2024-07-24 RX ORDER — ENOXAPARIN SODIUM 100 MG/ML
40 INJECTION SUBCUTANEOUS DAILY
Status: DISCONTINUED | OUTPATIENT
Start: 2024-07-24 | End: 2024-08-02 | Stop reason: HOSPADM

## 2024-07-24 RX ADMIN — SENNOSIDES AND DOCUSATE SODIUM 1 TABLET: 50; 8.6 TABLET ORAL at 08:20

## 2024-07-24 RX ADMIN — PIPERACILLIN SODIUM AND TAZOBACTAM SODIUM 4.5 G: 4; .5 INJECTION, SOLUTION INTRAVENOUS at 22:53

## 2024-07-24 RX ADMIN — PIPERACILLIN SODIUM AND TAZOBACTAM SODIUM 4.5 G: 4; .5 INJECTION, SOLUTION INTRAVENOUS at 17:08

## 2024-07-24 RX ADMIN — ATORVASTATIN CALCIUM 10 MG: 10 TABLET, FILM COATED ORAL at 21:44

## 2024-07-24 RX ADMIN — PIPERACILLIN SODIUM AND TAZOBACTAM SODIUM 4.5 G: 4; .5 INJECTION, SOLUTION INTRAVENOUS at 11:29

## 2024-07-24 RX ADMIN — ASPIRIN 81 MG 81 MG: 81 TABLET ORAL at 08:20

## 2024-07-24 RX ADMIN — METOPROLOL TARTRATE 25 MG: 25 TABLET, FILM COATED ORAL at 21:44

## 2024-07-24 RX ADMIN — Medication 3 L/MIN: at 08:00

## 2024-07-24 RX ADMIN — PIPERACILLIN SODIUM AND TAZOBACTAM SODIUM 4.5 G: 4; .5 INJECTION, SOLUTION INTRAVENOUS at 04:23

## 2024-07-24 RX ADMIN — METOPROLOL TARTRATE 25 MG: 25 TABLET, FILM COATED ORAL at 08:20

## 2024-07-24 RX ADMIN — ENOXAPARIN SODIUM 40 MG: 40 INJECTION SUBCUTANEOUS at 11:29

## 2024-07-24 RX ADMIN — POTASSIUM CHLORIDE 20 MEQ: 14.9 INJECTION, SOLUTION INTRAVENOUS at 08:25

## 2024-07-24 RX ADMIN — AMLODIPINE BESYLATE 5 MG: 5 TABLET ORAL at 11:31

## 2024-07-24 RX ADMIN — POTASSIUM CHLORIDE 20 MEQ: 14.9 INJECTION, SOLUTION INTRAVENOUS at 06:08

## 2024-07-24 RX ADMIN — POLYETHYLENE GLYCOL 3350 17 G: 17 POWDER, FOR SOLUTION ORAL at 08:20

## 2024-07-24 ASSESSMENT — COGNITIVE AND FUNCTIONAL STATUS - GENERAL
MOVING TO AND FROM BED TO CHAIR: A LOT
EATING MEALS: A LOT
HELP NEEDED FOR BATHING: A LOT
EATING MEALS: A LITTLE
TOILETING: TOTAL
STANDING UP FROM CHAIR USING ARMS: A LOT
PERSONAL GROOMING: A LOT
DAILY ACTIVITIY SCORE: 11
WALKING IN HOSPITAL ROOM: A LOT
STANDING UP FROM CHAIR USING ARMS: A LOT
TURNING FROM BACK TO SIDE WHILE IN FLAT BAD: A LOT
PERSONAL GROOMING: A LOT
MOBILITY SCORE: 11
DAILY ACTIVITIY SCORE: 10
TURNING FROM BACK TO SIDE WHILE IN FLAT BAD: A LOT
MOBILITY SCORE: 12
DRESSING REGULAR LOWER BODY CLOTHING: TOTAL
CLIMB 3 TO 5 STEPS WITH RAILING: TOTAL
DRESSING REGULAR LOWER BODY CLOTHING: TOTAL
WALKING IN HOSPITAL ROOM: A LOT
CLIMB 3 TO 5 STEPS WITH RAILING: TOTAL
DRESSING REGULAR UPPER BODY CLOTHING: A LOT
TOILETING: TOTAL
HELP NEEDED FOR BATHING: A LOT
MOVING FROM LYING ON BACK TO SITTING ON SIDE OF FLAT BED WITH BEDRAILS: A LOT
MOVING FROM LYING ON BACK TO SITTING ON SIDE OF FLAT BED WITH BEDRAILS: A LITTLE
MOVING TO AND FROM BED TO CHAIR: A LOT
DRESSING REGULAR UPPER BODY CLOTHING: A LOT

## 2024-07-24 ASSESSMENT — ACTIVITIES OF DAILY LIVING (ADL)
BATHING_WHERE_ASSESSED: EDGE OF BED
HOME_MANAGEMENT_TIME_ENTRY: 10
BATHING_LEVEL_OF_ASSISTANCE: DEPENDENT

## 2024-07-24 ASSESSMENT — PAIN - FUNCTIONAL ASSESSMENT
PAIN_FUNCTIONAL_ASSESSMENT: 0-10

## 2024-07-24 ASSESSMENT — PAIN SCALES - GENERAL
PAINLEVEL_OUTOF10: 0 - NO PAIN

## 2024-07-24 NOTE — PROGRESS NOTES
Occupational Therapy    OT Treatment    Patient Name: Nilam Powell  MRN: [id number]  Today's Date: 7/24/2024  Time Calculation  Start Time: 1301  Stop Time: 1330  Time Calculation (min): 29 min        Assessment:  OT Assessment: Tolerated session well, demonstrating improved functional tolerance and standing balance. Cognitive deficits stills noted, requiring increased cues throughout session. Pt would benefit from continued skilled OT services to improve strength, balance, and functional tolerance to increase independence with ADL tasks  End of Session Communication: Bedside nurse  End of Session Patient Position: Bed, 3 rail up, Alarm on  OT Assessment Results: Decreased ADL status, Decreased cognition, Decreased endurance, Decreased safe judgment during ADL, Decreased functional mobility, Decreased trunk control for functional activities  Plan:  Treatment Interventions: ADL retraining, Functional transfer training, UE strengthening/ROM, Endurance training, Cognitive reorientation, Patient/family training, Equipment evaluation/education, Compensatory technique education  OT Frequency: 5 times per week  OT Discharge Recommendations: Moderate intensity level of continued care  Equipment Recommended upon Discharge: Wheeled walker  OT Recommended Transfer Status: Assist of 2, Moderate assist  OT - OK to Discharge: Yes  Treatment Interventions: ADL retraining, Functional transfer training, UE strengthening/ROM, Endurance training, Cognitive reorientation, Patient/family training, Equipment evaluation/education, Compensatory technique education    Subjective   Previous Visit Info:  OT Last Visit  OT Received On: 07/24/24  General:  General  Family/Caregiver Present: Yes  Caregiver Feedback:  and PCNA present  Co-Treatment: PT  Co-Treatment Reason: Two-person skilled assist required for safe patient handling  Prior to Session Communication: Bedside nurse  Patient Position Received: Bed, 3 rail up, Alarm off,  caregiver present  General Comment: Cleared for therapy per RN. Pt supine in bed upon arrival, sleeping but easily arousable and agreeable to tx  Precautions:  Medical Precautions: Fall precautions, Oxygen therapy device and L/min (3L)  Vital Signs:  Vital Signs  Heart Rate: 60  Resp: 19  SpO2: 99 %  Pain:  Pain Assessment  Pain Assessment: 0-10  0-10 (Numeric) Pain Score: 0 - No pain    Objective    Cognition:  Cognition  Overall Cognitive Status: Impaired  Orientation Level: Disoriented to situation, Disoriented to time  Following Commands: Follows one step commands with repetition  Safety Judgment: Decreased awareness of need for assistance  Safety/Judgement: Exceptions to WFL  Insight: Moderate  Impulsive: Mildly  Processing Speed: Delayed    Activities of Daily Living:    LE Bathing  LE Bathing Level of Assistance: Dependent  LE Bathing Where Assessed: Edge of bed  LE Bathing Comments: dependent assist for LB bathing following urine incontinence upon stand    UE Dressing  UE Dressing Level of Assistance: Moderate assistance  UE Dressing Where Assessed: Edge of bed  UE Dressing Comments: don/doff gown    LE Dressing  LE Dressing: Yes  Sock Level of Assistance: Dependent  LE Dressing Where Assessed: Bed level  LE Dressing Comments: don socks    Toileting  Toileting Level of Assistance: Dependent  Toileting Comments: demonstrating urine/bowel incontinence upon stand, requiring dependent assist for pericare hygiene  Functional Standing Tolerance:  Time: 3 min  Functional Standing Tolerance Comments: tolerated standing ~3 min during transfer task  Bed Mobility/Transfers: Bed Mobility  Bed Mobility: Yes  Bed Mobility 1  Bed Mobility 1: Supine to sitting  Level of Assistance 1: Moderate assistance, +2  Bed Mobility Comments 1: assist with BLE advancement and trunk elevation with cues to scoot hips to EOB. Pt demonstrating poor sitting balance with additional assist + cues required for postural alignment.  Bed Mobility  2  Bed Mobility  2: Sitting to supine  Level of Assistance 2: Maximum assistance, +2  Bed Mobility Comments 2: assist to lift BLE on bed and for trunk control. Dependent to boost to HOB with use of drawsheet.    Transfers  Transfer: Yes  Transfer 1  Technique 1: Sit to stand, Stand to sit  Transfer Level of Assistance 1: Arm in arm assistance, Moderate assistance, +2, Minimal verbal cues  Trials/Comments 1: assist for trunk elevation and forward weightshifting with cues for proper hand placement. Pt demonstrating urine incontinence in standing, having to return to EOB with noted decreased eccentric lowering.  Transfers 2  Technique 2: Sit to stand, Stand to sit  Transfer Level of Assistance 2: Arm in arm assistance, Moderate assistance, +2, Minimal verbal cues  Trials/Comments 2: assist for trunk elevation and proper hand placement with cues for postural alignment once standing, demonstrating decreased eccentric lowering to EOB    Functional Mobility:  Functional Mobility  Functional Mobility Performed: Yes  Functional Mobility 1  Assistance 1: Moderate assistance, Arm in arm assistance (x2)  Comments 1: tolerated taking lateral steps to HOB with cues for step sequencing and postural alignment, demonstrating fair- balance throughout task  Sitting Balance:  Static Sitting Balance  Static Sitting-Level of Assistance: Minimum assistance, Contact guard  Static Sitting-Comment/Number of Minutes: retropulsion with intermittent L lateral lean with assist to correct  Dynamic Sitting Balance  Dynamic Sitting-Balance: Forward lean, Reaching across midline  Dynamic Sitting-Comments: fair-/poor sitting balance during reaching activity at WOB, requiring intermittent assist d/t retropulsion  Standing Balance:  Static Standing Balance  Static Standing-Level of Assistance: Moderate assistance (x2)  Static Standing-Comment/Number of Minutes: fair- balance during transfer task    Therapy/Activity:    Therapeutic Activity  Therapeutic  Activity Performed: Yes  Therapeutic Activity 1: participated in seated reaching activity, retrieving items at various heights and placing to opposite side working on improving abdominal strength to increase independence with seated ADL tasks    Outcome Measures:[facility] Daily Activity  Putting on and taking off regular lower body clothing: Total  Bathing (including washing, rinsing, drying): A lot  Putting on and taking off regular upper body clothing: A lot  Toileting, which includes using toilet, bedpan or urinal: Total  Taking care of personal grooming such as brushing teeth: A lot  Eating Meals: A lot  Daily Activity - Total Score: 10    Education Documentation  Body Mechanics, taught by LINN Sargent at 7/24/2024  2:43 PM.  Learner: Patient  Readiness: Acceptance  Method: Explanation, Demonstration  Response: Needs Reinforcement    ADL Training, taught by LINN Sargent at 7/24/2024  2:43 PM.  Learner: Patient  Readiness: Acceptance  Method: Explanation, Demonstration  Response: Needs Reinforcement    Education Comments  No comments found.      Problem: ADLs  Goal: Pt will complete ADL tasks at mod I with use of AE prn   Outcome: Progressing     Problem: Functional Mobility  Goal: Pt will perform functional mobility household distances at mod I with use of RW.     Outcome: Progressing     Problem: OT Transfers  Goal: Pt will perform BUE exercises to improve strength and independence with functional transfers/ADL tasks.    Outcome: Progressing

## 2024-07-24 NOTE — PROGRESS NOTES
I have seen the patient either independently or with an associated resident physician or advanced practice provider    Overnight Events: No acute events overnight. PM agitation was decreased and sleep improved with seroquel.     Physical Exam  Constitutional:       General: She is not in acute distress.     Appearance: She is not ill-appearing.   HENT:      Mouth/Throat:      Mouth: Mucous membranes are moist.   Eyes:      Pupils: Pupils are equal, round, and reactive to light.   Cardiovascular:      Rate and Rhythm: Normal rate and regular rhythm.   Pulmonary:      Effort: Pulmonary effort is normal. No respiratory distress.   Abdominal:      Palpations: Abdomen is soft.      Tenderness: There is no abdominal tenderness.   Musculoskeletal:         General: Normal range of motion.      Cervical back: Normal range of motion.   Skin:     General: Skin is warm and dry.   Neurological:      General: No focal deficit present.      Mental Status: She is alert.      Comments: Alert and oriented to person. CAM positive. Moves four extremities independently in coordinated fashion. No lateralized weakness.       Neuro: Acute metabolic encephalopathy vs anoxic brain injury. Delirium precautions. Will continue with 25 mg seroquel oral nightly prn for agitation. Discontinued zyprexa prn. Bilateral soft wrist restraints removed this am. Continued melatonin 5 mg nightly at 1900 for sleep.  Cardiac: S/p VF cardiac arrest with pre-hospital ROSC. Heart cath yesterday showed no obstructing lesion, appreciate continued EP recommendations concerning AICD placement. Continue home ASA, statin. Metoprolol tartrate PO 25 mg BID. Ordered home norvasc at reduced dose 5 mg oral daily.  Pulmonary: Acute respiratory failure with hypoxia and hypercapnia improving. Oxygen requirement improved.  Gastrointestinal: Cardiac diet easy to chew with thin liquids on return. Bowel regimen per protocol.  Renal: KASH continues to improve. No gutierrez in  place.  Endocrine: No history of IDDM. Will continue glucose checks until tolerating diet then plan to discontinue if remains in acceptable control range.  Hematology: Heparin gtt discontinued for acs. Lovenox 40 mg daily for DVT prophylaxis. ASA continued.  Infectious Disease: Leukocytosis with continued improvement, zosyn complete today.   Musculoskeletal: No acute issues     Lines/Tubes/Drains: RIJ CVC, will discontinue as soon as able to re-establish PIV access        Prophylaxis: Lovenox  Med to Discontinue: Heparin gtt      Disposition: Stable for transfer       ABCDEF Checklist  Analgesia: Reviewed  Breathing: Not intubated, on NC  Choice of analgesia/sedation: Analgesic and sedative agents adjusted per clinical context.   Delirium assessed by CAM, will avoid exacerbating factors   Early mobility and exercise: Physical and occupational therapy engaged   Family: Plan of care, overall trajectory of patient shared with family. Questions elicited and answered as appropriate.

## 2024-07-24 NOTE — PROGRESS NOTES
Speech-Language Pathology    Speech-Language Pathology Clinical Swallow Treatment    Patient Name: Nilam Powell  MRN: [id number]  : 1950  Today's Date: 24  Start Time: 910  Stop Time: 925  Time Calculation (min): 15 min    ASSESSMENT  SLP TX Intervention Outcome: Making Progress Towards Goals  Treatment Tolerance: Patient tolerated treatment well    Impressions: Pt tolerated regular solids and thin liquids safely without s/s aspiration. Pt would benefit from ongoing skilled ST to minimize aspiration risk and ensure ongoing safety with the least restrictive diet.    PLAN  Recommended solid consistency: Regular (IDDSI level 7)  Recommended liquid consistency: Thin (IDDSI 0)  Recommended medication administration: Whole, in thin liquid, in puree  Safe swallow strategies:  - Upright positioning for all PO intake  - Small bites  - One bite/sip at a time  - Total assist for feeding    Discharge recommendation: Unable to determine at this time; please see follow-up notes for DC recommendation.  Inpatient/Swing Bed or Outpatient: Inpatient  SLP TX Plan: Continue Plan of Care  SLP Plan: Skilled SLP  SLP Frequency: 2x per week  Duration: Current admission  Next Treatment Priority: diet haim, strat          SUBJECTIVE  Prior to Session Communication: Bedside nurse  RN cleared pt to participate in session and reported pt confused, sitter @ bedside  Respiratory status: Supplemental oxygen via NC  Positioning: Upright in bed  Pt was alert, cooperative, and pleasantly confused for session. Pt is in soft bilateral wrist restraints and sitter is @ bedside    Pain Assessment  Pain Assessment: 0-10  0-10 (Numeric) Pain Score: 0 - No pain       Orientation: Oriented to self and Oriented to hospital but not name of facility  Ability to follow functional commands: WFL    OBJECTIVE  Therapeutic Swallow  Therapeutic Swallow Intervention : PO Trials, Compensatory Strategies    Goals (start date 24. Anticipated time frame  for goal attainment: ): 2 weeks  Pt will consume prescribed diet (current diet is easy to chew) without overt s/sx aspiration/penetration in 95% of observed trials.  GOAL START: 7/22/2024    GOAL END: 8/5/22   Status: Progressing   Progress this date: Pt fed by SLP d/t restraints, consumed 4 oz thin liquids via straw, and 2 oz applesauce and diane doon cookie. Mastication WFL adequate oral clearance, no s/s aspiration  in 95% of observed trials  Pt will consume trials of upgraded textures without overt s/sx aspiration in order for consideration of diet texture upgrade.   GOAL START: 7/22/2024    GOAL END: 8/5/22   Status: Progressing   Progress this date: Pt fed by SLP d/t restraints, consumed 4 oz thin liquids via straw, and 2 oz applesauce and diane doon cookie. Mastication WFL adequate oral clearance, no s/s aspiration    Pt will demonstrate follow-through of trained compensatory strategies during a meal/snack with 90% acc independently.  GOAL START: 7/22/2024    GOAL END: 8/5/22   Status: Progressing   Progress this date: Pt fed by SLP, able to take single sips with min v/c  Treatment/Education:  Results and recommendations were relayed to: Patient and Bedside nurse  Education provided: Yes   Learner: Patient   Barriers to learning: Cognitive limitations barrier   Method of teaching: Verbal   Topic: role of ST, results of assessment, and recommended safe swallow strategies   Outcome of teaching: Pt demonstrated partial understanding

## 2024-07-24 NOTE — PROGRESS NOTES
"Nutrition Follow up Note    Nutrition Assessment      Extubated 7/21, diet advanced today 7/24. Patient eating breakfast at time of visit, appears to have good appetite. Will continue to follow and monitor po intake.    Nutrition History:  Food and Nutrient History: NA     Food Allergies/Intolerances:  None  GI Symptoms: None  Oral Problems: None    Anthropometrics:  Ht: 170.2 cm (5' 7\"), Wt: 96.5 kg (212 lb 11.9 oz), BMI: 33.31  IBW/kg (Dietitian Calculated): 61 kg  Percent of IBW: 160 %     Weight Change:  Daily Weight  07/24/24 : 96.5 kg (212 lb 11.9 oz)     Nutrition Focused Physical Exam Findings:   Subcutaneous Fat Loss  Orbital Fat Pads: Well nourished (slightly bulging fat pads)  Buccal Fat Pads: Well nourished (full, rounded cheeks)  Triceps: Well nourished (ample fat tissue)  Ribs: Defer    Muscle Wasting  Temporalis: Well nourished (well-defined muscle)  Pectoralis (Clavicular Region): Well nourished (clavicle not visible)  Deltoid/Trapezius: Well nourished (rounded appearance at arm, shoulder, neck)  Interosseous: Defer  Trapezius/Infraspinatus/Supraspinatus (Scapular Region): Defer  Quadriceps: Defer  Gastrocnemius: Defer    Nutrition Significant Labs:  Lab Results   Component Value Date    WBC 13.1 (H) 07/24/2024    HGB 10.8 (L) 07/24/2024    HCT 34.0 (L) 07/24/2024     07/24/2024    ALT 91 (H) 07/19/2024     (H) 07/19/2024     07/24/2024    K 3.3 (L) 07/24/2024     07/24/2024    CREATININE 1.10 07/24/2024    BUN 16 07/24/2024    CO2 24 07/24/2024    TSH 2.98 07/19/2024    INR 1.0 07/19/2024    HGBA1C 5.7 (H) 06/29/2023     Nutrition Specific Medications:  amLODIPine, 5 mg, oral, Daily  aspirin, 81 mg, oral, Daily  atorvastatin, 10 mg, oral, Nightly  enoxaparin, 40 mg, subcutaneous, Daily  metoprolol tartrate, 25 mg, oral, BID  oxygen, , inhalation, Continuous - 02/gases  piperacillin-tazobactam, 4.5 g, intravenous, q6h  polyethylene glycol, 17 g, oral, " Daily  sennosides-docusate sodium, 1 tablet, oral, BID         Dietary Orders (From admission, onward)       Start     Ordered    07/24/24 0939  Adult diet Cardiac; 70 gm fat; 2 - 3 grams Sodium; 1:1 Feeding  Diet effective now        Comments: Compensatory Swallowing Strategies:                                                                                                                                                                                                                                                           Upright 90 degrees as possible for all oral intake, single sips/bites,  upright after meals   Question Answer Comment   Diet type Cardiac    Fat restriction: 70 gm fat    Sodium restriction: 2 - 3 grams Sodium    Select tray type: 1:1 Feeding        07/24/24 0939                   Estimated Needs:   Estimated Energy Needs  Total Energy Estimated Needs (kCal): 1525 kCal  Total Estimated Energy Need per Day (kCal/kg): 25 kCal/kg  Method for Estimating Needs: IBW    Estimated Protein Needs  Total Protein Estimated Needs (g): 61 g  Total Protein Estimated Needs (g/kg): 1 g/kg  Method for Estimating Needs: IBW    Estimated Fluid Needs  Total Fluid Estimated Needs (mL): 1525 mL  Method for Estimating Needs: 1 mL/kcal      Nutrition Diagnosis   Nutrition Diagnosis:     Nutrition Diagnosis  Patient has Nutrition Diagnosis: Yes  Diagnosis Status (1): Resolved  Nutrition Diagnosis 1: Inadequate energy intake  Related to (1): decreased ability to consume sufficient energy  As Evidenced by (1): NPO     Nutrition Interventions/Recommendations   Nutrition Interventions and Recommendations:    Nutrition Prescription:  Individualized Nutrition Prescription Provided for : 1525 calories, 61 gm protein when diet advances    Nutrition Interventions:   Food and/or Nutrient Delivery Interventions  Interventions: Meals and snacks  Meals and Snacks: Fat-modified diet, Mineral-modified diet  Goal: provide as  ordered    Education Documentation  No documentation found.         Nutrition Monitoring and Evaluation   Monitoring/Evaluation:   Food/Nutrient Related History Monitoring  Monitoring and Evaluation Plan: Energy intake  Energy Intake: Estimated energy intake  Criteria: pt to consume >/= 75% estimated needs       Time Spent/Follow-up:   Follow Up  Time Spent (min): 30 minutes  Last Date of Nutrition Visit: 07/24/24  Nutrition Follow-Up Needed?: 5-7 days  Follow up Comment: 7/29/24

## 2024-07-24 NOTE — CARE PLAN
The clinical goals for the shift include Patient remains hemodynamically stable and safe      Problem: Knowledge Deficit  Goal: Patient/family/caregiver demonstrates understanding of disease process, treatment plan, medications, and discharge instructions  Outcome: Progressing     Problem: Pain - Adult  Goal: Verbalizes/displays adequate comfort level or baseline comfort level  Outcome: Progressing     Problem: Safety - Adult  Goal: Free from fall injury  Outcome: Progressing     Problem: Discharge Planning  Goal: Discharge to home or other facility with appropriate resources  Outcome: Progressing     Problem: Chronic Conditions and Co-morbidities  Goal: Patient's chronic conditions and co-morbidity symptoms are monitored and maintained or improved  Outcome: Progressing     Problem: Skin  Goal: Decreased wound size/increased tissue granulation at next dressing change  Outcome: Progressing  Flowsheets (Taken 7/24/2024 1516)  Decreased wound size/increased tissue granulation at next dressing change:   Promote sleep for wound healing   Protective dressings over bony prominences   Utilize specialty bed per algorithm  Goal: Participates in plan/prevention/treatment measures  Outcome: Progressing  Flowsheets (Taken 7/24/2024 1516)  Participates in plan/prevention/treatment measures:   Elevate heels   Discuss with provider PT/OT consult   Increase activity/out of bed for meals  Goal: Prevent/manage excess moisture  Outcome: Progressing  Flowsheets (Taken 7/24/2024 1516)  Prevent/manage excess moisture:   Monitor for/manage infection if present   Cleanse incontinence/protect with barrier cream   Moisturize dry skin   Follow provider orders for dressing changes  Goal: Prevent/minimize sheer/friction injuries  Outcome: Progressing  Flowsheets (Taken 7/24/2024 1516)  Prevent/minimize sheer/friction injuries:   Complete micro-shifts as needed if patient unable. Adjust patient position to relieve pressure points, not a full  turn   Increase activity/out of bed for meals   Use pull sheet   HOB 30 degrees or less   Turn/reposition every 2 hours/use positioning/transfer devices   Utilize specialty bed per algorithm  Goal: Promote/optimize nutrition  Outcome: Progressing  Flowsheets (Taken 7/24/2024 1516)  Promote/optimize nutrition:   Monitor/record intake including meals   Consume > 50% meals/supplements   Offer water/supplements/favorite foods   Discuss with provider if NPO > 2 days  Goal: Promote skin healing  Outcome: Progressing  Flowsheets (Taken 7/24/2024 1516)  Promote skin healing:   Assess skin/pad under line(s)/device(s)   Protective dressings over bony prominences   Turn/reposition every 2 hours/use positioning/transfer devices   Ensure correct size (line/device) and apply per  instructions   Rotate device position/do not position patient on device     Problem: Nutrition  Goal: Less than 5 days NPO/clear liquids  Outcome: Progressing  Goal: Oral intake greater than 50%  Outcome: Progressing  Goal: Oral intake greater 75%  Outcome: Progressing  Goal: Consume prescribed supplement  Outcome: Progressing  Goal: Adequate PO fluid intake  Outcome: Progressing  Goal: Nutrition support goals are met within 48 hrs  Outcome: Progressing  Goal: Nutrition support is meeting 75% of nutrient needs  Outcome: Progressing  Goal: BG  mg/dL  Outcome: Progressing  Goal: Lab values WNL  Outcome: Progressing  Goal: Electrolytes WNL  Outcome: Progressing  Goal: Promote healing  Outcome: Progressing  Goal: Maintain stable weight  Outcome: Progressing     Problem: Fall/Injury  Goal: Not fall by end of shift  Outcome: Progressing  Goal: Be free from injury by end of the shift  Outcome: Progressing  Goal: Verbalize understanding of personal risk factors for fall in the hospital  Outcome: Progressing  Goal: Verbalize understanding of risk factor reduction measures to prevent injury from fall in the home  Outcome: Progressing  Goal: Use  assistive devices by end of the shift  Outcome: Progressing  Goal: Pace activities to prevent fatigue by end of the shift  Outcome: Progressing     Problem: Risk for falls  Goal: I will remain free from falls  Outcome: Progressing

## 2024-07-24 NOTE — PROGRESS NOTES
Physical Therapy    Physical Therapy Treatment    Patient Name: Nilam Powell  MRN: [id number]  Today's Date: 7/24/2024  Time Calculation  Start Time: 1303  Stop Time: 1328  Time Calculation (min): 25 min    Assessment/Plan   PT Assessment  Rehab Prognosis: Good  Evaluation/Treatment Tolerance: Patient tolerated treatment well  Medical Staff Made Aware: Yes  End of Session Communication: Bedside nurse  Assessment Comment: improved mobility and activity tolerance noted today; pt required MOD A x 2 for transfers. no L knee buckling observed.  End of Session Patient Position: Bed, 3 rail up (call button in reach; + Sitter present)  PT Plan  Inpatient/Swing Bed or Outpatient: Inpatient  PT Plan  Treatment/Interventions: Bed mobility, Transfer training, Gait training, Stair training, Balance training, Neuromuscular re-education, Strengthening, Endurance training, Range of motion, Therapeutic exercise, Therapeutic activity  PT Plan: Ongoing PT  PT Frequency: 6 times per week  PT Discharge Recommendations: Moderate intensity level of continued care  Equipment Recommended upon Discharge: Wheeled walker  PT Recommended Transfer Status: Assist x2  PT - OK to Discharge: Yes      General Visit Information:   PT  Visit  PT Received On: 07/24/24  General  Reason for Referral: impaired functional mobility; Pt admitted from home after being found unresponsive. Upon EMS arrival pt was found to be pulseless, CPR was performed. Pt was found to be in ventricular fibrillation, underwent cardioversion. Pt was intubated 7/19 extubated 7/21  Referred By: Demetrice Mendoza PA-C  Past Medical History Relevant to Rehab: HTN, DLP, gout, uterine fibroid, Colonoscopy, hysterectomy  Caregiver Feedback:  present for session  Co-Treatment: OT  Co-Treatment Reason: ICU status; medicaaly complex pt requiring 2 person assist for safety.  Prior to Session Communication: Bedside nurse  Patient Position Received: Bed, 4 rail up (+ sitter  present)  General Comment: pt alert and able to follow commands; pt needed enourgement to participate    Subjective   Precautions:  Precautions  Medical Precautions: Fall precautions, Oxygen therapy device and L/min  Precautions Comment: educated and instructed pt in safety techniques    Vital Signs:  Vital Signs  Heart Rate: 60  Resp: 19  SpO2:  (99% wearing 3L of oxygen)  BP: 129/70    Objective   Pain:  Pain Assessment  Pain Assessment:  (0/10)  Cognition:  Cognition  Overall Cognitive Status: Impaired  Orientation Level: Disoriented to situation, Disoriented to time  Safety/Judgement:  (impaired safety awareness)  Insight: Moderate  Processing Speed: Delayed    Coordination:  Coordination Comment: decreased rate/accuracy of movements    Postural Control:  Postural Control  Posture Comment: forward head with postural sway sitting on EOB    Activity Tolerance:  Activity Tolerance  Endurance:  (pt easily fatigued with activities/transfers; frequent rest breaks given)      Treatments:    Therapeutic Exercise Performed:  (B LE AROM therex: AP, GS, QS, HS, ABD, LAQ and hip flexion x 10-15 reps; VC and tactile cues to stay on task; pt also performed seated reaching activities with MIN A for trunk support. pt easily fatigued with increased postural sway noted.)       Bed Mobility:  supine to sit with MOD A x 2 for trunk up, scooting and B LE; increased time and energy to scoot and advance BLE to sit on EOB. GOOD-/FAIR+ sitting balance. mild postural sway noted. intermittent MIN A for trunk support; doff soiled gown; donned clean gown on EOB. After session pt returned to supine with MAX A x 2 for trunk down and B LE. Placed bed into chair position. + sitter and spouse present      Ambulation/Gait Training Performed:  on 2nd trial pt took 7-8 short lateral steps toward HOB with MOD A x 2/Handheld A x 2. mild unsteadiness noted. no L knee buckling noted today.    Transfer: sit <-> stand x 2 trials with MOD A x 2/Handheld A  x 2 blocking L knee from buckling. pt became incontinent of urine and soiled bedding and gown.PCA present to clean up bedding and don clean linens.         Outcome Measures:  [facility] Basic Mobility  Turning from your back to your side while in a flat bed without using bedrails: A lot  Moving from lying on your back to sitting on the side of a flat bed without using bedrails: A lot  Moving to and from bed to chair (including a wheelchair): A lot  Standing up from a chair using your arms (e.g. wheelchair or bedside chair): A lot  To walk in hospital room: A lot  Climbing 3-5 steps with railing: Total  Basic Mobility - Total Score: 11           Encounter Problems       Encounter Problems (Active)       Balance       STG - Maintains static standing balance with upper extremity support and min assist x 1. (Progressing)       Start:  07/22/24    Expected End:  08/22/24       INTERVENTIONS:  1. Practice standing with minimal support.  2. Educate patient about standing tolerance.  3. Educate patient about independence with gait, transfers, and ADL's.  4. Educate patient about use of assistive device.  5. Educate patient about self-directed care.         STG - Maintains static sitting balance with upper extremity support and supervision for safety. (Progressing)       Start:  07/22/24    Expected End:  08/22/24       INTERVENTIONS:  1. Practice sitting on the edge of a bed/mat with minimal support.  2. Educate patient about maintining total hip precautions while maintaining balance.  3. Educate patient about pressure relief.  4. Educate patient about use of assistive device.            Mobility       STG - Patient will ambulate 50' with use of rolling walker and min assist x 1. (Progressing)       Start:  07/22/24    Expected End:  08/22/24            STG - Patient will ambulate up and down a curb/step with min assist x 1. (Progressing)       Start:  07/22/24    Expected End:  08/22/24               PT Transfers       STG -  Patient to transfer to and from sit to supine with supervision for safety. (Progressing)       Start:  07/22/24    Expected End:  08/22/24            STG - Patient will transfer sit to and from stand with use of rolling walker and min assist x 1. (Progressing)       Start:  07/22/24    Expected End:  08/22/24               Pain - Adult          Safety       STG - Patient uses call light consistently to request assistance with transfers (Progressing)       Start:  07/22/24    Expected End:  08/22/24

## 2024-07-24 NOTE — PROGRESS NOTES
Patient not medically clear. TCC met with patient's spouse while patient was working with therapy to discuss discharge plans. See unsure if he wants patient at SNF or [facility], TCC to print both lists when [employer] back up. Will follow.      07/24/24 4086   Discharge Planning   Home or Post Acute Services Other (Comment)  (TBD)   Expected Discharge Disposition Other  (TBD)   Does the patient need discharge transport arranged? No

## 2024-07-24 NOTE — NURSING NOTE
Received report from Nakul RN, assumed care of patient. Patient was brought to SD 7, cleaned/bathed after episode of incontinence of stool. Patient oriented to self, reoriented to place and event with family at bedside. Patient denies any pain/needs at this time. Safety sitter at bedside. Continuing to monitor, call light in reach.

## 2024-07-24 NOTE — NURSING NOTE
Patient with Rt internal jugular TLC, dressing D&I, all lumens flush easily, distal and proximal with brisk blood return, medial with no blood return even with cap change. All lumens clamped and with curos caps. RN aware of medial lumen, she is attempting to place peripheral and remove line.

## 2024-07-24 NOTE — NURSING NOTE
Head to toe skin assessment complete. Pt has borders over bony prominences for protection. Waffle mattress in place. Pt able to turn self

## 2024-07-25 LAB
ALT SERPL-CCNC: 38 U/L (ref 5–40)
ANION GAP SERPL CALC-SCNC: 13 MMOL/L
AORTIC VALVE MEAN GRADIENT: 9 MMHG
AORTIC VALVE PEAK VELOCITY: 1.98 M/S
AST SERPL-CCNC: 24 U/L (ref 5–40)
AV PEAK GRADIENT: 15.7 MMHG
AVA (PEAK VEL): 1.23 CM2
AVA (VTI): 1.31 CM2
BASOPHILS # BLD AUTO: 0.07 X10*3/UL (ref 0–0.1)
BASOPHILS NFR BLD AUTO: 0.5 %
BUN SERPL-MCNC: 17 MG/DL (ref 8–25)
CALCIUM SERPL-MCNC: 9 MG/DL (ref 8.5–10.4)
CHLORIDE SERPL-SCNC: 103 MMOL/L (ref 97–107)
CO2 SERPL-SCNC: 25 MMOL/L (ref 24–31)
CREAT SERPL-MCNC: 1.2 MG/DL (ref 0.4–1.6)
EGFRCR SERPLBLD CKD-EPI 2021: 48 ML/MIN/1.73M*2
EJECTION FRACTION APICAL 4 CHAMBER: 50.5
EJECTION FRACTION: 50 %
EOSINOPHIL # BLD AUTO: 0.23 X10*3/UL (ref 0–0.4)
EOSINOPHIL NFR BLD AUTO: 1.6 %
ERYTHROCYTE [DISTWIDTH] IN BLOOD BY AUTOMATED COUNT: 15.6 % (ref 11.5–14.5)
GLUCOSE BLD MANUAL STRIP-MCNC: 113 MG/DL (ref 74–99)
GLUCOSE BLD MANUAL STRIP-MCNC: 123 MG/DL (ref 74–99)
GLUCOSE BLD MANUAL STRIP-MCNC: 160 MG/DL (ref 74–99)
GLUCOSE BLD MANUAL STRIP-MCNC: 90 MG/DL (ref 74–99)
GLUCOSE SERPL-MCNC: 99 MG/DL (ref 65–99)
HCT VFR BLD AUTO: 36.7 % (ref 36–46)
HGB BLD-MCNC: 11.7 G/DL (ref 12–16)
IMM GRANULOCYTES # BLD AUTO: 0.2 X10*3/UL (ref 0–0.5)
IMM GRANULOCYTES NFR BLD AUTO: 1.4 % (ref 0–0.9)
LEFT VENTRICLE INTERNAL DIMENSION DIASTOLE: 6.22 CM (ref 3.5–6)
LEFT VENTRICULAR OUTFLOW TRACT DIAMETER: 2 CM
LV EJECTION FRACTION BIPLANE: 50 %
LYMPHOCYTES # BLD AUTO: 1.55 X10*3/UL (ref 0.8–3)
LYMPHOCYTES NFR BLD AUTO: 11 %
MAGNESIUM SERPL-MCNC: 1.8 MG/DL (ref 1.6–3.1)
MCH RBC QN AUTO: 27.3 PG (ref 26–34)
MCHC RBC AUTO-ENTMCNC: 31.9 G/DL (ref 32–36)
MCV RBC AUTO: 86 FL (ref 80–100)
MITRAL VALVE E/A RATIO: 0.71
MONOCYTES # BLD AUTO: 1.26 X10*3/UL (ref 0.05–0.8)
MONOCYTES NFR BLD AUTO: 8.9 %
NEUTROPHILS # BLD AUTO: 10.84 X10*3/UL (ref 1.6–5.5)
NEUTROPHILS NFR BLD AUTO: 76.6 %
NRBC BLD-RTO: 0 /100 WBCS (ref 0–0)
PHOSPHATE SERPL-MCNC: 2.5 MG/DL (ref 2.5–4.5)
PLATELET # BLD AUTO: 193 X10*3/UL (ref 150–450)
POTASSIUM SERPL-SCNC: 3.2 MMOL/L (ref 3.4–5.1)
RBC # BLD AUTO: 4.28 X10*6/UL (ref 4–5.2)
RIGHT VENTRICLE PEAK SYSTOLIC PRESSURE: 33 MMHG
SODIUM SERPL-SCNC: 141 MMOL/L (ref 133–145)
WBC # BLD AUTO: 14.2 X10*3/UL (ref 4.4–11.3)

## 2024-07-25 PROCEDURE — 90792 PSYCH DIAG EVAL W/MED SRVCS: CPT

## 2024-07-25 PROCEDURE — 84450 TRANSFERASE (AST) (SGOT): CPT | Performed by: REGISTERED NURSE

## 2024-07-25 PROCEDURE — 2500000004 HC RX 250 GENERAL PHARMACY W/ HCPCS (ALT 636 FOR OP/ED): Performed by: INTERNAL MEDICINE

## 2024-07-25 PROCEDURE — 2500000002 HC RX 250 W HCPCS SELF ADMINISTERED DRUGS (ALT 637 FOR MEDICARE OP, ALT 636 FOR OP/ED): Performed by: REGISTERED NURSE

## 2024-07-25 PROCEDURE — 83735 ASSAY OF MAGNESIUM: CPT | Performed by: STUDENT IN AN ORGANIZED HEALTH CARE EDUCATION/TRAINING PROGRAM

## 2024-07-25 PROCEDURE — 2500000001 HC RX 250 WO HCPCS SELF ADMINISTERED DRUGS (ALT 637 FOR MEDICARE OP)

## 2024-07-25 PROCEDURE — 85025 COMPLETE CBC W/AUTO DIFF WBC: CPT | Performed by: STUDENT IN AN ORGANIZED HEALTH CARE EDUCATION/TRAINING PROGRAM

## 2024-07-25 PROCEDURE — 2500000005 HC RX 250 GENERAL PHARMACY W/O HCPCS: Performed by: STUDENT IN AN ORGANIZED HEALTH CARE EDUCATION/TRAINING PROGRAM

## 2024-07-25 PROCEDURE — 2500000001 HC RX 250 WO HCPCS SELF ADMINISTERED DRUGS (ALT 637 FOR MEDICARE OP): Performed by: STUDENT IN AN ORGANIZED HEALTH CARE EDUCATION/TRAINING PROGRAM

## 2024-07-25 PROCEDURE — 2060000001 HC INTERMEDIATE ICU ROOM DAILY

## 2024-07-25 PROCEDURE — 92526 ORAL FUNCTION THERAPY: CPT | Mod: GN | Performed by: SPEECH-LANGUAGE PATHOLOGIST

## 2024-07-25 PROCEDURE — 82947 ASSAY GLUCOSE BLOOD QUANT: CPT | Performed by: STUDENT IN AN ORGANIZED HEALTH CARE EDUCATION/TRAINING PROGRAM

## 2024-07-25 PROCEDURE — 82947 ASSAY GLUCOSE BLOOD QUANT: CPT

## 2024-07-25 PROCEDURE — 2500000001 HC RX 250 WO HCPCS SELF ADMINISTERED DRUGS (ALT 637 FOR MEDICARE OP): Performed by: REGISTERED NURSE

## 2024-07-25 PROCEDURE — 84460 ALANINE AMINO (ALT) (SGPT): CPT | Performed by: REGISTERED NURSE

## 2024-07-25 PROCEDURE — 2500000002 HC RX 250 W HCPCS SELF ADMINISTERED DRUGS (ALT 637 FOR MEDICARE OP, ALT 636 FOR OP/ED)

## 2024-07-25 PROCEDURE — 2500000004 HC RX 250 GENERAL PHARMACY W/ HCPCS (ALT 636 FOR OP/ED): Performed by: STUDENT IN AN ORGANIZED HEALTH CARE EDUCATION/TRAINING PROGRAM

## 2024-07-25 PROCEDURE — 80048 BASIC METABOLIC PNL TOTAL CA: CPT | Performed by: STUDENT IN AN ORGANIZED HEALTH CARE EDUCATION/TRAINING PROGRAM

## 2024-07-25 PROCEDURE — 84100 ASSAY OF PHOSPHORUS: CPT | Performed by: STUDENT IN AN ORGANIZED HEALTH CARE EDUCATION/TRAINING PROGRAM

## 2024-07-25 PROCEDURE — 2500000001 HC RX 250 WO HCPCS SELF ADMINISTERED DRUGS (ALT 637 FOR MEDICARE OP): Performed by: INTERNAL MEDICINE

## 2024-07-25 RX ORDER — METOPROLOL SUCCINATE 50 MG/1
50 TABLET, EXTENDED RELEASE ORAL 2 TIMES DAILY
Status: DISCONTINUED | OUTPATIENT
Start: 2024-07-25 | End: 2024-07-29

## 2024-07-25 RX ORDER — OLANZAPINE 5 MG/1
5 TABLET, ORALLY DISINTEGRATING ORAL NIGHTLY
Status: DISCONTINUED | OUTPATIENT
Start: 2024-07-25 | End: 2024-07-29

## 2024-07-25 RX ORDER — POTASSIUM CHLORIDE 1.5 G/1.58G
40 POWDER, FOR SOLUTION ORAL 2 TIMES DAILY
Status: COMPLETED | OUTPATIENT
Start: 2024-07-25 | End: 2024-07-25

## 2024-07-25 RX ORDER — METOPROLOL TARTRATE 25 MG/1
25 TABLET, FILM COATED ORAL ONCE
Status: COMPLETED | OUTPATIENT
Start: 2024-07-25 | End: 2024-07-25

## 2024-07-25 RX ORDER — MAGNESIUM SULFATE 1 G/100ML
1 INJECTION INTRAVENOUS ONCE
Status: COMPLETED | OUTPATIENT
Start: 2024-07-25 | End: 2024-07-25

## 2024-07-25 RX ORDER — OLANZAPINE 2.5 MG/1
2.5 TABLET ORAL EVERY 8 HOURS PRN
Status: DISCONTINUED | OUTPATIENT
Start: 2024-07-25 | End: 2024-08-02 | Stop reason: HOSPADM

## 2024-07-25 RX ORDER — OLANZAPINE 10 MG/2ML
2.5 INJECTION, POWDER, FOR SOLUTION INTRAMUSCULAR EVERY 8 HOURS PRN
Status: DISCONTINUED | OUTPATIENT
Start: 2024-07-25 | End: 2024-08-02 | Stop reason: HOSPADM

## 2024-07-25 RX ORDER — TALC
3 POWDER (GRAM) TOPICAL DAILY
Status: DISCONTINUED | OUTPATIENT
Start: 2024-07-25 | End: 2024-08-02 | Stop reason: HOSPADM

## 2024-07-25 RX ORDER — AMIODARONE HYDROCHLORIDE 200 MG/1
200 TABLET ORAL DAILY
Status: DISCONTINUED | OUTPATIENT
Start: 2024-07-25 | End: 2024-08-02 | Stop reason: HOSPADM

## 2024-07-25 RX ADMIN — AMIODARONE HYDROCHLORIDE 200 MG: 200 TABLET ORAL at 17:55

## 2024-07-25 RX ADMIN — Medication 3 MG: at 17:56

## 2024-07-25 RX ADMIN — Medication 3 L/MIN: at 08:00

## 2024-07-25 RX ADMIN — POTASSIUM CHLORIDE 40 MEQ: 1.5 POWDER, FOR SOLUTION ORAL at 10:07

## 2024-07-25 RX ADMIN — HYDROXYZINE HYDROCHLORIDE 25 MG: 25 TABLET ORAL at 04:40

## 2024-07-25 RX ADMIN — METOPROLOL SUCCINATE 50 MG: 50 TABLET, EXTENDED RELEASE ORAL at 20:02

## 2024-07-25 RX ADMIN — METOPROLOL TARTRATE 25 MG: 25 TABLET, FILM COATED ORAL at 10:07

## 2024-07-25 RX ADMIN — AMLODIPINE BESYLATE 5 MG: 5 TABLET ORAL at 08:43

## 2024-07-25 RX ADMIN — METOPROLOL TARTRATE 25 MG: 25 TABLET, FILM COATED ORAL at 08:43

## 2024-07-25 RX ADMIN — ATORVASTATIN CALCIUM 10 MG: 10 TABLET, FILM COATED ORAL at 20:02

## 2024-07-25 RX ADMIN — ALLOPURINOL 300 MG: 300 TABLET ORAL at 08:43

## 2024-07-25 RX ADMIN — POTASSIUM CHLORIDE 40 MEQ: 1.5 POWDER, FOR SOLUTION ORAL at 20:02

## 2024-07-25 RX ADMIN — MAGNESIUM SULFATE HEPTAHYDRATE 1 G: 1 INJECTION, SOLUTION INTRAVENOUS at 11:07

## 2024-07-25 RX ADMIN — OLANZAPINE 5 MG: 5 TABLET, ORALLY DISINTEGRATING ORAL at 20:02

## 2024-07-25 RX ADMIN — ENOXAPARIN SODIUM 40 MG: 40 INJECTION SUBCUTANEOUS at 08:43

## 2024-07-25 RX ADMIN — ASPIRIN 81 MG 81 MG: 81 TABLET ORAL at 08:43

## 2024-07-25 ASSESSMENT — COGNITIVE AND FUNCTIONAL STATUS - GENERAL
HELP NEEDED FOR BATHING: A LOT
WALKING IN HOSPITAL ROOM: A LOT
DRESSING REGULAR UPPER BODY CLOTHING: A LOT
CLIMB 3 TO 5 STEPS WITH RAILING: TOTAL
MOVING FROM LYING ON BACK TO SITTING ON SIDE OF FLAT BED WITH BEDRAILS: A LITTLE
DRESSING REGULAR LOWER BODY CLOTHING: TOTAL
MOBILITY SCORE: 13
DAILY ACTIVITIY SCORE: 12
DAILY ACTIVITIY SCORE: 14
MOVING FROM LYING ON BACK TO SITTING ON SIDE OF FLAT BED WITH BEDRAILS: A LITTLE
MOVING TO AND FROM BED TO CHAIR: A LOT
TURNING FROM BACK TO SIDE WHILE IN FLAT BAD: A LOT
WALKING IN HOSPITAL ROOM: TOTAL
TOILETING: A LOT
EATING MEALS: A LOT
CLIMB 3 TO 5 STEPS WITH RAILING: A LOT
DRESSING REGULAR LOWER BODY CLOTHING: A LOT
TURNING FROM BACK TO SIDE WHILE IN FLAT BAD: A LOT
STANDING UP FROM CHAIR USING ARMS: TOTAL
MOBILITY SCORE: 10
HELP NEEDED FOR BATHING: A LOT
DRESSING REGULAR UPPER BODY CLOTHING: A LITTLE
PERSONAL GROOMING: A LOT
MOVING TO AND FROM BED TO CHAIR: A LOT
TOILETING: A LOT
PERSONAL GROOMING: A LOT
STANDING UP FROM CHAIR USING ARMS: A LOT

## 2024-07-25 ASSESSMENT — PAIN - FUNCTIONAL ASSESSMENT
PAIN_FUNCTIONAL_ASSESSMENT: 0-10
PAIN_FUNCTIONAL_ASSESSMENT: 0-10

## 2024-07-25 ASSESSMENT — PAIN SCALES - GENERAL
PAINLEVEL_OUTOF10: 0 - NO PAIN
PAINLEVEL_OUTOF10: 0 - NO PAIN

## 2024-07-25 NOTE — CARE PLAN
Problem: Knowledge Deficit  Goal: Patient/family/caregiver demonstrates understanding of disease process, treatment plan, medications, and discharge instructions  Outcome: Progressing     Problem: Mechanical Ventilation  Goal: Patient Will Maintain Patent Airway  Outcome: Progressing  Goal: Oral health is maintained or improved  Outcome: Progressing  Goal: ET tube will be managed safely  Outcome: Progressing  Goal: Ability to express needs and understand communication  Outcome: Progressing  Goal: Mobility/activity is maintained at optimum level for patient  Outcome: Progressing     Problem: Pain - Adult  Goal: Verbalizes/displays adequate comfort level or baseline comfort level  Outcome: Progressing     Problem: Safety - Adult  Goal: Free from fall injury  Outcome: Progressing     Problem: Discharge Planning  Goal: Discharge to home or other facility with appropriate resources  Outcome: Progressing     Problem: Chronic Conditions and Co-morbidities  Goal: Patient's chronic conditions and co-morbidity symptoms are monitored and maintained or improved  Outcome: Progressing     Problem: Skin  Goal: Decreased wound size/increased tissue granulation at next dressing change  Outcome: Progressing  Goal: Participates in plan/prevention/treatment measures  Outcome: Progressing  Goal: Prevent/manage excess moisture  Outcome: Progressing  Goal: Prevent/minimize sheer/friction injuries  Outcome: Progressing  Goal: Promote/optimize nutrition  Outcome: Progressing  Goal: Promote skin healing  Outcome: Progressing     Problem: Nutrition  Goal: Less than 5 days NPO/clear liquids  Outcome: Progressing  Goal: Oral intake greater than 50%  Outcome: Progressing  Goal: Oral intake greater 75%  Outcome: Progressing  Goal: Consume prescribed supplement  Outcome: Progressing  Goal: Adequate PO fluid intake  Outcome: Progressing  Goal: Nutrition support goals are met within 48 hrs  Outcome: Progressing  Goal: Nutrition support is meeting  "75% of nutrient needs  Outcome: Progressing  Goal: Tube feed tolerance  Outcome: Progressing  Goal: BG  mg/dL  Outcome: Progressing  Goal: Lab values WNL  Outcome: Progressing  Goal: Electrolytes WNL  Outcome: Progressing  Goal: Promote healing  Outcome: Progressing  Goal: Maintain stable weight  Outcome: Progressing     Problem: Fall/Injury  Goal: Not fall by end of shift  Outcome: Progressing  Goal: Be free from injury by end of the shift  Outcome: Progressing  Goal: Verbalize understanding of personal risk factors for fall in the hospital  Outcome: Progressing  Goal: Verbalize understanding of risk factor reduction measures to prevent injury from fall in the home  Outcome: Progressing  Goal: Use assistive devices by end of the shift  Outcome: Progressing  Goal: Pace activities to prevent fatigue by end of the shift  Outcome: Progressing     Problem: Risk for falls  Goal: I will remain free from falls  Outcome: Progressing     Problem: ADLs  Goal: Pt will complete ADL tasks at mod I with use of AE prn   Outcome: Progressing   The patient's goals for the shift include  \"not sure\"    The clinical goals for the shift include patient safety, no falls this shift.    Over the shift, the patient did not make progress toward the following goals. Barriers to progression include continual confusion. Recommendations to address these barriers include reorientation, bed alarm, safety sitter at bedside.    "

## 2024-07-25 NOTE — CONSULTS
Inpatient consult to Psychiatry  Consult performed by: JONG Corrales-NADER  Consult ordered by: Amanda Goins MD  Reason for consult: Agitation          History Of Present Illness  Nilam Powell is a 73 y.o. female presenting with Cardiac arrest (Multi).     The patient's chart was reviewed and discussed with the assigned RN and other nursing members involved in the patient's care. We introduced ourselves to the patient and asked for permission to ask a few questions, to which the patient was agreeable.  RN reported agitation and that the patient was not following directions was covered in stool and not allowing staff to assist. Patient was exhibiting unsafe behaviors and attempting to get off the bed frequently and staff had to restrain for patients safety and prevent falls, sitter at bedside.    During the interaction, the patient believed she was in her brother's house and stated that it was her neighborhood, expressing a sense of safety. However, she was unable to provide us with the current date and believed it was 1976. The patient denied feeling depressed but had difficulty following our conversation. After orienting the patient to the date and the fact that she was in the hospital, we asked if she was feeling anxious, to which she denied.    The patient reported good sleep and requested to be released from her soft restraints. However, at the time of our interaction, she remained on two-point soft wrist restraints due to her inability to follow directions and attempts to get off the bed. When asked if she understood what was happening, the patient was unable to comprehend the situation.    The patient reported having a good appetite and denied experiencing any hallucinations, although she mentioned the presence of five people in the room, including her brother and another family member. During our interaction, myself, a sitter , and the patient were present in the room and no family  present.    The patient denied having any suicidal or homicidal thoughts or any history of suicide attempts. She also denied having any pain. The patient stated that she lives at home with her three children and  and denied smoking, alcohol use, drug use, or any psychiatric history. She also denied any history of abuse, trauma, or trouble with the law.    At this time, the patient appears to be delirious, and we will be recommending delirium precautions to follow at the end of this note. We appreciate being involved in the patient's case and will continue to follow up with them tomorrow.     Past Medical History  She has no past medical history on file.    Surgical History  She has a past surgical history that includes Cardiac catheterization (N/A, 7/23/2024).     Social History  She reports that she has never smoked. She has never been exposed to tobacco smoke. She has never used smokeless tobacco. No history on file for alcohol use and drug use.    Abuse/Trauma  none    Past Treatment   Inpatient: Denies    Outpatient: none      Family Psychiatric History  There has been no family history of psychological problems    Past Medications  None found per chart review    Substance Abuse  Denied by patient        Access to Fire Arms and/or Weapons: denies    Legal Issues: Denies     Allergies  Patient has no known allergies.    Review of Systems   Unable to perform ROS: Acuity of condition         Mental Status Exam  General:  Confused, in hospital attire in 2 pt soft wrist restraints     Appearance: Disheveled.  Attitude/Behavior:Difficult to engage., Distracted., and Poor eye contact.  Motor Activity: No psychomotor agitation or retardation, no tremor or other abnormal movements.  Speech: incoherent speech, hesitant, minimal conversation, and poor articulation  Mood: anxious and irritable  Affect: flat, increased in intensity, and increased in range  Thought Process: tangential and gross disorganization  Thought  Content: Delusions and Mood incongruent  Thought Perception: Denies auditory hallucination, but appears to be responding to internal stimuli  Cognition: Attention: Unable to assess due to confusion and irritable   Insight: limited  Judgement: Limited Tenuous    Psychiatric Risk Assessment  Violence Risk Assessment: other Delirium   Acute Risk of Harm to Others is Considered: low and moderate   Suicide Risk Assessment: age > 65 yrs old  Protective Factors against Suicide: adherence to  treatment, moral objections to suicide, and positive family relationships  Acute Risk of Harm to Self is Considered: low    Current Medications    Scheduled medications  allopurinol, 300 mg, oral, Daily  amiodarone, 200 mg, oral, Daily  amLODIPine, 5 mg, oral, Daily  aspirin, 81 mg, oral, Daily  atorvastatin, 10 mg, oral, Nightly  enoxaparin, 40 mg, subcutaneous, Daily  metoprolol succinate XL, 50 mg, oral, BID  oxygen, , inhalation, Continuous - 02/gases  polyethylene glycol, 17 g, oral, Daily  potassium chloride, 40 mEq, oral, BID  sennosides-docusate sodium, 1 tablet, oral, BID      Continuous medications     PRN medications  PRN medications: acetaminophen, bisacodyl, dextrose, dextrose, glucagon, glucagon, hydrOXYzine HCL, melatonin, oxygen, QUEtiapine         Relevant Results        @[facility]    Relevant Results  Results for orders placed or performed during the hospital encounter of 07/19/24 (from the past 24 hour(s))   Basic metabolic panel   Result Value Ref Range    Glucose 99 65 - 99 mg/dL    Sodium 141 133 - 145 mmol/L    Potassium 3.2 (L) 3.4 - 5.1 mmol/L    Chloride 103 97 - 107 mmol/L    Bicarbonate 25 24 - 31 mmol/L    Urea Nitrogen 17 8 - 25 mg/dL    Creatinine 1.20 0.40 - 1.60 mg/dL    eGFR 48 (L) >60 mL/min/1.73m*2    Calcium 9.0 8.5 - 10.4 mg/dL    Anion Gap 13 <=19 mmol/L   CBC and Auto Differential   Result Value Ref Range    WBC 14.2 (H) 4.4 - 11.3 x10*3/uL    nRBC 0.0 0.0 - 0.0 /100 WBCs    RBC 4.28 4.00 - 5.20  x10*6/uL    Hemoglobin 11.7 (L) 12.0 - 16.0 g/dL    Hematocrit 36.7 36.0 - 46.0 %    MCV 86 80 - 100 fL    MCH 27.3 26.0 - 34.0 pg    MCHC 31.9 (L) 32.0 - 36.0 g/dL    RDW 15.6 (H) 11.5 - 14.5 %    Platelets 193 150 - 450 x10*3/uL    Neutrophils % 76.6 40.0 - 80.0 %    Immature Granulocytes %, Automated 1.4 (H) 0.0 - 0.9 %    Lymphocytes % 11.0 13.0 - 44.0 %    Monocytes % 8.9 2.0 - 10.0 %    Eosinophils % 1.6 0.0 - 6.0 %    Basophils % 0.5 0.0 - 2.0 %    Neutrophils Absolute 10.84 (H) 1.60 - 5.50 x10*3/uL    Immature Granulocytes Absolute, Automated 0.20 0.00 - 0.50 x10*3/uL    Lymphocytes Absolute 1.55 0.80 - 3.00 x10*3/uL    Monocytes Absolute 1.26 (H) 0.05 - 0.80 x10*3/uL    Eosinophils Absolute 0.23 0.00 - 0.40 x10*3/uL    Basophils Absolute 0.07 0.00 - 0.10 x10*3/uL   Magnesium   Result Value Ref Range    Magnesium 1.80 1.60 - 3.10 mg/dL   Phosphorus   Result Value Ref Range    Phosphorus 2.5 2.5 - 4.5 mg/dL   AST   Result Value Ref Range    AST 24 5 - 40 U/L   ALT   Result Value Ref Range    ALT 38 5 - 40 U/L   POCT GLUCOSE   Result Value Ref Range    POCT Glucose 90 74 - 99 mg/dL   POCT GLUCOSE   Result Value Ref Range    POCT Glucose 123 (H) 74 - 99 mg/dL   POCT GLUCOSE   Result Value Ref Range    POCT Glucose 113 (H) 74 - 99 mg/dL      Reviewed     Assessment/Plan   Principal Problem:    Cardiac arrest (Multi)  Active Problems:    Cardiac arrest with ventricular fibrillation (Multi)       Plan/Recommendations  - Patient lacks the capacity to leave AMA at this time and thus cannot leave AMA. Call [facility]IOLET if patient attempts to leave AMA.    1) Agitation  Likely due to delirium superimposed on underlying cognitive impairment.    Plan:  - Discontinue Seroquel 25 mg PRN for agitation.  - Start Zyprexa 5 mg PO at bedtime for mood stabilization,improve sleep and increase appetite   - Order olanzapine 2.5 mg PO/ 2.5mg IM Q6H PRN for agitation  - Start melatonin 3 mg at 1800 to aid sleep-wake cycle  - Continue  Hydroxyzine 25mg Q6H PRN for anxiety, restlessness     Would recommend the use of delirium precautions with this patient:   -- Minimize use of benzos, opiates, anticholinergics, as these may worsen mental status   -- Would use caution with narcotic pain medications   -- Would still adequately controlling pain, as uncontrolled pain is also a risk factor for delirium   -- Reinforce sleep hygiene; encourage patient to stay awake during the day   -- Keep curtains/blinds open during the day and closed at night.   -- Would recommend reorienting/redirecting patient as much as possible,    -- Aim for consistent staffing, familiar objects, avoiding bright lights and loud noises, etc.    Medication Consent  Medication Consent: risks, benefits, side effects reviewed for all ordered meds    I personally spent 75 minutes today providing care for this patient, including preparation, face to face time, documentation and other services such as review of medical records, diagnostic result, patient education, counseling, coordination of care as specified in the encounter. Discussed side effects with opportunity to ask questions and questions answered.  Patient given consent to the plan as noted.    Parts of this chart have been completed using voice recognition software.  Please excuse any errors of transcription.  Despite the medical decision making time stamp, my medical decision making has taken place during the patient's entire visit.  Thought process and reason for plan has been formulated from the time that I saw the patient until the time of disposition and is not specific to one specific moment during their visit and furthermore the medical decision making encompasses the entire chart and not only that represented in this note.     Diana Kessler, APRN-CNP

## 2024-07-25 NOTE — PROGRESS NOTES
Nilam Powell is a 73 y.o. female on day 6 of admission presenting with Cardiac arrest (Multi).  Patient was initially admitted to ICU after she sustained cardiac arrest/ventricular fibrillation in the field, he had return of spontaneous circulation.  Per patient was intubated and then subsequently extubated.  Patient had cardiac cath on July 23, no obstructive lesion.  Ejection fraction is preserved according to the echo.  Patient demonstrates some degree of anoxic encephalopathy.  She was evaluated by neurologist, and was to perform EEG, I do not see results available yet.  Patient is on Seroquel.  She is on metoprolol 25 mg twice a day.  Electrophysiology was consulted and they were planning to place AICD but according to the notes, they were hoping to see some improvement in patient's mental status for proceeding with AICD placement.    Subjective   I received a phone call from the nurse that patient has tachycardia this morning.  Heart rate varies between 110 and 140.  During exam, patient denies any chest pain, shortness of breath, palpitations she admits to very mild cough, denies sputum production.       Objective     Last Recorded Vitals  /78 (BP Location: Left arm, Patient Position: Lying)   Pulse (!) 138   Temp 36.3 °C (97.3 °F) (Temporal)   Resp 17   Wt 97.6 kg (215 lb 2.7 oz)   SpO2 96%   Intake/Output last 3 Shifts:    Intake/Output Summary (Last 24 hours) at 7/25/2024 0945  Last data filed at 7/25/2024 0925  Gross per 24 hour   Intake 725 ml   Output 750 ml   Net -25 ml       Admission Weight  Weight: 117 kg (258 lb 6.1 oz) (07/19/24 1251)    Daily Weight  07/25/24 : 97.6 kg (215 lb 2.7 oz)    Image Results  XR chest 1 view  Narrative: Interpreted By:  William Bagley,   STUDY:  XR CHEST 1 VIEW; 7/23/2024 11:38 am      INDICATION:  Signs/Symptoms:hypoxemic resp insuffuciency, assess lung fields      COMPARISON:  07/21/2024.      ACCESSION NUMBER(S):  [id number]      ORDERING  CLINICIAN:  LARISSA ANNE      FINDINGS:  The study is limited due to rotation and respiratory motion.  The cardiac silhouette appears mildly prominent, exaggerated by the  technique. The aorta is tortuous.  Previously seen nasogastric tube and endotracheal tube have been  removed; there is stable position of right jugular central line.  There is improving retrocardiac density. There is no pneumothorax.  Degenerative changes involve the spine.      Impression: Limited study. Satisfactory appearance of the chest status post  extubation.      Signed by: William Bagley 7/23/2024 11:43 AM  Dictation workstation:   [id number]      Physical Exam  Location: Stepdown, bed 7A.  Patient's son is at bedside.  Patient is in her bed.  She is cooperative with exam she is alert and alert and oriented to name and place.  She knows that she is in the hospital but cannot give me name of the hospital.  She has no idea why she is here, does not remember being told that she had any heart problems.  Overall, she is comfortable, in no apparent distress.  Does not look toxic.  Face is symmetrical.  Moves all extremities.  Lungs are clear to auscultation bilaterally.  Heart: Irregular tachycardic S1-S2.  Abdomen is soft.  Bowel sounds positive.  Extremities: No peripheral edema, cords, cyanosis.    Relevant Results               Assessment/Plan        This patient has a central line   Reason for the central line remaining today? Line unnecessary, will be removed today            Principal Problem:    Cardiac arrest (Multi)  Active Problems:    Cardiac arrest with ventricular fibrillation (Multi)    Status postcardiac arrest with ventricular fibrillation.  Patient has preserved ejection fraction and normal coronaries.  She is currently on metoprolol 25 mg twice a day.  I discussed patient case with electrophysiology nurse practitioner.  They will review the case and decide about amiodarone.  AICD placement.  EP.  They are waiting for  improvement of anoxic encephalopathy.  Tachycardia.  I reviewed monitor strips.  I am not sure that patient is in atrial fibrillation it looks like she may have underlying sinus rhythm with frequent PACs and PVCs some episodes of SVT.  Discussed with cardiology.  They recommended to give patient extra dose of oral metoprolol. They will decide about possible loading with Amiodarone.  Anoxic encephalopathy.  Followed by neurologist.  Was supposed to have an EEG done.  Leukocytosis.  Patient was treated with antibiotics in the ICU.  She denies any cough, sputum production, any signs of infection.  Will continue monitoring off antibiotics.  DVT prophylaxis Lovenox  Acute respiratory failure with hypoxia and hypercapnia status post intubation, was successfully extubated.  Currently stable on nasal cannula.  Continue weaning as tolerated.  Hypokalemia.  Will replace.  Our goal is potassium level 4 or above.  Will give 1 g of magnesium sulfate IV to keep magnesium level 2 or above.              Amanda Goins MD

## 2024-07-25 NOTE — PROGRESS NOTES
Speech-Language Pathology    Speech-Language Pathology Clinical Swallow Treatment    Patient Name: Nilam Powell  MRN: [id number]  : 1950  Today's Date: 24  Start Time: 910  Stop Time: 923  Time Calculation (min): 13 min    ASSESSMENT  SLP TX Intervention Outcome: Making Progress Towards Goals  Treatment Tolerance: Patient tolerated treatment well    Impressions: Pt feeding self, safely tolerating regular solids and thin liquids without s/s aspiration. No further skilled ST indicated.    PLAN  Recommended solid consistency: Regular (IDDSI level 7)  Recommended liquid consistency: Thin (IDDSI 0)  Recommended medication administration: Whole, in thin liquid  Safe swallow strategies:  - Upright positioning for all PO intake    Discharge recommendation:  no further SLP  Inpatient/Swing Bed or Outpatient: Inpatient  SLP TX Plan: Discharge from Speech Therapy  SLP Plan: No skilled SLP          SUBJECTIVE  Prior to Session Communication: Bedside nurse  RN cleared pt to participate in session   Respiratory status: Supplemental oxygen via NC  Positioning: Upright in bed  Pt was alert, cooperative, and pleasantly confused for session.    Pain Assessment  Pain Assessment: 0-10  0-10 (Numeric) Pain Score: 0 - No pain       Orientation: Oriented to self  Ability to follow functional commands: WFL    OBJECTIVE  Therapeutic Swallow  Therapeutic Swallow Intervention : PO Trials, Compensatory Strategies, Caregiver Education    Goals (start date 24. Anticipated time frame for goal attainment: ): 2 weeks  Pt will consume prescribed diet (current diet is easy to chew) without overt s/sx aspiration/penetration in 95% of observed trials.  GOAL START:   2024                                            GOAL END: 22              Status: goal met              Progress this date: Pt feeding self, just completed breakfast,  sitter @ bedside, Pt consumed 4 oz thin liquids via straw, one roxane cracker. Mastication  WFL adequate oral clearance, no s/s aspiration  in 95% of observed trials  Pt will consume trials of upgraded textures without overt s/sx aspiration in order for consideration of diet texture upgrade.              GOAL START:   7/22/2024                                            GOAL END: 8/5/22              Status: goal met              Progress this date: Pt is safely tolerating regular solids, eggs, pond for breakfast without difficulty per sitter and son who are @ bedside independently.  GOAL START:   7/22/2024                                            GOAL END: 8/5/22  Pt will demonstrate follow-through of trained compensatory strategies during a meal/snack with 90% acc independently.               Status: Progressing              Progress this date: Pt demonstrated follow-through of safe swallow strategies, single bites/sips, seated upright during snack of roxane cracker and orange juice with 90% acc independently  Treatment/Education:  Results and recommendations were relayed to: Patient, Family, and Bedside nurse  Education provided: Yes   Learner: Patient and Family   Barriers to learning: Cognitive limitations barrier   Method of teaching: Verbal   Topic: role of ST and results of assessment   Outcome of teaching: Pt/family demonstrated good understanding, Pt demonstrated partial understanding, and Needs reinforcement

## 2024-07-25 NOTE — PROGRESS NOTES
Occupational Therapy                 Therapy Communication Note    Patient Name: Nilam Powell  MRN: [id number]  Today's Date: 7/25/2024     Discipline: Occupational Therapy    Missed Visit Reason: Missed Visit Reason: Other (Comment) (Per RN-hold treatment this morning d/t extended episode of tachycardia. Will attempt when appropriate/cleared)    Missed Time: Attempt    Comment:

## 2024-07-25 NOTE — PROGRESS NOTES
Both HHC and SNF list left at bedside with son, states he will give to his dad to look at     ** do not discharge without speaking to care coordination**    Wendy Cano RN

## 2024-07-25 NOTE — PROGRESS NOTES
Occupational Therapy                 Therapy Communication Note    Patient Name: Nilam Powell  MRN: [id number]  Today's Date: 7/25/2024     Discipline: Occupational Therapy    Missed Visit Reason: Missed Visit Reason: Other (Comment) (attempt x2; PCNA/sitter present performing pericare hygiene d/t incontinent of BM)    Missed Time: Attempt    Comment:

## 2024-07-25 NOTE — CARE PLAN
The patient's goals for the shift include      The clinical goals for the shift include patient remain safe with no injuries during this shift    Over the shift, the patient did make progress toward the following goals.    Problem: Knowledge Deficit  Goal: Patient/family/caregiver demonstrates understanding of disease process, treatment plan, medications, and discharge instructions  Outcome: Progressing     Problem: Mechanical Ventilation  Goal: Patient Will Maintain Patent Airway  Outcome: Progressing  Goal: Oral health is maintained or improved  Outcome: Progressing  Goal: ET tube will be managed safely  Outcome: Progressing  Goal: Ability to express needs and understand communication  Outcome: Progressing  Goal: Mobility/activity is maintained at optimum level for patient  Outcome: Progressing     Problem: Pain - Adult  Goal: Verbalizes/displays adequate comfort level or baseline comfort level  Outcome: Progressing     Problem: Safety - Adult  Goal: Free from fall injury  Outcome: Progressing     Problem: Discharge Planning  Goal: Discharge to home or other facility with appropriate resources  Outcome: Progressing     Problem: Chronic Conditions and Co-morbidities  Goal: Patient's chronic conditions and co-morbidity symptoms are monitored and maintained or improved  Outcome: Progressing     Problem: Skin  Goal: Decreased wound size/increased tissue granulation at next dressing change  Outcome: Progressing  Goal: Participates in plan/prevention/treatment measures  Outcome: Progressing  Goal: Prevent/manage excess moisture  Outcome: Progressing  Goal: Prevent/minimize sheer/friction injuries  Outcome: Progressing  Goal: Promote/optimize nutrition  Outcome: Progressing  Goal: Promote skin healing  Outcome: Progressing     Problem: Nutrition  Goal: Less than 5 days NPO/clear liquids  Outcome: Progressing  Goal: Oral intake greater than 50%  Outcome: Progressing  Goal: Oral intake greater 75%  Outcome:  Progressing  Goal: Consume prescribed supplement  Outcome: Progressing  Goal: Adequate PO fluid intake  Outcome: Progressing  Goal: Nutrition support goals are met within 48 hrs  Outcome: Progressing  Goal: Nutrition support is meeting 75% of nutrient needs  Outcome: Progressing  Goal: Tube feed tolerance  Outcome: Progressing  Goal: BG  mg/dL  Outcome: Progressing  Goal: Lab values WNL  Outcome: Progressing  Goal: Electrolytes WNL  Outcome: Progressing  Goal: Promote healing  Outcome: Progressing  Goal: Maintain stable weight  Outcome: Progressing     Problem: Fall/Injury  Goal: Not fall by end of shift  Outcome: Progressing  Goal: Be free from injury by end of the shift  Outcome: Progressing  Goal: Verbalize understanding of personal risk factors for fall in the hospital  Outcome: Progressing  Goal: Verbalize understanding of risk factor reduction measures to prevent injury from fall in the home  Outcome: Progressing  Goal: Use assistive devices by end of the shift  Outcome: Progressing  Goal: Pace activities to prevent fatigue by end of the shift  Outcome: Progressing     Problem: Risk for falls  Goal: I will remain free from falls  Outcome: Progressing     Problem: ADLs  Goal: Pt will complete ADL tasks at mod I with use of AE prn   Outcome: Progressing

## 2024-07-25 NOTE — PROGRESS NOTES
Physical Therapy                 Therapy Communication Note    Patient Name: Nilam Powell  MRN: [id number]  Today's Date: 7/25/2024     Discipline: Physical Therapy    Missed Visit Reason: Missed Visit Reason: Other (Comment) (Attempted tx, sitter/PCA in to perform hygiene due to incontinent of BM.)    Missed Time: Attempt

## 2024-07-25 NOTE — PROGRESS NOTES
"Nilam Powell is a 73 y.o. female on day 6 of admission presenting with Cardiac arrest (Multi).    Subjective   Confused      Objective     Physical Exam  No sob, conversive but confused and a bit agitated  Lungs clear per report no documented murmurs  Extremities visualized without edema  Telemetry shows short bursts of atrial fibrillation PVCs mostly sinus rhythm PACs VPB's nonsustained VT SHERYL consisting of 3 and 4 beat runs  Last Recorded Vitals  Blood pressure (!) 139/97, pulse 81, temperature 37.5 °C (99.5 °F), temperature source Temporal, resp. rate 17, height 1.702 m (5' 7\"), weight 97.6 kg (215 lb 2.7 oz), SpO2 95%.  Intake/Output last 3 Shifts:  I/O last 3 completed shifts:  In: 850 (8.7 mL/kg) [P.O.:150; IV Piggyback:700]  Out: 1525 (15.6 mL/kg) [Urine:1525 (0.4 mL/kg/hr)]  Weight: 97.6 kg     Relevant Results  Results for orders placed or performed during the hospital encounter of 07/19/24 (from the past 24 hour(s))   Basic metabolic panel   Result Value Ref Range    Glucose 99 65 - 99 mg/dL    Sodium 141 133 - 145 mmol/L    Potassium 3.2 (L) 3.4 - 5.1 mmol/L    Chloride 103 97 - 107 mmol/L    Bicarbonate 25 24 - 31 mmol/L    Urea Nitrogen 17 8 - 25 mg/dL    Creatinine 1.20 0.40 - 1.60 mg/dL    eGFR 48 (L) >60 mL/min/1.73m*2    Calcium 9.0 8.5 - 10.4 mg/dL    Anion Gap 13 <=19 mmol/L   CBC and Auto Differential   Result Value Ref Range    WBC 14.2 (H) 4.4 - 11.3 x10*3/uL    nRBC 0.0 0.0 - 0.0 /100 WBCs    RBC 4.28 4.00 - 5.20 x10*6/uL    Hemoglobin 11.7 (L) 12.0 - 16.0 g/dL    Hematocrit 36.7 36.0 - 46.0 %    MCV 86 80 - 100 fL    MCH 27.3 26.0 - 34.0 pg    MCHC 31.9 (L) 32.0 - 36.0 g/dL    RDW 15.6 (H) 11.5 - 14.5 %    Platelets 193 150 - 450 x10*3/uL    Neutrophils % 76.6 40.0 - 80.0 %    Immature Granulocytes %, Automated 1.4 (H) 0.0 - 0.9 %    Lymphocytes % 11.0 13.0 - 44.0 %    Monocytes % 8.9 2.0 - 10.0 %    Eosinophils % 1.6 0.0 - 6.0 %    Basophils % 0.5 0.0 - 2.0 %    Neutrophils Absolute 10.84 " (H) 1.60 - 5.50 x10*3/uL    Immature Granulocytes Absolute, Automated 0.20 0.00 - 0.50 x10*3/uL    Lymphocytes Absolute 1.55 0.80 - 3.00 x10*3/uL    Monocytes Absolute 1.26 (H) 0.05 - 0.80 x10*3/uL    Eosinophils Absolute 0.23 0.00 - 0.40 x10*3/uL    Basophils Absolute 0.07 0.00 - 0.10 x10*3/uL   Magnesium   Result Value Ref Range    Magnesium 1.80 1.60 - 3.10 mg/dL   Phosphorus   Result Value Ref Range    Phosphorus 2.5 2.5 - 4.5 mg/dL   AST   Result Value Ref Range    AST 24 5 - 40 U/L   ALT   Result Value Ref Range    ALT 38 5 - 40 U/L   POCT GLUCOSE   Result Value Ref Range    POCT Glucose 90 74 - 99 mg/dL   POCT GLUCOSE   Result Value Ref Range    POCT Glucose 123 (H) 74 - 99 mg/dL   POCT GLUCOSE   Result Value Ref Range    POCT Glucose 113 (H) 74 - 99 mg/dL          Assessment/Plan   Principal Problem:    Cardiac arrest (Multi)  Active Problems:    Cardiac arrest with ventricular fibrillation (Multi)         Expand All Collapse All    Subjective Data:  Confused though answers     Overnight Events:    Confused      Objective Data:  Last Recorded Vitals:  Vitals          Vitals:     07/23/24 1549 07/23/24 1659 07/23/24 1700 07/23/24 1727   BP:     (!) 122/92 (!) 187/109   BP Location:           Patient Position:           Pulse:     64 67   Resp:     18 20   Temp: 37 °C (98.6 °F)         TempSrc: Temporal         SpO2:   95% 95% 94%   Weight:           Height:                    Last Labs:  CBC - 7/23/2024:  4:43 AM  15.6 10.5 180    33.5       CMP - 7/23/2024:  4:43 AM  8.6 6.8 101 --- 0.5   3.3 3.2 91 122       PTT - 7/23/2024: 12:31 PM      1.0   11.0 48.4              HGBA1C   Date/Time Value Ref Range Status   06/29/2023 09:52 AM 5.7 4.3 - 5.6 % Final       Comment:       American Diabetes Association guidelines indicate that patients with HgbA1c in the range 5.7-6.4% are at increased risk for development of diabetes, and intervention by lifestyle modification may be beneficial. HgbA1c greater or equal to  "6.5% is considered diagnostic of diabetes.   12/03/2021 02:00 PM 6.0 4.3 - 5.6 % Final       Comment:       American Diabetes Association guidelines indicate that patients with HgbA1c in   the range 5.7-6.4% are at increased risk for development of diabetes, and   intervention by lifestyle modification may be beneficial. HgbA1c greater or   equal to 6.5% is considered diagnostic of diabetes.      Last I/O:  I/O last 3 completed shifts:  In: 603.5 (6 mL/kg) [I.V.:203.5 (2 mL/kg); IV Piggyback:400]  Out: 2470 (24.4 mL/kg) [Urine:2470 (0.7 mL/kg/hr)]  Weight: 101.1 kg      Past Cardiology Tests (Last 3 Years):  EKG:  ECG 12 lead 07/19/2024        ECG 12 lead 07/19/2024     Echo:  No results found for this or any previous visit from the past 1095 days.     Ejection Fractions:  No results found for: \"EF\"  Cath:  No results found for this or any previous visit from the past 1095 days.     Stress Test:  No results found for this or any previous visit from the past 1095 days.     Cardiac Imaging:  No results found for this or any previous visit from the past 1095 days.        Inpatient Medications:  Scheduled Medications          Scheduled medications   Medication Dose Route Frequency    aspirin  81 mg oral Daily    atorvastatin  10 mg oral Nightly    insulin lispro  0-5 Units subcutaneous q4h    metoprolol  5 mg intravenous q6h    oxygen   inhalation Continuous - Inhalation    oxygen   inhalation Continuous - 02/gases    piperacillin-tazobactam  4.5 g intravenous q6h    polyethylene glycol  17 g oral Daily    sennosides-docusate sodium  1 tablet oral BID         PRN Medications       PRN medications   Medication    acetaminophen    bisacodyl    dextrose    dextrose    glucagon    glucagon    heparin (porcine)    melatonin    OLANZapine         Continuous Medications         Continuous Medications   Medication Dose Last Rate    heparin  0-4,000 Units/hr 1,100 Units/hr (07/23/24 1337)            Physical Exam:  Confused, " oriented to self, neck veins are not elevated, upstrokes and volumes normal, lungs diminished anteriorly, normal sounding S1 single S2 no cardiac murmurs, PMI nondisplaced, extremities without edema        Assessment/Plan  73-year-old female presents with cardiac arrest defibrillated w/ROSC, initially intubated with encephalopathy, extubated 7/21 with improvement in mental status though not back to baseline     Would work to maintain potassium 4.0 and above  Continue with beta-blocker transition to oral if able  Anatomy to be evaluated tomorrow  If no culprit lesion would recommend ICD for secondary prevention, would repeat EKG to reassess QRS for biventricular indication   7/23:  Underwent cardiac catheterization without any obstructive lesion requiring revascularization  Preserved left ventricular function  Tolerating beta-blocker, occasional VPB's  Neurology continuing to evaluate, EEG done today to rule out seizures  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  Will continue to follow in terms of placement of ICD for secondary prevention, would like to see mental status and confusion improved with possible  Be best to maintain potassium greater than 4.0, magnesium greater than 2.0  Utilize beta-blocker orally as able  7/25:  -Not as somnolent though confused w/some agitation today    -neurology following no evidence for seizures, suspect more anoxic in nature  -K+continues to run low, supplementing, would start daily maintenance dose   -telemetry with some fast rates very brief what appears to be A-fib mostly though sinus rhythm PACs and VPB's  -VS w/elevated BP to support increase in beta-blocker dose  -LFTs elevated at admission, will repeat now if not elevated will start amiodarone 200 mg daily  -In terms of initial event patient would be in guideline of ICD for secondary prevention,  watching if mental status starts to clears further, speaking w/ family re: device   Consider  psychiatric evaluation for med management w/ agitation so as not to sedate patient to avoid worsening confusion                  I spent 45 minutes in the professional and overall care of this patient.      Grace Russ, APRN-CNP

## 2024-07-26 LAB
ANION GAP SERPL CALC-SCNC: 11 MMOL/L
BASOPHILS # BLD AUTO: 0.07 X10*3/UL (ref 0–0.1)
BASOPHILS NFR BLD AUTO: 0.5 %
BUN SERPL-MCNC: 20 MG/DL (ref 8–25)
CALCIUM SERPL-MCNC: 9.1 MG/DL (ref 8.5–10.4)
CHLORIDE SERPL-SCNC: 106 MMOL/L (ref 97–107)
CO2 SERPL-SCNC: 26 MMOL/L (ref 24–31)
CREAT SERPL-MCNC: 1.3 MG/DL (ref 0.4–1.6)
EGFRCR SERPLBLD CKD-EPI 2021: 44 ML/MIN/1.73M*2
EOSINOPHIL # BLD AUTO: 0.3 X10*3/UL (ref 0–0.4)
EOSINOPHIL NFR BLD AUTO: 2 %
ERYTHROCYTE [DISTWIDTH] IN BLOOD BY AUTOMATED COUNT: 15.9 % (ref 11.5–14.5)
GLUCOSE BLD MANUAL STRIP-MCNC: 102 MG/DL (ref 74–99)
GLUCOSE BLD MANUAL STRIP-MCNC: 195 MG/DL (ref 74–99)
GLUCOSE BLD MANUAL STRIP-MCNC: 86 MG/DL (ref 74–99)
GLUCOSE SERPL-MCNC: 100 MG/DL (ref 65–99)
HCT VFR BLD AUTO: 38.3 % (ref 36–46)
HGB BLD-MCNC: 12.1 G/DL (ref 12–16)
IMM GRANULOCYTES # BLD AUTO: 0.22 X10*3/UL (ref 0–0.5)
IMM GRANULOCYTES NFR BLD AUTO: 1.5 % (ref 0–0.9)
LYMPHOCYTES # BLD AUTO: 1.74 X10*3/UL (ref 0.8–3)
LYMPHOCYTES NFR BLD AUTO: 11.7 %
MAGNESIUM SERPL-MCNC: 2 MG/DL (ref 1.6–3.1)
MCH RBC QN AUTO: 27.4 PG (ref 26–34)
MCHC RBC AUTO-ENTMCNC: 31.6 G/DL (ref 32–36)
MCV RBC AUTO: 87 FL (ref 80–100)
MONOCYTES # BLD AUTO: 1.36 X10*3/UL (ref 0.05–0.8)
MONOCYTES NFR BLD AUTO: 9.2 %
NEUTROPHILS # BLD AUTO: 11.13 X10*3/UL (ref 1.6–5.5)
NEUTROPHILS NFR BLD AUTO: 75.1 %
NRBC BLD-RTO: 0 /100 WBCS (ref 0–0)
PHOSPHATE SERPL-MCNC: 3.4 MG/DL (ref 2.5–4.5)
PLATELET # BLD AUTO: 194 X10*3/UL (ref 150–450)
POTASSIUM SERPL-SCNC: 3.7 MMOL/L (ref 3.4–5.1)
RBC # BLD AUTO: 4.41 X10*6/UL (ref 4–5.2)
SODIUM SERPL-SCNC: 143 MMOL/L (ref 133–145)
WBC # BLD AUTO: 14.8 X10*3/UL (ref 4.4–11.3)

## 2024-07-26 PROCEDURE — 97535 SELF CARE MNGMENT TRAINING: CPT | Mod: GO,CO

## 2024-07-26 PROCEDURE — 99232 SBSQ HOSP IP/OBS MODERATE 35: CPT

## 2024-07-26 PROCEDURE — 97110 THERAPEUTIC EXERCISES: CPT | Mod: GO,CO

## 2024-07-26 PROCEDURE — 2500000001 HC RX 250 WO HCPCS SELF ADMINISTERED DRUGS (ALT 637 FOR MEDICARE OP): Performed by: INTERNAL MEDICINE

## 2024-07-26 PROCEDURE — 82947 ASSAY GLUCOSE BLOOD QUANT: CPT

## 2024-07-26 PROCEDURE — 2500000001 HC RX 250 WO HCPCS SELF ADMINISTERED DRUGS (ALT 637 FOR MEDICARE OP)

## 2024-07-26 PROCEDURE — 2500000001 HC RX 250 WO HCPCS SELF ADMINISTERED DRUGS (ALT 637 FOR MEDICARE OP): Performed by: REGISTERED NURSE

## 2024-07-26 PROCEDURE — 2500000001 HC RX 250 WO HCPCS SELF ADMINISTERED DRUGS (ALT 637 FOR MEDICARE OP): Performed by: STUDENT IN AN ORGANIZED HEALTH CARE EDUCATION/TRAINING PROGRAM

## 2024-07-26 PROCEDURE — 85025 COMPLETE CBC W/AUTO DIFF WBC: CPT | Performed by: STUDENT IN AN ORGANIZED HEALTH CARE EDUCATION/TRAINING PROGRAM

## 2024-07-26 PROCEDURE — 2500000005 HC RX 250 GENERAL PHARMACY W/O HCPCS: Performed by: STUDENT IN AN ORGANIZED HEALTH CARE EDUCATION/TRAINING PROGRAM

## 2024-07-26 PROCEDURE — 83735 ASSAY OF MAGNESIUM: CPT | Performed by: STUDENT IN AN ORGANIZED HEALTH CARE EDUCATION/TRAINING PROGRAM

## 2024-07-26 PROCEDURE — 2500000002 HC RX 250 W HCPCS SELF ADMINISTERED DRUGS (ALT 637 FOR MEDICARE OP, ALT 636 FOR OP/ED)

## 2024-07-26 PROCEDURE — 80048 BASIC METABOLIC PNL TOTAL CA: CPT | Performed by: STUDENT IN AN ORGANIZED HEALTH CARE EDUCATION/TRAINING PROGRAM

## 2024-07-26 PROCEDURE — 84100 ASSAY OF PHOSPHORUS: CPT | Performed by: STUDENT IN AN ORGANIZED HEALTH CARE EDUCATION/TRAINING PROGRAM

## 2024-07-26 PROCEDURE — 2060000001 HC INTERMEDIATE ICU ROOM DAILY

## 2024-07-26 PROCEDURE — 2500000002 HC RX 250 W HCPCS SELF ADMINISTERED DRUGS (ALT 637 FOR MEDICARE OP, ALT 636 FOR OP/ED): Performed by: REGISTERED NURSE

## 2024-07-26 PROCEDURE — 2500000004 HC RX 250 GENERAL PHARMACY W/ HCPCS (ALT 636 FOR OP/ED): Performed by: STUDENT IN AN ORGANIZED HEALTH CARE EDUCATION/TRAINING PROGRAM

## 2024-07-26 RX ORDER — POTASSIUM CHLORIDE 1.5 G/1.58G
20 POWDER, FOR SOLUTION ORAL
Status: DISCONTINUED | OUTPATIENT
Start: 2024-07-26 | End: 2024-08-02 | Stop reason: HOSPADM

## 2024-07-26 RX ADMIN — METOPROLOL SUCCINATE 50 MG: 50 TABLET, EXTENDED RELEASE ORAL at 09:13

## 2024-07-26 RX ADMIN — ALLOPURINOL 300 MG: 300 TABLET ORAL at 09:13

## 2024-07-26 RX ADMIN — SENNOSIDES AND DOCUSATE SODIUM 1 TABLET: 50; 8.6 TABLET ORAL at 20:57

## 2024-07-26 RX ADMIN — Medication 3 MG: at 18:26

## 2024-07-26 RX ADMIN — METOPROLOL SUCCINATE 50 MG: 50 TABLET, EXTENDED RELEASE ORAL at 20:57

## 2024-07-26 RX ADMIN — ASPIRIN 81 MG 81 MG: 81 TABLET ORAL at 09:13

## 2024-07-26 RX ADMIN — POTASSIUM CHLORIDE 20 MEQ: 1.5 POWDER, FOR SOLUTION ORAL at 18:26

## 2024-07-26 RX ADMIN — ATORVASTATIN CALCIUM 10 MG: 10 TABLET, FILM COATED ORAL at 20:57

## 2024-07-26 RX ADMIN — Medication 3 L/MIN: at 08:00

## 2024-07-26 RX ADMIN — AMLODIPINE BESYLATE 5 MG: 5 TABLET ORAL at 09:13

## 2024-07-26 RX ADMIN — OLANZAPINE 5 MG: 5 TABLET, ORALLY DISINTEGRATING ORAL at 20:57

## 2024-07-26 RX ADMIN — AMIODARONE HYDROCHLORIDE 200 MG: 200 TABLET ORAL at 09:13

## 2024-07-26 RX ADMIN — ENOXAPARIN SODIUM 40 MG: 40 INJECTION SUBCUTANEOUS at 09:13

## 2024-07-26 RX ADMIN — POTASSIUM CHLORIDE 20 MEQ: 1.5 POWDER, FOR SOLUTION ORAL at 10:08

## 2024-07-26 ASSESSMENT — COGNITIVE AND FUNCTIONAL STATUS - GENERAL
TURNING FROM BACK TO SIDE WHILE IN FLAT BAD: A LOT
DAILY ACTIVITIY SCORE: 12
CLIMB 3 TO 5 STEPS WITH RAILING: A LOT
MOVING FROM LYING ON BACK TO SITTING ON SIDE OF FLAT BED WITH BEDRAILS: A LOT
PERSONAL GROOMING: A LOT
TOILETING: TOTAL
EATING MEALS: A LITTLE
DRESSING REGULAR UPPER BODY CLOTHING: A LITTLE
HELP NEEDED FOR BATHING: A LOT
DRESSING REGULAR UPPER BODY CLOTHING: A LITTLE
STANDING UP FROM CHAIR USING ARMS: A LOT
HELP NEEDED FOR BATHING: A LOT
DRESSING REGULAR LOWER BODY CLOTHING: TOTAL
MOVING TO AND FROM BED TO CHAIR: A LOT
PERSONAL GROOMING: A LITTLE
WALKING IN HOSPITAL ROOM: A LOT
MOBILITY SCORE: 12
EATING MEALS: A LITTLE
TOILETING: TOTAL
DAILY ACTIVITIY SCORE: 13
DRESSING REGULAR LOWER BODY CLOTHING: TOTAL

## 2024-07-26 ASSESSMENT — ENCOUNTER SYMPTOMS
CONFUSION: 0
HALLUCINATIONS: 0
FATIGUE: 1
NERVOUS/ANXIOUS: 1
ARTHRALGIAS: 0
MYALGIAS: 0
SLEEP DISTURBANCE: 1
HYPERACTIVE: 0
WEAKNESS: 1
DYSPHORIC MOOD: 0
AGITATION: 0
ACTIVITY CHANGE: 1
DECREASED CONCENTRATION: 1

## 2024-07-26 ASSESSMENT — PAIN SCALES - GENERAL
PAINLEVEL_OUTOF10: 0 - NO PAIN

## 2024-07-26 ASSESSMENT — ACTIVITIES OF DAILY LIVING (ADL): HOME_MANAGEMENT_TIME_ENTRY: 15

## 2024-07-26 ASSESSMENT — PAIN - FUNCTIONAL ASSESSMENT
PAIN_FUNCTIONAL_ASSESSMENT: 0-10

## 2024-07-26 NOTE — CARE PLAN
The patient's goals for the shift include      The clinical goals for the shift include reamin free of falls throughout the shift    Over the shift, the patient did make progress toward the following goals.     Problem: Knowledge Deficit  Goal: Patient/family/caregiver demonstrates understanding of disease process, treatment plan, medications, and discharge instructions  Outcome: Progressing     Problem: Mechanical Ventilation  Goal: Patient Will Maintain Patent Airway  Outcome: Progressing  Goal: Oral health is maintained or improved  Outcome: Progressing  Goal: ET tube will be managed safely  Outcome: Progressing  Goal: Ability to express needs and understand communication  Outcome: Progressing  Goal: Mobility/activity is maintained at optimum level for patient  Outcome: Progressing     Problem: Pain - Adult  Goal: Verbalizes/displays adequate comfort level or baseline comfort level  Outcome: Progressing     Problem: Safety - Adult  Goal: Free from fall injury  Outcome: Progressing     Problem: Discharge Planning  Goal: Discharge to home or other facility with appropriate resources  Outcome: Progressing     Problem: Chronic Conditions and Co-morbidities  Goal: Patient's chronic conditions and co-morbidity symptoms are monitored and maintained or improved  Outcome: Progressing     Problem: Skin  Goal: Decreased wound size/increased tissue granulation at next dressing change  Outcome: Progressing  Goal: Participates in plan/prevention/treatment measures  Outcome: Progressing  Goal: Prevent/manage excess moisture  Outcome: Progressing  Goal: Prevent/minimize sheer/friction injuries  Outcome: Progressing  Goal: Promote/optimize nutrition  Outcome: Progressing  Goal: Promote skin healing  Outcome: Progressing     Problem: Nutrition  Goal: Less than 5 days NPO/clear liquids  Outcome: Progressing  Goal: Oral intake greater than 50%  Outcome: Progressing  Goal: Oral intake greater 75%  Outcome: Progressing  Goal: Consume  prescribed supplement  Outcome: Progressing  Goal: Adequate PO fluid intake  Outcome: Progressing  Goal: Nutrition support goals are met within 48 hrs  Outcome: Progressing  Goal: Nutrition support is meeting 75% of nutrient needs  Outcome: Progressing  Goal: Tube feed tolerance  Outcome: Progressing  Goal: BG  mg/dL  Outcome: Progressing  Goal: Lab values WNL  Outcome: Progressing  Goal: Electrolytes WNL  Outcome: Progressing  Goal: Promote healing  Outcome: Progressing  Goal: Maintain stable weight  Outcome: Progressing     Problem: Fall/Injury  Goal: Not fall by end of shift  Outcome: Progressing  Goal: Be free from injury by end of the shift  Outcome: Progressing  Goal: Verbalize understanding of personal risk factors for fall in the hospital  Outcome: Progressing  Goal: Verbalize understanding of risk factor reduction measures to prevent injury from fall in the home  Outcome: Progressing  Goal: Use assistive devices by end of the shift  Outcome: Progressing  Goal: Pace activities to prevent fatigue by end of the shift  Outcome: Progressing     Problem: Risk for falls  Goal: I will remain free from falls  Outcome: Progressing     Problem: ADLs  Goal: Pt will complete ADL tasks at mod I with use of AE prn   Outcome: Progressing

## 2024-07-26 NOTE — CARE PLAN
The patient's goals for the shift include      The clinical goals for the shift include patient remain safe with no injuries during this shift      Problem: Knowledge Deficit  Goal: Patient/family/caregiver demonstrates understanding of disease process, treatment plan, medications, and discharge instructions  Outcome: Progressing     Problem: Mechanical Ventilation  Goal: Patient Will Maintain Patent Airway  Outcome: Progressing  Goal: Oral health is maintained or improved  Outcome: Progressing  Goal: ET tube will be managed safely  Outcome: Progressing  Goal: Ability to express needs and understand communication  Outcome: Progressing  Goal: Mobility/activity is maintained at optimum level for patient  Outcome: Progressing     Problem: Pain - Adult  Goal: Verbalizes/displays adequate comfort level or baseline comfort level  Outcome: Progressing

## 2024-07-26 NOTE — PROGRESS NOTES
Nilam Powell is a 73 y.o. female on day 7 of admission presenting with Cardiac arrest (Multi).      Subjective   The patient's chart reviewed and discussed with their assigned RN.     During today's encounter with the patient, there have been notable improvements. The patient appears to be more alert and awake, and there is no need for restraints.Able to follow some directions  however, a sitter  remains present for safety concerns due to the patient's impulsive behavior at times without any behavioral disturbances.    The patient reported not recalling meeting with this provider yesterday but mentioned having a good night's sleep. She also reports much better appetite today.  She is aware of her location today and correctly identifies being in the hospital, although she mistakenly mentioned [facility] initially but later corrected it to . The patient denies experiencing any auditory or visual hallucinations, which is a positive change from yesterday when she was experiencing visual hallucinations. Today, the patient maintains good eye contact, is engaged, and has a pleasant mood. She is able to follow the conversation much better than yesterday when she was highly distracted and exhibited disorganized thoughts. However, some confusion still remains present.    The patient continues to deny having any suicidal ideation or suicidal thoughts. She also denies experiencing any pain. She mentions feeling fatigued but overall reports improvement. We provided the patient with the opportunity to ask questions or voice any concerns, but she did not have any at this time.    We appreciate being involved in the patient's case and will continue to follow up with her next week if the patient is still in the hospital. In the meantime, we will continue to recommend delirium precautions and continuation of the established medication regimen at the end of this note.         Objective     Last Recorded  "Vitals  Blood pressure 134/86, pulse 90, temperature 36.2 °C (97.2 °F), temperature source Temporal, resp. rate 17, height 1.702 m (5' 7\"), weight 98 kg (216 lb 0.8 oz), SpO2 94%.    Review of Systems   Constitutional:  Positive for activity change and fatigue.   Musculoskeletal:  Negative for arthralgias and myalgias.   Neurological:  Positive for weakness.   Psychiatric/Behavioral:  Positive for decreased concentration and sleep disturbance. Negative for agitation, behavioral problems, confusion, dysphoric mood, hallucinations, self-injury and suicidal ideas. The patient is nervous/anxious. The patient is not hyperactive.        Psychiatric ROS - Adult  Anxiety: decreased anxiety  Depression: concentration and energy no depression  Delirium: negative  Psychosis: negative  Kathy: negative  Safety Issues: none  Psychiatric ROS Comment: As noted      Mental Status Exam  General: alert and awake in hospital attire     Appearance: Fairly groomed.  Attitude/Behavior:Cooperative, conversant, engaged, and with good eye contact.  Motor Activity: No psychomotor agitation or retardation, no tremor or other abnormal movements.  Speech: normal rate, tone and rhythm and soft  Mood: euthymic  Affect: mood-congruent and redirectable  Thought Process: linear, goal directed and circumstantial  Thought Content: Within normal limits  Thought Perception: No perceptual abnormalities noted  Cognition: Cognitively intact to conversational testing with respect to attention, orientation, fund of knowledge, recent and remote memory, and language.  Insight: fair  Judgement: Fair    Psychiatric Risk Assessment  Violence Risk Assessment: other Delirium   Acute Risk of Harm to Others is Considered: low and moderate   Suicide Risk Assessment: age > 65 yrs old  Protective Factors against Suicide: adherence to  treatment, moral objections to suicide, and positive family relationships  Acute Risk of Harm to Self is Considered: low    Current " Medications    Scheduled medications  allopurinol, 300 mg, oral, Daily  amiodarone, 200 mg, oral, Daily  amLODIPine, 5 mg, oral, Daily  aspirin, 81 mg, oral, Daily  atorvastatin, 10 mg, oral, Nightly  enoxaparin, 40 mg, subcutaneous, Daily  melatonin, 3 mg, oral, Daily  metoprolol succinate XL, 50 mg, oral, BID  OLANZapine zydis, 5 mg, oral, Nightly  oxygen, , inhalation, Continuous - 02/gases  polyethylene glycol, 17 g, oral, Daily  potassium chloride, 20 mEq, oral, BID  sennosides-docusate sodium, 1 tablet, oral, BID      Continuous medications     PRN medications  PRN medications: acetaminophen, bisacodyl, dextrose, dextrose, glucagon, glucagon, hydrOXYzine HCL, OLANZapine, OLANZapine, oxygen       Medication efficacy: yes mood improved  Patient reporting any side effects? No  Any observed side effects? No      Relevant Results  Results for orders placed or performed during the hospital encounter of 07/19/24 (from the past 24 hour(s))   POCT GLUCOSE   Result Value Ref Range    POCT Glucose 113 (H) 74 - 99 mg/dL   POCT GLUCOSE   Result Value Ref Range    POCT Glucose 160 (H) 74 - 99 mg/dL   Basic metabolic panel   Result Value Ref Range    Glucose 100 (H) 65 - 99 mg/dL    Sodium 143 133 - 145 mmol/L    Potassium 3.7 3.4 - 5.1 mmol/L    Chloride 106 97 - 107 mmol/L    Bicarbonate 26 24 - 31 mmol/L    Urea Nitrogen 20 8 - 25 mg/dL    Creatinine 1.30 0.40 - 1.60 mg/dL    eGFR 44 (L) >60 mL/min/1.73m*2    Calcium 9.1 8.5 - 10.4 mg/dL    Anion Gap 11 <=19 mmol/L   CBC and Auto Differential   Result Value Ref Range    WBC 14.8 (H) 4.4 - 11.3 x10*3/uL    nRBC 0.0 0.0 - 0.0 /100 WBCs    RBC 4.41 4.00 - 5.20 x10*6/uL    Hemoglobin 12.1 12.0 - 16.0 g/dL    Hematocrit 38.3 36.0 - 46.0 %    MCV 87 80 - 100 fL    MCH 27.4 26.0 - 34.0 pg    MCHC 31.6 (L) 32.0 - 36.0 g/dL    RDW 15.9 (H) 11.5 - 14.5 %    Platelets 194 150 - 450 x10*3/uL    Neutrophils % 75.1 40.0 - 80.0 %    Immature Granulocytes %, Automated 1.5 (H) 0.0 - 0.9 %     Lymphocytes % 11.7 13.0 - 44.0 %    Monocytes % 9.2 2.0 - 10.0 %    Eosinophils % 2.0 0.0 - 6.0 %    Basophils % 0.5 0.0 - 2.0 %    Neutrophils Absolute 11.13 (H) 1.60 - 5.50 x10*3/uL    Immature Granulocytes Absolute, Automated 0.22 0.00 - 0.50 x10*3/uL    Lymphocytes Absolute 1.74 0.80 - 3.00 x10*3/uL    Monocytes Absolute 1.36 (H) 0.05 - 0.80 x10*3/uL    Eosinophils Absolute 0.30 0.00 - 0.40 x10*3/uL    Basophils Absolute 0.07 0.00 - 0.10 x10*3/uL   Magnesium   Result Value Ref Range    Magnesium 2.00 1.60 - 3.10 mg/dL   Phosphorus   Result Value Ref Range    Phosphorus 3.4 2.5 - 4.5 mg/dL   POCT GLUCOSE   Result Value Ref Range    POCT Glucose 102 (H) 74 - 99 mg/dL   POCT GLUCOSE   Result Value Ref Range    POCT Glucose 195 (H) 74 - 99 mg/dL               Reviewed    Assessment/Plan   Principal Problem:    Cardiac arrest (Multi)  Active Problems:    Cardiac arrest with ventricular fibrillation (Multi)    Plan/Recommendations  - Patient lacks the capacity to leave AMA at this time and thus cannot leave AMA. Call CODE VIOLET if patient attempts to leave AMA.     1) Agitation  Likely due to delirium superimposed on underlying cognitive impairment.      Plan:    - Continue Zyprexa 5 mg PO at bedtime for mood stabilization,improve sleep and increase appetite   - Can give olanzapine 2.5 mg PO/ 2.5mg IM Q6H PRN for agitation  - Continue melatonin 3 mg at 1800 to aid sleep-wake cycle  - Continue Hydroxyzine 25mg Q6H PRN for anxiety, restlessness     Would recommend the use of delirium precautions with this patient:   -- Minimize use of benzos, opiates, anticholinergics, as these may worsen mental status   -- Would use caution with narcotic pain medications   -- Would still adequately controlling pain, as uncontrolled pain is also a risk factor for delirium   -- Reinforce sleep hygiene; encourage patient to stay awake during the day   -- Keep curtains/blinds open during the day and closed at night.   -- Would  recommend reorienting/redirecting patient as much as possible,    -- Aim for consistent staffing, familiar objects, avoiding bright lights and loud noises, etc.     Medication Consent  Medication Consent: risks, benefits, side effects reviewed for all ordered meds           I personally spent 35 minutes today providing care for this patient, including preparation, face to face time, documentation and other services such as review of medical records, diagnostic result, patient education, counseling, coordination of care as specified in the encounter. Discussed side effects with opportunity to ask questions and questions answered.  Patient given consent to the plan as noted.     Parts of this chart have been completed using voice recognition software.  Please excuse any errors of transcription.  Despite the medical decision making time stamp, my medical decision making has taken place during the patient's entire visit.  Thought process and reason for plan has been formulated from the time that I saw the patient until the time of disposition and is not specific to one specific moment during their visit and furthermore the medical decision making encompasses the entire chart and not only that represented in this note.     Diana eKssler, APRN-CNP

## 2024-07-26 NOTE — PROGRESS NOTES
Occupational Therapy                 Therapy Communication Note    Patient Name: Nilam Powell  MRN: [id number]  Today's Date: 7/26/2024     Discipline: Occupational Therapy    Missed Visit Reason: Missed Visit Reason: Other (Comment) (Treatment attempted with patient pleasantly eating meal seated in bed and requesting to finish. Will return at later time)    Missed Time: Attempt    Comment:

## 2024-07-26 NOTE — NURSING NOTE
UPDATE 2311: Asked Dr. Harper for sitter orders to be placed    UPDATE 0458: Patient continuous to be impulsive, forgetful and attempts to remove medical equipment and pull at central lines.

## 2024-07-26 NOTE — PROGRESS NOTES
Occupational Therapy    OT Treatment    Patient Name: Nilam Powell  MRN: [id number]  Today's Date: 7/26/2024  Time Calculation  Start Time: 1125  Stop Time: 1150  Time Calculation (min): 25 min        Assessment:  OT Assessment: Tolerated session well, demonstrating improved functional tolerance with increased cues required for safety awareness throughout session. Pt continuing to demonstrating increased confusion this date. Pt would benefit from continued skilled OT services to improve strength, balance, and functional tolerance to increase independence with ADL tasks  End of Session Communication: Bedside nurse  End of Session Patient Position: Up in chair, Alarm off, caregiver present  OT Assessment Results: Decreased ADL status, Decreased cognition, Decreased endurance, Decreased safe judgment during ADL, Decreased functional mobility, Decreased trunk control for functional activities  Plan:  Treatment Interventions: ADL retraining, Functional transfer training, UE strengthening/ROM, Endurance training, Cognitive reorientation, Patient/family training, Equipment evaluation/education, Compensatory technique education  OT Frequency: 5 times per week  OT Discharge Recommendations: Moderate intensity level of continued care  Equipment Recommended upon Discharge: Wheeled walker  OT Recommended Transfer Status: Assist of 2, Moderate assist  OT - OK to Discharge: Yes  Treatment Interventions: ADL retraining, Functional transfer training, UE strengthening/ROM, Endurance training, Cognitive reorientation, Patient/family training, Equipment evaluation/education, Compensatory technique education    Subjective   Previous Visit Info:  OT Last Visit  OT Received On: 07/26/24  General:  Family/Caregiver Present: Yes  Caregiver Feedback: sitter present  Prior to Session Communication: Bedside nurse  Patient Position Received: Bed, 3 rail up, Alarm off, caregiver present  General Comment: Cleared for therapy per RN. Pt supine in  bed upon arrival and agreeable to tx, pleasantly confused  Precautions:  Hearing/Visual Limitations: Glasses  Medical Precautions: Fall precautions, Oxygen therapy device and L/min (3L O2 nc)  Vital Signs:  Vital Signs  Heart Rate: 90 (HR elevated to 140s during functional task, descreased with seated rest)  SpO2: 94 % (88% upon arrival with nc doffed upon arrival, donned 3 L increased to 94%)  Pain:  Pain Assessment  Pain Assessment: 0-10  0-10 (Numeric) Pain Score: 0 - No pain    Objective    Cognition:  Cognition  Overall Cognitive Status: Impaired  Orientation Level: Disoriented to situation, Disoriented to time  Following Commands: Follows one step commands with repetition  Safety Judgment: Decreased awareness of need for assistance  Cognition Comments: pt expressing concerns that she is having a baby with re-orientation provided that she is in fact not pregnant, increased confusion throughout tx  Safety/Judgement: Exceptions to WFL  Insight: Moderate  Impulsive: Mildly  Processing Speed: Delayed    Activities of Daily Living: Feeding  Feeding Comments: s/u for drinking task with cues to pace task    LE Dressing  Sock Level of Assistance: Dependent  LE Dressing Where Assessed: Edge of bed  LE Dressing Comments: assist to don socks completely    Toileting  Toileting Level of Assistance: Dependent  Where Assessed: Bed level  Toileting Comments: demonstrating urine incontinence upon stand, being placed on bedpan in chair with increased amount of urine output noted requiring dependent pericare hygiene    Bed Mobility/Transfers: Bed Mobility  Bed Mobility: Yes  Bed Mobility 1  Bed Mobility 1: Supine to sitting  Level of Assistance 1: Minimum assistance, Minimal verbal cues  Bed Mobility Comments 1: assist with BLE advancement off EOB with cues for use of bed rail for UE support with increased time and effort required for task completion    Transfers  Transfer: Yes  Transfer 1  Technique 1: Sit to stand, Stand to  sit  Transfer Device 1: Walker  Transfer Level of Assistance 1: Moderate assistance, Moderate verbal cues  Trials/Comments 1: assist for trunk elevation and forward weightshifting with cues for proper hand placement, demonstrating decreased eccentric lowering to chair  Transfers 2  Technique 2: Sit to stand, Stand to sit  Transfer Device 2: Walker  Transfer Level of Assistance 2: Moderate assistance, Moderate verbal cues  Trials/Comments 2: assist for trunk elevation with cues for proper hand placement with failed attempt x1 requiring additional cues for postural alignment once standing.    Toilet Transfers  Toilet Transfer Type: To and from  Toilet Transfers Comments: assist to place bedpan under pt in chair with increased time spent for toileting task    Functional Mobility:  Functional Mobility  Functional Mobility Performed: Yes  Functional Mobility 1  Device 1: Rolling walker  Assistance 1: Moderate assistance  Comments 1: tolerated taking steps to chair at FWW with cues for postural alignment and step/walker sequencing, demonstrating slight retropulsion with fair-/poor balance    Standing Balance:  Static Standing Balance  Static Standing-Level of Assistance: Moderate assistance  Static Standing-Comment/Number of Minutes: increased assist in standing d/t fair-/poor balance during transfer tasks and rear pericare hygiene  Therapy/Activity: Therapeutic Exercise  Therapeutic Exercise Performed: Yes  Therapeutic Exercise Activity 1: participated in AROM BUE exercises 4x10 in all planes to improve strength and functional tolerance to increase independence with transfer tasks with cues provided for proper muscle recruitement    Outcome Measures:[facility] Daily Activity  Putting on and taking off regular lower body clothing: Total  Bathing (including washing, rinsing, drying): A lot  Putting on and taking off regular upper body clothing: A little  Toileting, which includes using toilet, bedpan or urinal: Total  Taking  care of personal grooming such as brushing teeth: A little  Eating Meals: A little  Daily Activity - Total Score: 13    Education Documentation  Body Mechanics, taught by LINN Sargent at 7/26/2024 12:13 PM.  Learner: Patient  Readiness: Acceptance  Method: Explanation  Response: Verbalizes Understanding, Needs Reinforcement    ADL Training, taught by LINN Sargent at 7/26/2024 12:13 PM.  Learner: Patient  Readiness: Acceptance  Method: Explanation  Response: Verbalizes Understanding, Needs Reinforcement    Education Comments  No comments found.      Problem: ADLs  Goal: Pt will complete ADL tasks at mod I with use of AE prn   Outcome: Progressing     Problem: Functional Mobility  Goal: Pt will perform functional mobility household distances at mod I with use of RW.     Outcome: Progressing     Problem: OT Transfers  Goal: Pt will perform BUE exercises to improve strength and independence with functional transfers/ADL tasks.    Outcome: Progressing

## 2024-07-26 NOTE — PROGRESS NOTES
Yan Powell is a 73 y.o. female on day 7 of admission presenting with Cardiac arrest (Multi).  Patient was initially admitted to ICU after she sustained cardiac arrest/ventricular fibrillation in the field, he had return of spontaneous circulation.  Per patient was intubated and then subsequently extubated.  Patient had cardiac cath on July 23, no obstructive lesion.  Ejection fraction is preserved according to the echo.  Patient demonstrates some degree of anoxic encephalopathy.  She was evaluated by neurologist, and was to perform EEG, I do not see results available yet.  Patient is on Seroquel.  She is on metoprolol 25 mg twice a day.  Electrophysiology was consulted and they were planning to place AICD but according to the notes, they were hoping to see some improvement in patient's mental status for proceeding with AICD placement.    Subjective   Patient was agitated last night was trying to pull and remove the line and telemetry.  He has a sitter       Objective     Last Recorded Vitals  /80 (BP Location: Right arm, Patient Position: Lying)   Pulse 69   Temp 36.3 °C (97.3 °F) (Temporal)   Resp 16   Wt 98 kg (216 lb 0.8 oz)   SpO2 98%   Intake/Output last 3 Shifts:    Intake/Output Summary (Last 24 hours) at 7/26/2024 0831  Last data filed at 7/26/2024 0427  Gross per 24 hour   Intake 915 ml   Output 925 ml   Net -10 ml       Admission Weight  Weight: 117 kg (258 lb 6.1 oz) (07/19/24 1251)    Daily Weight  07/26/24 : 98 kg (216 lb 0.8 oz)    Image Results  Transthoracic Echo (TTE) [place] [id number]             Phone [phone]    TRANSTHORACIC ECHOCARDIOGRAM REPORT    Patient Name:      YAN POWELL         Reading Physician:    [id number] Juan Byrd MD  Study Date:        7/19/2024             Ordering Provider:    [id number] FLETCHER BAIRD                                                                  LOIS  MRN/PID:           [id number]              Fellow:  Accession#:        [id number]          Nurse:  Date of Birth/Age: 1950 / 73 years Sonographer:          Jennifer Childers RDCS  Gender:            F                     Additional Staff:  Height:            170.00 cm             Admit Date:  Weight:            117.00 kg             Admission Status:     Inpatient -                                                                 Routine  BSA / BMI:         2.25 m2 / 40.48 kg/m2 Department Location:  [facility]  Blood Pressure: 125 /72 mmHg    Study Type:    TRANSTHORACIC ECHO (TTE) COMPLETE  Diagnosis/ICD: Cardiac arrest, cause unspecified-I46.9  Indication:    cardiac arrest  CPT Codes:     Echo Complete w Full Doppler-[id number]    Patient History:  Pertinent History: Cardiac arrest vent hypotensive.    Study Detail: The following Echo studies were performed: 2D, M-Mode, Doppler and                color flow. Technically challenging study due to prominent lung                artifact, body habitus and patient lying in supine position.                Definity used as a contrast agent for endocardial border                definition. Total contrast used for this procedure was 2 mL via IV                push.       PHYSICIAN INTERPRETATION:  Left Ventricle: Left ventricular ejection fraction is mildly decreased, by visual estimate at 50%. There is global hypokinesis of the left ventricle with minor regional variations. The left ventricular cavity size is normal. Spectral Doppler shows a normal pattern of left ventricular diastolic filling.  Left Atrium: The left atrium is normal in size.  Right Ventricle: The right ventricle is normal in size. There is normal right ventricular global systolic function.  Right Atrium: The right atrium is normal in size.  Aortic Valve: The aortic valve appears structurally normal. The  aortic valve dimensionless index is 0.42. There is mild aortic valve regurgitation. The peak instantaneous gradient of the aortic valve is 15.7 mmHg. The mean gradient of the aortic valve is 9.0 mmHg.  Mitral Valve: The mitral valve is normal in structure. There is no evidence of mitral valve regurgitation.  Tricuspid Valve: The tricuspid valve is structurally normal. There is trace tricuspid regurgitation. The Doppler estimated RVSP is mildly elevated at 33.0 mmHg.  Pulmonic Valve: The pulmonic valve is structurally normal. There is physiologic pulmonic valve regurgitation.  Pericardium: There is no pericardial effusion noted.  Aorta: The aortic root is normal.  Systemic Veins: The inferior vena cava appears dilated. There is less than 50% IVC collapse with inspiration.       CONCLUSIONS:   1. Poorly visualized anatomical structures due to suboptimal image quality.   2. Left ventricular ejection fraction is mildly decreased, by visual estimate at 50%.   3. There is global hypokinesis of the left ventricle with minor regional variations.   4. There is normal right ventricular global systolic function.   5. No evidence of mitral valve regurgitation.   6. Mildly elevated RVSP.   7. Trace tricuspid regurgitation is visualized.   8. Mild aortic valve regurgitation.    QUANTITATIVE DATA SUMMARY:  2D MEASUREMENTS:                            Normal Ranges:  LAs:           2.80 cm    (2.7-4.0cm)  IVSd:          1.04 cm    (0.6-1.1cm)  LVPWd:         0.82 cm    (0.6-1.1cm)  LVIDd:         6.22 cm    (3.9-5.9cm)  LV Mass Index: 106.0 g/m2    LV SYSTOLIC FUNCTION BY 2D PLANIMETRY (MOD):                       Normal Ranges:  EF-A4C View:    50 % (>=55%)  EF-A2C View:    46 %  EF-Biplane:     50 %  EF-Visual:      50 %  LV EF Reported: 50 %    LV DIASTOLIC FUNCTION:                      Normal Ranges:  MV Peak E: 0.58 m/s (0.7-1.2 m/s)  MV Peak A: 0.82 m/s (0.42-0.7 m/s)  E/A Ratio: 0.71     (1.0-2.2)    MITRAL VALVE:                   Normal Ranges:  MV DT: 354 msec (150-240msec)    AORTIC VALVE:                                     Normal Ranges:  AoV Vmax:                1.98 m/s  (<=1.7m/s)  AoV Peak PG:             15.7 mmHg (<20mmHg)  AoV Mean P.0 mmHg  (1.7-11.5mmHg)  LVOT Max Dao:            0.78 m/s  (<=1.1m/s)  AoV VTI:                 39.70 cm  (18-25cm)  LVOT VTI:                16.50 cm  LVOT Diameter:           2.00 cm   (1.8-2.4cm)  AoV Area, VTI:           1.31 cm2  (2.5-5.5cm2)  AoV Area,Vmax:           1.23 cm2  (2.5-4.5cm2)  AoV Dimensionless Index: 0.42    TRICUSPID VALVE/RVSP:                              Normal Ranges:  Peak TR Velocity: 2.12 m/s  RV Syst Pressure: 33.0 mmHg (< 30mmHg)  IVC Diam:         2.27 cm    PULMONIC VALVE:                       Normal Ranges:  PV Max Dao: 0.7 m/s  (0.6-0.9m/s)  PV Max P.1 mmHg       [id number] Juan Byrd MD  Electronically signed on 2024 at 3:34:25 PM       ** Final **      Physical Exam  Location: Stepdown, bed 7A.  Patient is alert, confused.  Oriented to name only.  He has a sitter at bedside  Overall, she is comfortable, in no apparent distress.  Does not look toxic.  No cough according to the sitter  Face is symmetrical.  Moves all extremities.  Lungs are clear to auscultation bilaterally.  Heart: Irregular tachycardic S1-S2.  Abdomen is soft.  Bowel sounds positive.  Extremities: No peripheral edema, cords, cyanosis.    Relevant Results               Assessment/Plan        This patient has a central line   Reason for the central line remaining today? Line unnecessary, will be removed today            Principal Problem:    Cardiac arrest (Multi)  Active Problems:    Cardiac arrest with ventricular fibrillation (Multi)    Status postcardiac arrest with ventricular fibrillation.  Patient has preserved ejection fraction and normal coronaries.  She is currently on metoprolol 25 mg twice a day.  I discussed patient case with electrophysiology nurse  practitioner.  They will review the case and decide about amiodarone.  AICD placement.  EP.  They are waiting for improvement of anoxic encephalopathy.  Tachycardia.  I reviewed monitor strips.  I am not sure that patient is in atrial fibrillation it looks like she may have underlying sinus rhythm with frequent PACs and PVCs some episodes of SVT.  Discussed with cardiology.  They recommended to give patient extra dose of oral metoprolol. They will decide about possible loading with Amiodarone.  Anoxic encephalopathy.  Followed by neurologist.  Was supposed to have an EEG done.  Leukocytosis.  Patient was treated with antibiotics in the ICU.  She denies any cough, sputum production, any signs of infection.  Will continue monitoring off antibiotics.  DVT prophylaxis Lovenox  Acute respiratory failure with hypoxia and hypercapnia status post intubation, was successfully extubated.  Currently stable on nasal cannula.  Continue weaning as tolerated.  Hypokalemia.  Will replace.  Our goal is potassium level 4 or above.  Will give 1 g of magnesium sulfate IV to keep magnesium level 2 or above.       7//26/24:  Discussed with electrophysiology yesterday: The increased dose of metoprolol and started amiodarone 200 mg daily.  They still not sure about AICD placement during this admission due to significant anoxic encephalopathy.  They are planning to discuss that issue with patient's family.  Paroxysmal atrial fibrillation.  Patient is back to sinus rhythm.  Cardiology will decide about anticoagulation.  Hypokalemia, improved.  Continue maintenance  Leukocytosis.  Clinically, no obvious source of infection.  She does not have any cough.  She does not look septic.  She received Zosyn and full dose for 5 days in the ICU and has been discontinued.  Monitor off antibiotics.  May be stress related   Seen by psychiatry yesterday, appreciate recommendations and changes.    Amanda Goins MD

## 2024-07-26 NOTE — DOCUMENTATION CLARIFICATION NOTE
"    PATIENT:               YAN POWELL  ACCT #:                  [id number]  MRN:                       [id number]  :                       1950  ADMIT DATE:       2024 12:46 PM  DISCH DATE:  RESPONDING PROVIDER #:        [id number]          PROVIDER RESPONSE TEXT:    Sepsis 2/2 Pneumonia with renal organ dysfunction of KASH organ dysfunction    CDI QUERY TEXT:    Clarification        Instruction:  Based on your assessment of the patient and the clinical information, please provide the requested documentation by clicking on the appropriate radio button and enter any additional information if prompted.    Question: Is there a diagnosis indicative of a patient meeting SIRS criteria and with organ dysfunction in the setting of CDI TO ENTER infection    When answering this query, please exercise your independent professional judgment. The fact that a question is being asked, does not imply that any particular answer is desired or expected.    The patient's clinical indicators include:  Clinical Information:  Yan Powell is a 73 y.o. female on day 6 of admission presenting with Cardiac arrest (Multi).  Patient was initially admitted to ICU after she sustained cardiac arrest/ventricular fibrillation in the field, he had return of spontaneous circulation.   ED:  \"  Patient was successfully intubated although insidiously became hypotensive refractory to 30 cc/kg fluid resuscitation concerning for underlying infection/sepsis, subsequently started on nor epi with increasing rate for appropriate BP support.\"     H/P:  \"ID: mild leukocytosis, CXR with pulmonary infiltrates, overall some concern for sepsis.\"  \"h/o HTN, s/p cardiac arrest, now with shock, etiology unclear, septic vs cardiogenic. On presentation mildly elevated troponin. Likely demand ischemia. \"  \"Neuro: acute metabolic encephalopathy post arrest.\"  \"Likely demand ischemia.\"     Pharm Consult:  \"Yan Ribeiro is a 73 y.o. year old female who " "Pharmacy has been consulted for vancomycin dosing for pneumonia.\"    Clinical Indicators:  Indicators  onward or only  Admission VS:  ED:  104/15/140/128/98%  RR:  15///20/  HR;  104/117/74/60  WBC:  14.7/21.7/21.4/18/17.4/16.0  LA: 1.8  Troponin:  /47  VB.24/57/48/24.4  Creat:  1.8/1.3/1.2/1.10   CXR:  IMPRESSION:  1.Extensive bilateral multifocal airspace disease worse at the  right upper lung.Underlying pneumonia or atelectasis in the  differential.  2.ET tube and feeding tube in place.  -MR Brain WO:  \"IMPRESSION:  Examination is degraded by varying degrees of motion artifact.No  evidence of acute infarct or intracranial mass effect.Probable  mild-to-moderate chronic small vessel ischemic disease.\"    Treatment:  Vanco; Pharm Consult; Sepsis Bundle; Cards Consult; CT Head x2; MR Brain WO; Intubation; ICU; A-line; Hypothermia; Levophed    Risk Factors:  Age; HTN;  Options provided:  -- Sepsis 2/2 Pneumonia with renal organ dysfunction of KASH organ dysfunction  -- Sepsis 2/2 Pneumonia with cardiac organ dysfunction of Demand Ischemia organ dysfunction  -- Sepsis with neurological organ dysfunction of Metabolic/Anoxic Encephalopathy organ dysfunction  -- Sepsis with multi-system organ dysfunction of Acute Hypoxic/Hypercapnic Respiratory Failure, Metabolic/Anoxic Encephalopathy, KASH,  Demand Ischemia  -- Patient treated for Pneumonia without Sepsis  -- Other - I will add my own diagnosis  -- Refer to Clinical Documentation Reviewer    Query created by: Tami Crenshaw on 2024 2:38 PM      Electronically signed by:  JUSTIN BRITT MD 2024 9:37 AM          "

## 2024-07-27 LAB
ANION GAP SERPL CALC-SCNC: 11 MMOL/L
BASOPHILS # BLD AUTO: 0.06 X10*3/UL (ref 0–0.1)
BASOPHILS NFR BLD AUTO: 0.4 %
BUN SERPL-MCNC: 20 MG/DL (ref 8–25)
CALCIUM SERPL-MCNC: 8.9 MG/DL (ref 8.5–10.4)
CHLORIDE SERPL-SCNC: 107 MMOL/L (ref 97–107)
CO2 SERPL-SCNC: 24 MMOL/L (ref 24–31)
CREAT SERPL-MCNC: 1.3 MG/DL (ref 0.4–1.6)
EGFRCR SERPLBLD CKD-EPI 2021: 44 ML/MIN/1.73M*2
EOSINOPHIL # BLD AUTO: 0.25 X10*3/UL (ref 0–0.4)
EOSINOPHIL NFR BLD AUTO: 1.7 %
ERYTHROCYTE [DISTWIDTH] IN BLOOD BY AUTOMATED COUNT: 15.9 % (ref 11.5–14.5)
GLUCOSE SERPL-MCNC: 118 MG/DL (ref 65–99)
HCT VFR BLD AUTO: 37.9 % (ref 36–46)
HGB BLD-MCNC: 12.1 G/DL (ref 12–16)
IMM GRANULOCYTES # BLD AUTO: 0.25 X10*3/UL (ref 0–0.5)
IMM GRANULOCYTES NFR BLD AUTO: 1.7 % (ref 0–0.9)
LYMPHOCYTES # BLD AUTO: 1.42 X10*3/UL (ref 0.8–3)
LYMPHOCYTES NFR BLD AUTO: 9.5 %
MAGNESIUM SERPL-MCNC: 2 MG/DL (ref 1.6–3.1)
MCH RBC QN AUTO: 27.5 PG (ref 26–34)
MCHC RBC AUTO-ENTMCNC: 31.9 G/DL (ref 32–36)
MCV RBC AUTO: 86 FL (ref 80–100)
MONOCYTES # BLD AUTO: 1.06 X10*3/UL (ref 0.05–0.8)
MONOCYTES NFR BLD AUTO: 7.1 %
NEUTROPHILS # BLD AUTO: 11.95 X10*3/UL (ref 1.6–5.5)
NEUTROPHILS NFR BLD AUTO: 79.6 %
NRBC BLD-RTO: 0 /100 WBCS (ref 0–0)
PHOSPHATE SERPL-MCNC: 3.4 MG/DL (ref 2.5–4.5)
PLATELET # BLD AUTO: 201 X10*3/UL (ref 150–450)
POTASSIUM SERPL-SCNC: 4.2 MMOL/L (ref 3.4–5.1)
RBC # BLD AUTO: 4.4 X10*6/UL (ref 4–5.2)
SODIUM SERPL-SCNC: 142 MMOL/L (ref 133–145)
WBC # BLD AUTO: 15 X10*3/UL (ref 4.4–11.3)

## 2024-07-27 PROCEDURE — 80048 BASIC METABOLIC PNL TOTAL CA: CPT | Performed by: STUDENT IN AN ORGANIZED HEALTH CARE EDUCATION/TRAINING PROGRAM

## 2024-07-27 PROCEDURE — 2500000004 HC RX 250 GENERAL PHARMACY W/ HCPCS (ALT 636 FOR OP/ED): Performed by: STUDENT IN AN ORGANIZED HEALTH CARE EDUCATION/TRAINING PROGRAM

## 2024-07-27 PROCEDURE — 36415 COLL VENOUS BLD VENIPUNCTURE: CPT | Performed by: STUDENT IN AN ORGANIZED HEALTH CARE EDUCATION/TRAINING PROGRAM

## 2024-07-27 PROCEDURE — 2500000002 HC RX 250 W HCPCS SELF ADMINISTERED DRUGS (ALT 637 FOR MEDICARE OP, ALT 636 FOR OP/ED)

## 2024-07-27 PROCEDURE — 85025 COMPLETE CBC W/AUTO DIFF WBC: CPT | Performed by: STUDENT IN AN ORGANIZED HEALTH CARE EDUCATION/TRAINING PROGRAM

## 2024-07-27 PROCEDURE — 2500000001 HC RX 250 WO HCPCS SELF ADMINISTERED DRUGS (ALT 637 FOR MEDICARE OP): Performed by: INTERNAL MEDICINE

## 2024-07-27 PROCEDURE — 82374 ASSAY BLOOD CARBON DIOXIDE: CPT | Performed by: STUDENT IN AN ORGANIZED HEALTH CARE EDUCATION/TRAINING PROGRAM

## 2024-07-27 PROCEDURE — 2500000001 HC RX 250 WO HCPCS SELF ADMINISTERED DRUGS (ALT 637 FOR MEDICARE OP): Performed by: STUDENT IN AN ORGANIZED HEALTH CARE EDUCATION/TRAINING PROGRAM

## 2024-07-27 PROCEDURE — 2500000005 HC RX 250 GENERAL PHARMACY W/O HCPCS: Performed by: STUDENT IN AN ORGANIZED HEALTH CARE EDUCATION/TRAINING PROGRAM

## 2024-07-27 PROCEDURE — 2500000002 HC RX 250 W HCPCS SELF ADMINISTERED DRUGS (ALT 637 FOR MEDICARE OP, ALT 636 FOR OP/ED): Performed by: REGISTERED NURSE

## 2024-07-27 PROCEDURE — 2500000001 HC RX 250 WO HCPCS SELF ADMINISTERED DRUGS (ALT 637 FOR MEDICARE OP)

## 2024-07-27 PROCEDURE — 84100 ASSAY OF PHOSPHORUS: CPT | Performed by: STUDENT IN AN ORGANIZED HEALTH CARE EDUCATION/TRAINING PROGRAM

## 2024-07-27 PROCEDURE — 2500000001 HC RX 250 WO HCPCS SELF ADMINISTERED DRUGS (ALT 637 FOR MEDICARE OP): Performed by: REGISTERED NURSE

## 2024-07-27 PROCEDURE — 83735 ASSAY OF MAGNESIUM: CPT | Performed by: STUDENT IN AN ORGANIZED HEALTH CARE EDUCATION/TRAINING PROGRAM

## 2024-07-27 PROCEDURE — 2060000001 HC INTERMEDIATE ICU ROOM DAILY

## 2024-07-27 RX ADMIN — METOPROLOL SUCCINATE 50 MG: 50 TABLET, EXTENDED RELEASE ORAL at 20:38

## 2024-07-27 RX ADMIN — OLANZAPINE 5 MG: 5 TABLET, ORALLY DISINTEGRATING ORAL at 20:38

## 2024-07-27 RX ADMIN — POLYETHYLENE GLYCOL 3350 17 G: 17 POWDER, FOR SOLUTION ORAL at 09:22

## 2024-07-27 RX ADMIN — POTASSIUM CHLORIDE 20 MEQ: 1.5 POWDER, FOR SOLUTION ORAL at 17:26

## 2024-07-27 RX ADMIN — SENNOSIDES AND DOCUSATE SODIUM 1 TABLET: 50; 8.6 TABLET ORAL at 20:38

## 2024-07-27 RX ADMIN — Medication 3 L/MIN: at 08:00

## 2024-07-27 RX ADMIN — ENOXAPARIN SODIUM 40 MG: 40 INJECTION SUBCUTANEOUS at 09:22

## 2024-07-27 RX ADMIN — SENNOSIDES AND DOCUSATE SODIUM 1 TABLET: 50; 8.6 TABLET ORAL at 09:22

## 2024-07-27 RX ADMIN — ASPIRIN 81 MG 81 MG: 81 TABLET ORAL at 09:22

## 2024-07-27 RX ADMIN — ATORVASTATIN CALCIUM 10 MG: 10 TABLET, FILM COATED ORAL at 20:38

## 2024-07-27 RX ADMIN — ALLOPURINOL 300 MG: 300 TABLET ORAL at 09:22

## 2024-07-27 RX ADMIN — Medication 3 MG: at 17:26

## 2024-07-27 RX ADMIN — POTASSIUM CHLORIDE 20 MEQ: 1.5 POWDER, FOR SOLUTION ORAL at 09:22

## 2024-07-27 RX ADMIN — AMLODIPINE BESYLATE 5 MG: 5 TABLET ORAL at 09:22

## 2024-07-27 RX ADMIN — AMIODARONE HYDROCHLORIDE 200 MG: 200 TABLET ORAL at 09:22

## 2024-07-27 RX ADMIN — METOPROLOL SUCCINATE 50 MG: 50 TABLET, EXTENDED RELEASE ORAL at 09:22

## 2024-07-27 ASSESSMENT — COGNITIVE AND FUNCTIONAL STATUS - GENERAL
DRESSING REGULAR UPPER BODY CLOTHING: A LOT
EATING MEALS: A LOT
HELP NEEDED FOR BATHING: A LOT
DAILY ACTIVITIY SCORE: 12
TOILETING: A LOT
MOVING TO AND FROM BED TO CHAIR: A LITTLE
PERSONAL GROOMING: A LOT
CLIMB 3 TO 5 STEPS WITH RAILING: A LOT
DRESSING REGULAR UPPER BODY CLOTHING: A LOT
MOVING FROM LYING ON BACK TO SITTING ON SIDE OF FLAT BED WITH BEDRAILS: A LITTLE
TOILETING: A LOT
STANDING UP FROM CHAIR USING ARMS: A LITTLE
DAILY ACTIVITIY SCORE: 12
DRESSING REGULAR LOWER BODY CLOTHING: A LOT
MOVING TO AND FROM BED TO CHAIR: A LITTLE
MOBILITY SCORE: 16
DRESSING REGULAR LOWER BODY CLOTHING: A LOT
MOVING FROM LYING ON BACK TO SITTING ON SIDE OF FLAT BED WITH BEDRAILS: A LITTLE
TURNING FROM BACK TO SIDE WHILE IN FLAT BAD: A LITTLE
EATING MEALS: A LOT
TURNING FROM BACK TO SIDE WHILE IN FLAT BAD: A LITTLE
WALKING IN HOSPITAL ROOM: A LOT
MOBILITY SCORE: 16
CLIMB 3 TO 5 STEPS WITH RAILING: A LOT
PERSONAL GROOMING: A LOT
STANDING UP FROM CHAIR USING ARMS: A LITTLE
WALKING IN HOSPITAL ROOM: A LOT
HELP NEEDED FOR BATHING: A LOT

## 2024-07-27 ASSESSMENT — PAIN SCALES - PAIN ASSESSMENT IN ADVANCED DEMENTIA (PAINAD)
FACIALEXPRESSION: SMILING OR INEXPRESSIVE
BREATHING: NORMAL
TOTALSCORE: 0
BODYLANGUAGE: RELAXED
CONSOLABILITY: NO NEED TO CONSOLE

## 2024-07-27 ASSESSMENT — PAIN - FUNCTIONAL ASSESSMENT: PAIN_FUNCTIONAL_ASSESSMENT: PAINAD (PAIN ASSESSMENT IN ADVANCED DEMENTIA SCALE)

## 2024-07-27 ASSESSMENT — PAIN SCALES - WONG BAKER: WONGBAKER_NUMERICALRESPONSE: NO HURT

## 2024-07-27 ASSESSMENT — PAIN SCALES - GENERAL
PAINLEVEL_OUTOF10: 0 - NO PAIN
PAINLEVEL_OUTOF10: 0 - NO PAIN

## 2024-07-27 NOTE — CARE PLAN
The patient's goals for the shift include      The clinical goals for the shift include to improve pt ablitiy to care for self and be free of restraints during this shift      Problem: Knowledge Deficit  Goal: Patient/family/caregiver demonstrates understanding of disease process, treatment plan, medications, and discharge instructions  Outcome: Progressing     Problem: Mechanical Ventilation  Goal: Patient Will Maintain Patent Airway  Outcome: Progressing  Goal: Oral health is maintained or improved  Outcome: Progressing  Goal: ET tube will be managed safely  Outcome: Progressing  Goal: Ability to express needs and understand communication  Outcome: Progressing  Goal: Mobility/activity is maintained at optimum level for patient  Outcome: Progressing     Problem: Pain - Adult  Goal: Verbalizes/displays adequate comfort level or baseline comfort level  Outcome: Progressing     Problem: Safety - Adult  Goal: Free from fall injury  Outcome: Progressing     Problem: Risk for falls  Goal: I will remain free from falls  Outcome: Progressing

## 2024-07-27 NOTE — NURSING NOTE
UPDATE 2323: patient no longer pulling or trying to exit bed, Notifying provider for possible sitter or discontinuation.     Dr. Anne ordered for discontinuation of sitter order, poesy alarm placed on patient and activated. Bed alarm not able to be set with repositioning patient, changing bed position.

## 2024-07-27 NOTE — PROGRESS NOTES
Yan Powell is a 73 y.o. female on day 8 of admission presenting with Cardiac arrest (Multi).  Patient was initially admitted to ICU after she sustained cardiac arrest/ventricular fibrillation in the field, he had return of spontaneous circulation.  Per patient was intubated and then subsequently extubated.  Patient had cardiac cath on July 23, no obstructive lesion.  Ejection fraction is preserved according to the echo.  Patient demonstrates some degree of anoxic encephalopathy.  She was evaluated by neurologist, and was to perform EEG, I do not see results available yet.  Patient is on Seroquel.  She is on metoprolol 25 mg twice a day.  Electrophysiology was consulted and they were planning to place AICD but according to the notes, they were hoping to see some improvement in patient's mental status for proceeding with AICD placement.    Subjective   Patient is more calm now.  She does not require a sitter.  She denies any complaints.  Denies chest pain or shortness of breath.       Objective     Last Recorded Vitals  BP (!) 138/92 (BP Location: Left arm, Patient Position: Lying)   Pulse 67   Temp 36.1 °C (97 °F) (Temporal)   Resp 18   Wt 66.8 kg (147 lb 4.3 oz)   SpO2 94%   Intake/Output last 3 Shifts:    Intake/Output Summary (Last 24 hours) at 7/27/2024 1230  Last data filed at 7/27/2024 0731  Gross per 24 hour   Intake 220 ml   Output 0 ml   Net 220 ml       Admission Weight  Weight: 117 kg (258 lb 6.1 oz) (07/19/24 1251)    Daily Weight  07/27/24 : 66.8 kg (147 lb 4.3 oz)    Image Results  Transthoracic Echo (TTE) [place] [id number]             Phone [phone]    TRANSTHORACIC ECHOCARDIOGRAM REPORT    Patient Name:      YAN POWELL         Reading Physician:    [id number] Juan Byrd MD  Study Date:        7/19/2024             Ordering Provider:    [id number] FLETCHER BAIRD                                                                  LOIS  MRN/PID:           [id number]              Fellow:  Accession#:        [id number]          Nurse:  Date of Birth/Age: 1950 / 73 years Sonographer:          Jennifer Childers RDCS  Gender:            F                     Additional Staff:  Height:            170.00 cm             Admit Date:  Weight:            117.00 kg             Admission Status:     Inpatient -                                                                 Routine  BSA / BMI:         2.25 m2 / 40.48 kg/m2 Department Location:  [facility]  Blood Pressure: 125 /72 mmHg    Study Type:    TRANSTHORACIC ECHO (TTE) COMPLETE  Diagnosis/ICD: Cardiac arrest, cause unspecified-I46.9  Indication:    cardiac arrest  CPT Codes:     Echo Complete w Full [id number]    Patient History:  Pertinent History: Cardiac arrest vent hypotensive.    Study Detail: The following Echo studies were performed: 2D, M-Mode, Doppler and                color flow. Technically challenging study due to prominent lung                artifact, body habitus and patient lying in supine position.                Definity used as a contrast agent for endocardial border                definition. Total contrast used for this procedure was 2 mL via IV                push.       PHYSICIAN INTERPRETATION:  Left Ventricle: Left ventricular ejection fraction is mildly decreased, by visual estimate at 50%. There is global hypokinesis of the left ventricle with minor regional variations. The left ventricular cavity size is normal. Spectral Doppler shows a normal pattern of left ventricular diastolic filling.  Left Atrium: The left atrium is normal in size.  Right Ventricle: The right ventricle is normal in size. There is normal right ventricular global systolic function.  Right Atrium: The right atrium is normal in size.  Aortic Valve: The aortic valve  appears structurally normal. The aortic valve dimensionless index is 0.42. There is mild aortic valve regurgitation. The peak instantaneous gradient of the aortic valve is 15.7 mmHg. The mean gradient of the aortic valve is 9.0 mmHg.  Mitral Valve: The mitral valve is normal in structure. There is no evidence of mitral valve regurgitation.  Tricuspid Valve: The tricuspid valve is structurally normal. There is trace tricuspid regurgitation. The Doppler estimated RVSP is mildly elevated at 33.0 mmHg.  Pulmonic Valve: The pulmonic valve is structurally normal. There is physiologic pulmonic valve regurgitation.  Pericardium: There is no pericardial effusion noted.  Aorta: The aortic root is normal.  Systemic Veins: The inferior vena cava appears dilated. There is less than 50% IVC collapse with inspiration.       CONCLUSIONS:   1. Poorly visualized anatomical structures due to suboptimal image quality.   2. Left ventricular ejection fraction is mildly decreased, by visual estimate at 50%.   3. There is global hypokinesis of the left ventricle with minor regional variations.   4. There is normal right ventricular global systolic function.   5. No evidence of mitral valve regurgitation.   6. Mildly elevated RVSP.   7. Trace tricuspid regurgitation is visualized.   8. Mild aortic valve regurgitation.    QUANTITATIVE DATA SUMMARY:  2D MEASUREMENTS:                            Normal Ranges:  LAs:           2.80 cm    (2.7-4.0cm)  IVSd:          1.04 cm    (0.6-1.1cm)  LVPWd:         0.82 cm    (0.6-1.1cm)  LVIDd:         6.22 cm    (3.9-5.9cm)  LV Mass Index: 106.0 g/m2    LV SYSTOLIC FUNCTION BY 2D PLANIMETRY (MOD):                       Normal Ranges:  EF-A4C View:    50 % (>=55%)  EF-A2C View:    46 %  EF-Biplane:     50 %  EF-Visual:      50 %  LV EF Reported: 50 %    LV DIASTOLIC FUNCTION:                      Normal Ranges:  MV Peak E: 0.58 m/s (0.7-1.2 m/s)  MV Peak A: 0.82 m/s (0.42-0.7 m/s)  E/A Ratio: 0.71      (1.0-2.2)    MITRAL VALVE:                  Normal Ranges:  MV DT: 354 msec (150-240msec)    AORTIC VALVE:                                     Normal Ranges:  AoV Vmax:                1.98 m/s  (<=1.7m/s)  AoV Peak PG:             15.7 mmHg (<20mmHg)  AoV Mean P.0 mmHg  (1.7-11.5mmHg)  LVOT Max Dao:            0.78 m/s  (<=1.1m/s)  AoV VTI:                 39.70 cm  (18-25cm)  LVOT VTI:                16.50 cm  LVOT Diameter:           2.00 cm   (1.8-2.4cm)  AoV Area, VTI:           1.31 cm2  (2.5-5.5cm2)  AoV Area,Vmax:           1.23 cm2  (2.5-4.5cm2)  AoV Dimensionless Index: 0.42    TRICUSPID VALVE/RVSP:                              Normal Ranges:  Peak TR Velocity: 2.12 m/s  RV Syst Pressure: 33.0 mmHg (< 30mmHg)  IVC Diam:         2.27 cm    PULMONIC VALVE:                       Normal Ranges:  PV Max Dao: 0.7 m/s  (0.6-0.9m/s)  PV Max P.1 mmHg       [id number] Juan Byrd MD  Electronically signed on 2024 at 3:34:25 PM       ** Final **      Physical Exam  Location: Stepdown, bed 7A.  Patient is alert, confused.  Oriented to name only.  She knows she is in the hospital but has no idea why she is here and how long she has been here.    Overall, she is comfortable, in no apparent distress.  Does not look toxic.  No cough according to the sitter  Face is symmetrical.  Moves all extremities.  Lungs are clear to auscultation bilaterally.  Heart: Irregular tachycardic S1-S2.  Abdomen is soft.  Bowel sounds positive.  Extremities: No peripheral edema, cords, cyanosis.    Relevant Results             EKG strips reviewed: Sinus rhythm with occasional PVCs and PACs.  Assessment/Plan        This patient has a central line   Reason for the central line remaining today? Line unnecessary, will be removed today            Principal Problem:    Cardiac arrest (Multi)  Active Problems:    Cardiac arrest with ventricular fibrillation (Multi)    Status postcardiac arrest with ventricular fibrillation.   Patient has preserved ejection fraction and normal coronaries.  She is currently on metoprolol 25 mg twice a day.  I discussed patient case with electrophysiology nurse practitioner.  They will review the case and decide about amiodarone.  AICD placement.  EP.  They are waiting for improvement of anoxic encephalopathy.  Tachycardia.  I reviewed monitor strips.  I am not sure that patient is in atrial fibrillation it looks like she may have underlying sinus rhythm with frequent PACs and PVCs some episodes of SVT.  Discussed with cardiology.  They recommended to give patient extra dose of oral metoprolol. They will decide about possible loading with Amiodarone.  Anoxic encephalopathy.  Followed by neurologist.  Was supposed to have an EEG done.  Leukocytosis.  Patient was treated with antibiotics in the ICU.  She denies any cough, sputum production, any signs of infection.  Will continue monitoring off antibiotics.  DVT prophylaxis Lovenox  Acute respiratory failure with hypoxia and hypercapnia status post intubation, was successfully extubated.  Currently stable on nasal cannula.  Continue weaning as tolerated.  Hypokalemia.  Will replace.  Our goal is potassium level 4 or above.  Will give 1 g of magnesium sulfate IV to keep magnesium level 2 or above.       7//26/24:  Discussed with electrophysiology yesterday: The increased dose of metoprolol and started amiodarone 200 mg daily.  They still not sure about AICD placement during this admission due to significant anoxic encephalopathy.  They are planning to discuss that issue with patient's family.  Paroxysmal atrial fibrillation.  Patient is back to sinus rhythm.  Cardiology will decide about anticoagulation.  Hypokalemia, improved.  Continue maintenance  Leukocytosis.  Clinically, no obvious source of infection.  She does not have any cough.  She does not look septic.  She received Zosyn and full dose for 5 days in the ICU and has been discontinued.  Monitor off  antibiotics.  May be stress related   Seen by psychiatry yesterday, appreciate recommendations and changes.    7/27/24:  Still confused, no significant improvement in orientation.  More quiet.  Remains in sinus rhythm with occasional PVCs and PACs.  Currently on metoprolol and 200 mg of amiodarone daily.  Electrophysiology to decide about AICD.    Amanda Goins MD

## 2024-07-27 NOTE — PROGRESS NOTES
07/27/24 1309   Discharge Planning   Expected Discharge Disposition Other  (UNSURE at this time)     Spoke with  See Powell.  He states they are having a family meeting tomorrow to discuss plan of care for wife/mother.   Also, waiting to speak with Electropsyiology regarding possible AICD placement.

## 2024-07-28 LAB
ANION GAP SERPL CALC-SCNC: 13 MMOL/L
BASOPHILS # BLD AUTO: 0.08 X10*3/UL (ref 0–0.1)
BASOPHILS NFR BLD AUTO: 0.6 %
BUN SERPL-MCNC: 20 MG/DL (ref 8–25)
CALCIUM SERPL-MCNC: 9.2 MG/DL (ref 8.5–10.4)
CHLORIDE SERPL-SCNC: 105 MMOL/L (ref 97–107)
CO2 SERPL-SCNC: 22 MMOL/L (ref 24–31)
CREAT SERPL-MCNC: 1.3 MG/DL (ref 0.4–1.6)
EGFRCR SERPLBLD CKD-EPI 2021: 44 ML/MIN/1.73M*2
EOSINOPHIL # BLD AUTO: 0.2 X10*3/UL (ref 0–0.4)
EOSINOPHIL NFR BLD AUTO: 1.5 %
ERYTHROCYTE [DISTWIDTH] IN BLOOD BY AUTOMATED COUNT: 16.2 % (ref 11.5–14.5)
GLUCOSE SERPL-MCNC: 93 MG/DL (ref 65–99)
HCT VFR BLD AUTO: 42.9 % (ref 36–46)
HGB BLD-MCNC: 12.6 G/DL (ref 12–16)
IMM GRANULOCYTES # BLD AUTO: 0.16 X10*3/UL (ref 0–0.5)
IMM GRANULOCYTES NFR BLD AUTO: 1.2 % (ref 0–0.9)
LYMPHOCYTES # BLD AUTO: 1.88 X10*3/UL (ref 0.8–3)
LYMPHOCYTES NFR BLD AUTO: 13.8 %
MAGNESIUM SERPL-MCNC: 2 MG/DL (ref 1.6–3.1)
MCH RBC QN AUTO: 27.1 PG (ref 26–34)
MCHC RBC AUTO-ENTMCNC: 29.4 G/DL (ref 32–36)
MCV RBC AUTO: 92 FL (ref 80–100)
MONOCYTES # BLD AUTO: 1.09 X10*3/UL (ref 0.05–0.8)
MONOCYTES NFR BLD AUTO: 8 %
NEUTROPHILS # BLD AUTO: 10.24 X10*3/UL (ref 1.6–5.5)
NEUTROPHILS NFR BLD AUTO: 74.9 %
NRBC BLD-RTO: 0 /100 WBCS (ref 0–0)
PHOSPHATE SERPL-MCNC: 2.7 MG/DL (ref 2.5–4.5)
PLATELET # BLD AUTO: 203 X10*3/UL (ref 150–450)
POTASSIUM SERPL-SCNC: 4.6 MMOL/L (ref 3.4–5.1)
RBC # BLD AUTO: 4.65 X10*6/UL (ref 4–5.2)
SODIUM SERPL-SCNC: 140 MMOL/L (ref 133–145)
WBC # BLD AUTO: 13.7 X10*3/UL (ref 4.4–11.3)

## 2024-07-28 PROCEDURE — 2500000001 HC RX 250 WO HCPCS SELF ADMINISTERED DRUGS (ALT 637 FOR MEDICARE OP): Performed by: STUDENT IN AN ORGANIZED HEALTH CARE EDUCATION/TRAINING PROGRAM

## 2024-07-28 PROCEDURE — 36415 COLL VENOUS BLD VENIPUNCTURE: CPT | Performed by: STUDENT IN AN ORGANIZED HEALTH CARE EDUCATION/TRAINING PROGRAM

## 2024-07-28 PROCEDURE — 2500000002 HC RX 250 W HCPCS SELF ADMINISTERED DRUGS (ALT 637 FOR MEDICARE OP, ALT 636 FOR OP/ED)

## 2024-07-28 PROCEDURE — 2500000001 HC RX 250 WO HCPCS SELF ADMINISTERED DRUGS (ALT 637 FOR MEDICARE OP)

## 2024-07-28 PROCEDURE — 2500000005 HC RX 250 GENERAL PHARMACY W/O HCPCS: Performed by: STUDENT IN AN ORGANIZED HEALTH CARE EDUCATION/TRAINING PROGRAM

## 2024-07-28 PROCEDURE — 2500000001 HC RX 250 WO HCPCS SELF ADMINISTERED DRUGS (ALT 637 FOR MEDICARE OP): Performed by: REGISTERED NURSE

## 2024-07-28 PROCEDURE — 80048 BASIC METABOLIC PNL TOTAL CA: CPT | Performed by: STUDENT IN AN ORGANIZED HEALTH CARE EDUCATION/TRAINING PROGRAM

## 2024-07-28 PROCEDURE — 2500000001 HC RX 250 WO HCPCS SELF ADMINISTERED DRUGS (ALT 637 FOR MEDICARE OP): Performed by: INTERNAL MEDICINE

## 2024-07-28 PROCEDURE — 2500000002 HC RX 250 W HCPCS SELF ADMINISTERED DRUGS (ALT 637 FOR MEDICARE OP, ALT 636 FOR OP/ED): Performed by: REGISTERED NURSE

## 2024-07-28 PROCEDURE — 83735 ASSAY OF MAGNESIUM: CPT | Performed by: STUDENT IN AN ORGANIZED HEALTH CARE EDUCATION/TRAINING PROGRAM

## 2024-07-28 PROCEDURE — 84100 ASSAY OF PHOSPHORUS: CPT | Performed by: STUDENT IN AN ORGANIZED HEALTH CARE EDUCATION/TRAINING PROGRAM

## 2024-07-28 PROCEDURE — 2500000004 HC RX 250 GENERAL PHARMACY W/ HCPCS (ALT 636 FOR OP/ED): Performed by: STUDENT IN AN ORGANIZED HEALTH CARE EDUCATION/TRAINING PROGRAM

## 2024-07-28 PROCEDURE — 2060000001 HC INTERMEDIATE ICU ROOM DAILY

## 2024-07-28 PROCEDURE — 85025 COMPLETE CBC W/AUTO DIFF WBC: CPT | Performed by: STUDENT IN AN ORGANIZED HEALTH CARE EDUCATION/TRAINING PROGRAM

## 2024-07-28 RX ADMIN — POTASSIUM CHLORIDE 20 MEQ: 1.5 POWDER, FOR SOLUTION ORAL at 09:17

## 2024-07-28 RX ADMIN — Medication 3 MG: at 17:17

## 2024-07-28 RX ADMIN — POLYETHYLENE GLYCOL 3350 17 G: 17 POWDER, FOR SOLUTION ORAL at 09:17

## 2024-07-28 RX ADMIN — Medication 2 L/MIN: at 08:00

## 2024-07-28 RX ADMIN — OLANZAPINE 5 MG: 5 TABLET, ORALLY DISINTEGRATING ORAL at 21:32

## 2024-07-28 RX ADMIN — ASPIRIN 81 MG 81 MG: 81 TABLET ORAL at 09:16

## 2024-07-28 RX ADMIN — AMLODIPINE BESYLATE 5 MG: 5 TABLET ORAL at 09:16

## 2024-07-28 RX ADMIN — ALLOPURINOL 300 MG: 300 TABLET ORAL at 09:00

## 2024-07-28 RX ADMIN — SENNOSIDES AND DOCUSATE SODIUM 1 TABLET: 50; 8.6 TABLET ORAL at 21:32

## 2024-07-28 RX ADMIN — ENOXAPARIN SODIUM 40 MG: 40 INJECTION SUBCUTANEOUS at 09:17

## 2024-07-28 RX ADMIN — METOPROLOL SUCCINATE 50 MG: 50 TABLET, EXTENDED RELEASE ORAL at 21:32

## 2024-07-28 RX ADMIN — METOPROLOL SUCCINATE 50 MG: 50 TABLET, EXTENDED RELEASE ORAL at 09:16

## 2024-07-28 RX ADMIN — ATORVASTATIN CALCIUM 10 MG: 10 TABLET, FILM COATED ORAL at 21:32

## 2024-07-28 RX ADMIN — AMIODARONE HYDROCHLORIDE 200 MG: 200 TABLET ORAL at 09:16

## 2024-07-28 RX ADMIN — SENNOSIDES AND DOCUSATE SODIUM 1 TABLET: 50; 8.6 TABLET ORAL at 09:16

## 2024-07-28 ASSESSMENT — COGNITIVE AND FUNCTIONAL STATUS - GENERAL
PERSONAL GROOMING: A LOT
MOVING FROM LYING ON BACK TO SITTING ON SIDE OF FLAT BED WITH BEDRAILS: A LITTLE
HELP NEEDED FOR BATHING: A LOT
EATING MEALS: A LOT
PERSONAL GROOMING: A LOT
DRESSING REGULAR UPPER BODY CLOTHING: A LOT
CLIMB 3 TO 5 STEPS WITH RAILING: A LOT
DRESSING REGULAR LOWER BODY CLOTHING: A LOT
EATING MEALS: A LOT
MOBILITY SCORE: 16
TURNING FROM BACK TO SIDE WHILE IN FLAT BAD: A LITTLE
DRESSING REGULAR LOWER BODY CLOTHING: A LOT
DAILY ACTIVITIY SCORE: 12
MOVING TO AND FROM BED TO CHAIR: A LITTLE
TURNING FROM BACK TO SIDE WHILE IN FLAT BAD: A LITTLE
TOILETING: A LOT
WALKING IN HOSPITAL ROOM: A LOT
CLIMB 3 TO 5 STEPS WITH RAILING: A LOT
MOVING FROM LYING ON BACK TO SITTING ON SIDE OF FLAT BED WITH BEDRAILS: A LITTLE
DRESSING REGULAR UPPER BODY CLOTHING: A LOT
MOBILITY SCORE: 16
STANDING UP FROM CHAIR USING ARMS: A LITTLE
TOILETING: A LOT
WALKING IN HOSPITAL ROOM: A LOT
DAILY ACTIVITIY SCORE: 12
HELP NEEDED FOR BATHING: A LOT
MOVING TO AND FROM BED TO CHAIR: A LITTLE
STANDING UP FROM CHAIR USING ARMS: A LITTLE

## 2024-07-28 ASSESSMENT — PAIN SCALES - GENERAL
PAINLEVEL_OUTOF10: 0 - NO PAIN
PAINLEVEL_OUTOF10: 0 - NO PAIN

## 2024-07-28 ASSESSMENT — PAIN - FUNCTIONAL ASSESSMENT: PAIN_FUNCTIONAL_ASSESSMENT: 0-10

## 2024-07-28 NOTE — NURSING NOTE
Patient is lying in bed watching tv.  Patient is currently on RA.  No shortness of breath noted.  Patient denied pain/discomfort.  Patient's bed is lowest, locked position.  Patient's call light is within reach.

## 2024-07-28 NOTE — CARE PLAN
The patient's goals for the shift include      The clinical goals for the shift include patient safe from falls

## 2024-07-28 NOTE — NURSING NOTE
Assumed  care of this patient.  Patient is lying in bed resting watching television.  Patient is alert ad oriented to self.  Patient is currently on RA.  No shortness of breath noted.  Patient denied pain/discomfort.  Patient's bed is in the lowest, locked position.  Patient's call light is within reach.   I will SWITCH the dose or number of times a day I take the medications listed below when I get home from the hospital:  None

## 2024-07-28 NOTE — PROGRESS NOTES
Yan Powell is a 73 y.o. female on day 9 of admission presenting with Cardiac arrest (Multi).  Patient was initially admitted to ICU after she sustained cardiac arrest/ventricular fibrillation in the field, he had return of spontaneous circulation.  Per patient was intubated and then subsequently extubated.  Patient had cardiac cath on July 23, no obstructive lesion.  Ejection fraction is preserved according to the echo.  Patient demonstrates some degree of anoxic encephalopathy.  She was evaluated by neurologist, and was to perform EEG, I do not see results available yet.  Patient is on Seroquel.  She is on metoprolol 25 mg twice a day.  Electrophysiology was consulted and they were planning to place AICD but according to the notes, they were hoping to see some improvement in patient's mental status for proceeding with AICD placement.    Subjective   Patient is more calm now.  She does not require a sitter.  She denies any complaints.  Denies chest pain or shortness of breath.       Objective     Last Recorded Vitals  BP (!) 140/96 (BP Location: Left arm, Patient Position: Lying)   Pulse 82   Temp 36.1 °C (97 °F) (Temporal)   Resp 19   Wt 66.8 kg (147 lb 4.3 oz)   SpO2 99%   Intake/Output last 3 Shifts:    Intake/Output Summary (Last 24 hours) at 7/28/2024 1345  Last data filed at 7/28/2024 0538  Gross per 24 hour   Intake 100 ml   Output 1000 ml   Net -900 ml       Admission Weight  Weight: 117 kg (258 lb 6.1 oz) (07/19/24 1251)    Daily Weight  07/28/24 : 66.8 kg (147 lb 4.3 oz)    Image Results  Transthoracic Echo ([place]E) [place] [address]             Phone [phone]    TRANSTHORACIC ECHOCARDIOGRAM REPORT    Patient Name:      YAN POWELL         Reading Physician:    [id number] Juan Byrd MD  Study Date:        7/19/2024             Ordering Provider:    [id number] BAIRD                                                                  LOIS  MRN/PID:           [id number]              Fellow:  Accession#:        [id number]          Nurse:  Date of Birth/Age: 1950 / 73 years Sonographer:          Jennifer Childers RDCS  Gender:            F                     Additional Staff:  Height:            170.00 cm             Admit Date:  Weight:            117.00 kg             Admission Status:     Inpatient -                                                                 Routine  BSA / BMI:         2.25 m2 / 40.48 kg/m2 Department Location:  [facility]  Blood Pressure: 125 /72 mmHg    Study Type:    TRANSTHORACIC ECHO (TTE) COMPLETE  Diagnosis/ICD: Cardiac arrest, cause unspecified-I46.9  Indication:    cardiac arrest  CPT Codes:     Echo Complete w Full Doppler-[id number]    Patient History:  Pertinent History: Cardiac arrest vent hypotensive.    Study Detail: The following Echo studies were performed: 2D, M-Mode, Doppler and                color flow. Technically challenging study due to prominent lung                artifact, body habitus and patient lying in supine position.                Definity used as a contrast agent for endocardial border                definition. Total contrast used for this procedure was 2 mL via IV                push.       PHYSICIAN INTERPRETATION:  Left Ventricle: Left ventricular ejection fraction is mildly decreased, by visual estimate at 50%. There is global hypokinesis of the left ventricle with minor regional variations. The left ventricular cavity size is normal. Spectral Doppler shows a normal pattern of left ventricular diastolic filling.  Left Atrium: The left atrium is normal in size.  Right Ventricle: The right ventricle is normal in size. There is normal right ventricular global systolic function.  Right Atrium: The right atrium is normal in size.  Aortic Valve: The aortic valve  appears structurally normal. The aortic valve dimensionless index is 0.42. There is mild aortic valve regurgitation. The peak instantaneous gradient of the aortic valve is 15.7 mmHg. The mean gradient of the aortic valve is 9.0 mmHg.  Mitral Valve: The mitral valve is normal in structure. There is no evidence of mitral valve regurgitation.  Tricuspid Valve: The tricuspid valve is structurally normal. There is trace tricuspid regurgitation. The Doppler estimated RVSP is mildly elevated at 33.0 mmHg.  Pulmonic Valve: The pulmonic valve is structurally normal. There is physiologic pulmonic valve regurgitation.  Pericardium: There is no pericardial effusion noted.  Aorta: The aortic root is normal.  Systemic Veins: The inferior vena cava appears dilated. There is less than 50% IVC collapse with inspiration.       CONCLUSIONS:   1. Poorly visualized anatomical structures due to suboptimal image quality.   2. Left ventricular ejection fraction is mildly decreased, by visual estimate at 50%.   3. There is global hypokinesis of the left ventricle with minor regional variations.   4. There is normal right ventricular global systolic function.   5. No evidence of mitral valve regurgitation.   6. Mildly elevated RVSP.   7. Trace tricuspid regurgitation is visualized.   8. Mild aortic valve regurgitation.    QUANTITATIVE DATA SUMMARY:  2D MEASUREMENTS:                            Normal Ranges:  LAs:           2.80 cm    (2.7-4.0cm)  IVSd:          1.04 cm    (0.6-1.1cm)  LVPWd:         0.82 cm    (0.6-1.1cm)  LVIDd:         6.22 cm    (3.9-5.9cm)  LV Mass Index: 106.0 g/m2    LV SYSTOLIC FUNCTION BY 2D PLANIMETRY (MOD):                       Normal Ranges:  EF-A4C View:    50 % (>=55%)  EF-A2C View:    46 %  EF-Biplane:     50 %  EF-Visual:      50 %  LV EF Reported: 50 %    LV DIASTOLIC FUNCTION:                      Normal Ranges:  MV Peak E: 0.58 m/s (0.7-1.2 m/s)  MV Peak A: 0.82 m/s (0.42-0.7 m/s)  E/A Ratio: 0.71      (1.0-2.2)    MITRAL VALVE:                  Normal Ranges:  MV DT: 354 msec (150-240msec)    AORTIC VALVE:                                     Normal Ranges:  AoV Vmax:                1.98 m/s  (<=1.7m/s)  AoV Peak PG:             15.7 mmHg (<20mmHg)  AoV Mean P.0 mmHg  (1.7-11.5mmHg)  LVOT Max Dao:            0.78 m/s  (<=1.1m/s)  AoV VTI:                 39.70 cm  (18-25cm)  LVOT VTI:                16.50 cm  LVOT Diameter:           2.00 cm   (1.8-2.4cm)  AoV Area, VTI:           1.31 cm2  (2.5-5.5cm2)  AoV Area,Vmax:           1.23 cm2  (2.5-4.5cm2)  AoV Dimensionless Index: 0.42    TRICUSPID VALVE/RVSP:                              Normal Ranges:  Peak TR Velocity: 2.12 m/s  RV Syst Pressure: 33.0 mmHg (< 30mmHg)  IVC Diam:         2.27 cm    PULMONIC VALVE:                       Normal Ranges:  PV Max Dao: 0.7 m/s  (0.6-0.9m/s)  PV Max P.1 mmHg       [id number] Juan Byrd MD  Electronically signed on 2024 at 3:34:25 PM       ** Final **      Physical Exam  Location: Stepdown, bed 7A.  Patient is alert, confused.  Oriented to name only.  She knows she is in the hospital but has no idea why she is here and how long she has been here.    Overall, she is comfortable, in no apparent distress.  Does not look toxic.  No cough according to the sitter  Face is symmetrical.  Moves all extremities.  Lungs are clear to auscultation bilaterally.  Heart: Irregular tachycardic S1-S2.  Abdomen is soft.  Bowel sounds positive.  Extremities: No peripheral edema, cords, cyanosis.    Relevant Results             EKG strips reviewed: Sinus rhythm with occasional PVCs and PACs.  Assessment/Plan        This patient has a central line   Reason for the central line remaining today? Line unnecessary, will be removed today            Principal Problem:    Cardiac arrest (Multi)  Active Problems:    Cardiac arrest with ventricular fibrillation (Multi)    Status postcardiac arrest with ventricular fibrillation.   Patient has preserved ejection fraction and normal coronaries.  She is currently on metoprolol 25 mg twice a day.  I discussed patient case with electrophysiology nurse practitioner.  They will review the case and decide about amiodarone.  AICD placement.  EP.  They are waiting for improvement of anoxic encephalopathy.  Tachycardia.  I reviewed monitor strips.  I am not sure that patient is in atrial fibrillation it looks like she may have underlying sinus rhythm with frequent PACs and PVCs some episodes of SVT.  Discussed with cardiology.  They recommended to give patient extra dose of oral metoprolol. They will decide about possible loading with Amiodarone.  Anoxic encephalopathy.  Followed by neurologist.  Was supposed to have an EEG done.  Leukocytosis.  Patient was treated with antibiotics in the ICU.  She denies any cough, sputum production, any signs of infection.  Will continue monitoring off antibiotics.  DVT prophylaxis Lovenox  Acute respiratory failure with hypoxia and hypercapnia status post intubation, was successfully extubated.  Currently stable on nasal cannula.  Continue weaning as tolerated.  Hypokalemia.  Will replace.  Our goal is potassium level 4 or above.  Will give 1 g of magnesium sulfate IV to keep magnesium level 2 or above.       7//26/24:  Discussed with electrophysiology yesterday: The increased dose of metoprolol and started amiodarone 200 mg daily.  They still not sure about AICD placement during this admission due to significant anoxic encephalopathy.  They are planning to discuss that issue with patient's family.  Paroxysmal atrial fibrillation.  Patient is back to sinus rhythm.  Cardiology will decide about anticoagulation.  Hypokalemia, improved.  Continue maintenance  Leukocytosis.  Clinically, no obvious source of infection.  She does not have any cough.  She does not look septic.  She received Zosyn and full dose for 5 days in the ICU and has been discontinued.  Monitor off  antibiotics.  May be stress related   Seen by psychiatry yesterday, appreciate recommendations and changes.    7/27/24:  Still confused, no significant improvement in orientation.  More quiet.  Remains in sinus rhythm with occasional PVCs and PACs.  Currently on metoprolol and 200 mg of amiodarone daily.  Electrophysiology to decide about AICD.    7/28/24:  Patient remained stable.  In sinus rhythm on monitor.  No overnight events.  No improvement in patient's mentation.  She is still oriented only to name, sometimes she knows that she is in the hospital.  Waiting for family to decide on disposition: Rehab versus home.  Family is waiting for discussion with electrophysiology about possible AICD placement.  Tolerates current therapy well.    Amanda Goins MD

## 2024-07-29 LAB
ANION GAP SERPL CALC-SCNC: 17 MMOL/L
BASOPHILS # BLD AUTO: 0.07 X10*3/UL (ref 0–0.1)
BASOPHILS NFR BLD AUTO: 0.5 %
BUN SERPL-MCNC: 20 MG/DL (ref 8–25)
CALCIUM SERPL-MCNC: 9.2 MG/DL (ref 8.5–10.4)
CHLORIDE SERPL-SCNC: 103 MMOL/L (ref 97–107)
CO2 SERPL-SCNC: 20 MMOL/L (ref 24–31)
CREAT SERPL-MCNC: 1.2 MG/DL (ref 0.4–1.6)
EGFRCR SERPLBLD CKD-EPI 2021: 48 ML/MIN/1.73M*2
EOSINOPHIL # BLD AUTO: 0.19 X10*3/UL (ref 0–0.4)
EOSINOPHIL NFR BLD AUTO: 1.5 %
ERYTHROCYTE [DISTWIDTH] IN BLOOD BY AUTOMATED COUNT: 16.1 % (ref 11.5–14.5)
GLUCOSE SERPL-MCNC: 118 MG/DL (ref 65–99)
HCT VFR BLD AUTO: 40.3 % (ref 36–46)
HGB BLD-MCNC: 12.7 G/DL (ref 12–16)
IMM GRANULOCYTES # BLD AUTO: 0.14 X10*3/UL (ref 0–0.5)
IMM GRANULOCYTES NFR BLD AUTO: 1.1 % (ref 0–0.9)
LYMPHOCYTES # BLD AUTO: 1.58 X10*3/UL (ref 0.8–3)
LYMPHOCYTES NFR BLD AUTO: 12.2 %
MAGNESIUM SERPL-MCNC: 2 MG/DL (ref 1.6–3.1)
MCH RBC QN AUTO: 27.4 PG (ref 26–34)
MCHC RBC AUTO-ENTMCNC: 31.5 G/DL (ref 32–36)
MCV RBC AUTO: 87 FL (ref 80–100)
MONOCYTES # BLD AUTO: 1.07 X10*3/UL (ref 0.05–0.8)
MONOCYTES NFR BLD AUTO: 8.3 %
NEUTROPHILS # BLD AUTO: 9.89 X10*3/UL (ref 1.6–5.5)
NEUTROPHILS NFR BLD AUTO: 76.4 %
NRBC BLD-RTO: 0 /100 WBCS (ref 0–0)
PHOSPHATE SERPL-MCNC: 3.3 MG/DL (ref 2.5–4.5)
PLATELET # BLD AUTO: 220 X10*3/UL (ref 150–450)
POTASSIUM SERPL-SCNC: 4.4 MMOL/L (ref 3.4–5.1)
RBC # BLD AUTO: 4.64 X10*6/UL (ref 4–5.2)
SODIUM SERPL-SCNC: 140 MMOL/L (ref 133–145)
WBC # BLD AUTO: 12.9 X10*3/UL (ref 4.4–11.3)

## 2024-07-29 PROCEDURE — 84100 ASSAY OF PHOSPHORUS: CPT | Performed by: STUDENT IN AN ORGANIZED HEALTH CARE EDUCATION/TRAINING PROGRAM

## 2024-07-29 PROCEDURE — 97110 THERAPEUTIC EXERCISES: CPT | Mod: GP,CQ

## 2024-07-29 PROCEDURE — 2500000001 HC RX 250 WO HCPCS SELF ADMINISTERED DRUGS (ALT 637 FOR MEDICARE OP): Performed by: STUDENT IN AN ORGANIZED HEALTH CARE EDUCATION/TRAINING PROGRAM

## 2024-07-29 PROCEDURE — 2500000002 HC RX 250 W HCPCS SELF ADMINISTERED DRUGS (ALT 637 FOR MEDICARE OP, ALT 636 FOR OP/ED)

## 2024-07-29 PROCEDURE — 97116 GAIT TRAINING THERAPY: CPT | Mod: GP,CQ

## 2024-07-29 PROCEDURE — 2500000004 HC RX 250 GENERAL PHARMACY W/ HCPCS (ALT 636 FOR OP/ED): Performed by: STUDENT IN AN ORGANIZED HEALTH CARE EDUCATION/TRAINING PROGRAM

## 2024-07-29 PROCEDURE — 83735 ASSAY OF MAGNESIUM: CPT | Performed by: STUDENT IN AN ORGANIZED HEALTH CARE EDUCATION/TRAINING PROGRAM

## 2024-07-29 PROCEDURE — 2500000001 HC RX 250 WO HCPCS SELF ADMINISTERED DRUGS (ALT 637 FOR MEDICARE OP): Performed by: INTERNAL MEDICINE

## 2024-07-29 PROCEDURE — 80048 BASIC METABOLIC PNL TOTAL CA: CPT | Performed by: STUDENT IN AN ORGANIZED HEALTH CARE EDUCATION/TRAINING PROGRAM

## 2024-07-29 PROCEDURE — 36415 COLL VENOUS BLD VENIPUNCTURE: CPT | Performed by: STUDENT IN AN ORGANIZED HEALTH CARE EDUCATION/TRAINING PROGRAM

## 2024-07-29 PROCEDURE — 85025 COMPLETE CBC W/AUTO DIFF WBC: CPT | Performed by: STUDENT IN AN ORGANIZED HEALTH CARE EDUCATION/TRAINING PROGRAM

## 2024-07-29 PROCEDURE — 2500000001 HC RX 250 WO HCPCS SELF ADMINISTERED DRUGS (ALT 637 FOR MEDICARE OP): Performed by: REGISTERED NURSE

## 2024-07-29 PROCEDURE — 2500000002 HC RX 250 W HCPCS SELF ADMINISTERED DRUGS (ALT 637 FOR MEDICARE OP, ALT 636 FOR OP/ED): Performed by: REGISTERED NURSE

## 2024-07-29 PROCEDURE — 99231 SBSQ HOSP IP/OBS SF/LOW 25: CPT

## 2024-07-29 PROCEDURE — 2060000001 HC INTERMEDIATE ICU ROOM DAILY

## 2024-07-29 PROCEDURE — 97110 THERAPEUTIC EXERCISES: CPT | Mod: GO,CO

## 2024-07-29 PROCEDURE — 97535 SELF CARE MNGMENT TRAINING: CPT | Mod: GO,CO

## 2024-07-29 PROCEDURE — 2500000001 HC RX 250 WO HCPCS SELF ADMINISTERED DRUGS (ALT 637 FOR MEDICARE OP)

## 2024-07-29 RX ORDER — OLANZAPINE 5 MG/1
2.5 TABLET, ORALLY DISINTEGRATING ORAL NIGHTLY
Status: DISCONTINUED | OUTPATIENT
Start: 2024-07-29 | End: 2024-08-02 | Stop reason: HOSPADM

## 2024-07-29 RX ADMIN — ALLOPURINOL 300 MG: 300 TABLET ORAL at 08:35

## 2024-07-29 RX ADMIN — POTASSIUM CHLORIDE 20 MEQ: 1.5 POWDER, FOR SOLUTION ORAL at 08:34

## 2024-07-29 RX ADMIN — ATORVASTATIN CALCIUM 10 MG: 10 TABLET, FILM COATED ORAL at 21:10

## 2024-07-29 RX ADMIN — Medication 3 MG: at 21:10

## 2024-07-29 RX ADMIN — OLANZAPINE 2.5 MG: 5 TABLET, ORALLY DISINTEGRATING ORAL at 21:11

## 2024-07-29 RX ADMIN — AMIODARONE HYDROCHLORIDE 200 MG: 200 TABLET ORAL at 08:35

## 2024-07-29 RX ADMIN — SENNOSIDES AND DOCUSATE SODIUM 1 TABLET: 50; 8.6 TABLET ORAL at 08:35

## 2024-07-29 RX ADMIN — METOPROLOL SUCCINATE 50 MG: 50 TABLET, EXTENDED RELEASE ORAL at 08:34

## 2024-07-29 RX ADMIN — ENOXAPARIN SODIUM 40 MG: 40 INJECTION SUBCUTANEOUS at 08:43

## 2024-07-29 RX ADMIN — POLYETHYLENE GLYCOL 3350 17 G: 17 POWDER, FOR SOLUTION ORAL at 08:34

## 2024-07-29 RX ADMIN — METOPROLOL SUCCINATE 75 MG: 50 TABLET, EXTENDED RELEASE ORAL at 21:10

## 2024-07-29 RX ADMIN — AMLODIPINE BESYLATE 5 MG: 5 TABLET ORAL at 08:35

## 2024-07-29 RX ADMIN — ASPIRIN 81 MG 81 MG: 81 TABLET ORAL at 08:34

## 2024-07-29 RX ADMIN — SENNOSIDES AND DOCUSATE SODIUM 1 TABLET: 50; 8.6 TABLET ORAL at 21:10

## 2024-07-29 ASSESSMENT — ENCOUNTER SYMPTOMS
ACTIVITY CHANGE: 1
CONFUSION: 0
DYSPHORIC MOOD: 0
DECREASED CONCENTRATION: 1
AGITATION: 0
NERVOUS/ANXIOUS: 1
HALLUCINATIONS: 0
FATIGUE: 1
MYALGIAS: 0
ARTHRALGIAS: 0
HYPERACTIVE: 0
SLEEP DISTURBANCE: 1
WEAKNESS: 1

## 2024-07-29 ASSESSMENT — COGNITIVE AND FUNCTIONAL STATUS - GENERAL
DAILY ACTIVITIY SCORE: 13
STANDING UP FROM CHAIR USING ARMS: A LOT
TOILETING: A LOT
DRESSING REGULAR UPPER BODY CLOTHING: A LOT
EATING MEALS: A LITTLE
EATING MEALS: A LITTLE
CLIMB 3 TO 5 STEPS WITH RAILING: A LOT
TURNING FROM BACK TO SIDE WHILE IN FLAT BAD: A LOT
MOVING FROM LYING ON BACK TO SITTING ON SIDE OF FLAT BED WITH BEDRAILS: A LOT
MOVING FROM LYING ON BACK TO SITTING ON SIDE OF FLAT BED WITH BEDRAILS: A LOT
TURNING FROM BACK TO SIDE WHILE IN FLAT BAD: A LOT
MOVING TO AND FROM BED TO CHAIR: A LOT
DRESSING REGULAR LOWER BODY CLOTHING: TOTAL
DRESSING REGULAR UPPER BODY CLOTHING: A LOT
STANDING UP FROM CHAIR USING ARMS: A LOT
PERSONAL GROOMING: A LITTLE
WALKING IN HOSPITAL ROOM: A LOT
TOILETING: A LOT
HELP NEEDED FOR BATHING: A LOT
MOBILITY SCORE: 12
DRESSING REGULAR LOWER BODY CLOTHING: TOTAL
HELP NEEDED FOR BATHING: A LOT
MOVING TO AND FROM BED TO CHAIR: A LOT
PERSONAL GROOMING: A LITTLE
CLIMB 3 TO 5 STEPS WITH RAILING: A LOT
WALKING IN HOSPITAL ROOM: A LOT
DAILY ACTIVITIY SCORE: 13
MOBILITY SCORE: 12

## 2024-07-29 ASSESSMENT — ACTIVITIES OF DAILY LIVING (ADL): HOME_MANAGEMENT_TIME_ENTRY: 12

## 2024-07-29 ASSESSMENT — PAIN SCALES - GENERAL
PAINLEVEL_OUTOF10: 0 - NO PAIN

## 2024-07-29 ASSESSMENT — PAIN - FUNCTIONAL ASSESSMENT
PAIN_FUNCTIONAL_ASSESSMENT: 0-10

## 2024-07-29 ASSESSMENT — PAIN SCALES - WONG BAKER: WONGBAKER_NUMERICALRESPONSE: NO HURT

## 2024-07-29 NOTE — PROGRESS NOTES
"Nilam Powell is a 73 y.o. female on day 10 of admission presenting with Cardiac arrest (Multi).      Subjective   The patient's chart reviewed and discussed with their assigned RN.   During today's assessment, the patient appeared somnolent and had difficulty opening her eyes. However, she was able to respond after the RN gently touched her hand. The patient seemed confused and only showed alertness to herself and her date of birth. She was unable to verbalize her location and appeared internally stimulated. The patient believed that she was in a place where childbirth was taking place and being celebrated. Her behavior was more controlled and less impulsive compared to previous encounters. The RN reported that the patient was taking her medications without any issues in the morning and remained free from any need for a sitter.  To address the somnolence, we decided to trial and decrease the dosage of Zyprexa to 2.5mg at bedtime tonight. We appreciate being involved in the patient's case and will continue to follow her progress closely.       Objective     Last Recorded Vitals  Blood pressure 156/89, pulse 65, temperature 36.4 °C (97.5 °F), temperature source Temporal, resp. rate 20, height 1.702 m (5' 7\"), weight 66.8 kg (147 lb 4.3 oz), SpO2 96%.    Review of Systems   Constitutional:  Positive for activity change and fatigue.   Musculoskeletal:  Negative for arthralgias and myalgias.   Neurological:  Positive for weakness.   Psychiatric/Behavioral:  Positive for decreased concentration and sleep disturbance. Negative for agitation, behavioral problems, confusion, dysphoric mood, hallucinations, self-injury and suicidal ideas. The patient is nervous/anxious. The patient is not hyperactive.        Psychiatric ROS - Adult  Anxiety: decreased anxiety  Depression: concentration and energy no depression  Delirium: negative  Psychosis: negative  Kathy: negative  Safety Issues: none  Psychiatric ROS Comment: As " noted      Mental Status Exam  General: alert and awake in hospital attire     Appearance: Fairly groomed.  Attitude/Behavior:Cooperative, conversant, engaged, and with good eye contact.  Motor Activity: No psychomotor agitation or retardation, no tremor or other abnormal movements.  Speech: normal rate, tone and rhythm and soft  Mood: euthymic  Affect: mood-congruent and redirectable  Thought Process: linear, goal directed and circumstantial  Thought Content: Within normal limits  Thought Perception: No perceptual abnormalities noted  Cognition: Cognitively intact to conversational testing with respect to attention, orientation, fund of knowledge, recent and remote memory, and language.  Insight: fair  Judgement: Fair    Psychiatric Risk Assessment  Violence Risk Assessment: other Delirium   Acute Risk of Harm to Others is Considered: low and moderate   Suicide Risk Assessment: age > 65 yrs old  Protective Factors against Suicide: adherence to  treatment, moral objections to suicide, and positive family relationships  Acute Risk of Harm to Self is Considered: low    Current Medications    Scheduled medications  allopurinol, 300 mg, oral, Daily  amiodarone, 200 mg, oral, Daily  amLODIPine, 5 mg, oral, Daily  aspirin, 81 mg, oral, Daily  atorvastatin, 10 mg, oral, Nightly  enoxaparin, 40 mg, subcutaneous, Daily  melatonin, 3 mg, oral, Daily  metoprolol succinate XL, 50 mg, oral, BID  OLANZapine zydis, 5 mg, oral, Nightly  oxygen, , inhalation, Continuous - 02/gases  polyethylene glycol, 17 g, oral, Daily  potassium chloride, 20 mEq, oral, BID  sennosides-docusate sodium, 1 tablet, oral, BID      Continuous medications     PRN medications  PRN medications: acetaminophen, bisacodyl, dextrose, dextrose, glucagon, glucagon, hydrOXYzine HCL, OLANZapine, OLANZapine, oxygen       Medication efficacy: yes mood improved  Patient reporting any side effects? No  Any observed side effects? No      Relevant Results  Results for  orders placed or performed during the hospital encounter of 07/19/24 (from the past 24 hour(s))   Basic metabolic panel   Result Value Ref Range    Glucose 118 (H) 65 - 99 mg/dL    Sodium 140 133 - 145 mmol/L    Potassium 4.4 3.4 - 5.1 mmol/L    Chloride 103 97 - 107 mmol/L    Bicarbonate 20 (L) 24 - 31 mmol/L    Urea Nitrogen 20 8 - 25 mg/dL    Creatinine 1.20 0.40 - 1.60 mg/dL    eGFR 48 (L) >60 mL/min/1.73m*2    Calcium 9.2 8.5 - 10.4 mg/dL    Anion Gap 17 <=19 mmol/L   CBC and Auto Differential   Result Value Ref Range    WBC 12.9 (H) 4.4 - 11.3 x10*3/uL    nRBC 0.0 0.0 - 0.0 /100 WBCs    RBC 4.64 4.00 - 5.20 x10*6/uL    Hemoglobin 12.7 12.0 - 16.0 g/dL    Hematocrit 40.3 36.0 - 46.0 %    MCV 87 80 - 100 fL    MCH 27.4 26.0 - 34.0 pg    MCHC 31.5 (L) 32.0 - 36.0 g/dL    RDW 16.1 (H) 11.5 - 14.5 %    Platelets 220 150 - 450 x10*3/uL    Neutrophils % 76.4 40.0 - 80.0 %    Immature Granulocytes %, Automated 1.1 (H) 0.0 - 0.9 %    Lymphocytes % 12.2 13.0 - 44.0 %    Monocytes % 8.3 2.0 - 10.0 %    Eosinophils % 1.5 0.0 - 6.0 %    Basophils % 0.5 0.0 - 2.0 %    Neutrophils Absolute 9.89 (H) 1.60 - 5.50 x10*3/uL    Immature Granulocytes Absolute, Automated 0.14 0.00 - 0.50 x10*3/uL    Lymphocytes Absolute 1.58 0.80 - 3.00 x10*3/uL    Monocytes Absolute 1.07 (H) 0.05 - 0.80 x10*3/uL    Eosinophils Absolute 0.19 0.00 - 0.40 x10*3/uL    Basophils Absolute 0.07 0.00 - 0.10 x10*3/uL   Magnesium   Result Value Ref Range    Magnesium 2.00 1.60 - 3.10 mg/dL   Phosphorus   Result Value Ref Range    Phosphorus 3.3 2.5 - 4.5 mg/dL               Reviewed    Assessment/Plan   Principal Problem:    Cardiac arrest (Multi)  Active Problems:    Cardiac arrest with ventricular fibrillation (Multi)    Plan/Recommendations       1) Agitation  Likely due to delirium superimposed on underlying cognitive impairment.      Plan:    - Decrease Zyprexa 2.5 mg PO at bedtime for mood stabilization  - Can give olanzapine 2.5 mg PO/ 2.5mg IM Q6H  PRN for agitation  - Continue melatonin 3 mg at 1800 to aid sleep-wake cycle  - Continue Hydroxyzine 25mg Q6H PRN for anxiety, restlessness     Would recommend the use of delirium precautions with this patient:   -- Minimize use of benzos, opiates, anticholinergics, as these may worsen mental status   -- Would use caution with narcotic pain medications   -- Would still adequately controlling pain, as uncontrolled pain is also a risk factor for delirium   -- Reinforce sleep hygiene; encourage patient to stay awake during the day   -- Keep curtains/blinds open during the day and closed at night.   -- Would recommend reorienting/redirecting patient as much as possible,    -- Aim for consistent staffing, familiar objects, avoiding bright lights and loud noises, etc.    The patient does not meet the criteria for the inpatient psychiatric treatment. Appreciate Discharge Dispo or next steps from the Care Coordinator. Thank you for allowing us to participate in the care of this patient. We will continue to follow.      Medication Consent  Medication Consent: risks, benefits, side effects reviewed for all ordered meds           I personally spent 25 minutes today providing care for this patient, including preparation, face to face time, documentation and other services such as review of medical records, diagnostic result, patient education, counseling, coordination of care as specified in the encounter. Discussed side effects with opportunity to ask questions and questions answered.  Patient given consent to the plan as noted.     Parts of this chart have been completed using voice recognition software.  Please excuse any errors of transcription.  Despite the medical decision making time stamp, my medical decision making has taken place during the patient's entire visit.  Thought process and reason for plan has been formulated from the time that I saw the patient until the time of disposition and is not specific to one  specific moment during their visit and furthermore the medical decision making encompasses the entire chart and not only that represented in this note.     Diana Kessler, APRN-CNP

## 2024-07-29 NOTE — PROGRESS NOTES
"Nilam Powell is a 73 y.o. female on day 10 of admission presenting with Cardiac arrest (Multi).    Subjective    Neuro confused and answers not appropriately oriented to self    Objective   Intermittent rapid rates may be be atrial fibrillation or SVT telemetry mostly was sinus and PVCs    Physical Exam    Last Recorded Vitals  Blood pressure (!) 125/91, pulse 65, temperature 36.2 °C (97.2 °F), temperature source Temporal, resp. rate 20, height 1.702 m (5' 7\"), weight 89 kg (196 lb 3.4 oz), SpO2 95%.  Intake/Output last 3 Shifts:  I/O last 3 completed shifts:  In: 550 (8.2 mL/kg) [P.O.:550]  Out: 1450 (21.7 mL/kg) [Urine:1450 (0.6 mL/kg/hr)]  Weight: 66.8 kg     Relevant Results  Results for orders placed or performed during the hospital encounter of 07/19/24 (from the past 24 hour(s))   Basic metabolic panel   Result Value Ref Range    Glucose 118 (H) 65 - 99 mg/dL    Sodium 140 133 - 145 mmol/L    Potassium 4.4 3.4 - 5.1 mmol/L    Chloride 103 97 - 107 mmol/L    Bicarbonate 20 (L) 24 - 31 mmol/L    Urea Nitrogen 20 8 - 25 mg/dL    Creatinine 1.20 0.40 - 1.60 mg/dL    eGFR 48 (L) >60 mL/min/1.73m*2    Calcium 9.2 8.5 - 10.4 mg/dL    Anion Gap 17 <=19 mmol/L   CBC and Auto Differential   Result Value Ref Range    WBC 12.9 (H) 4.4 - 11.3 x10*3/uL    nRBC 0.0 0.0 - 0.0 /100 WBCs    RBC 4.64 4.00 - 5.20 x10*6/uL    Hemoglobin 12.7 12.0 - 16.0 g/dL    Hematocrit 40.3 36.0 - 46.0 %    MCV 87 80 - 100 fL    MCH 27.4 26.0 - 34.0 pg    MCHC 31.5 (L) 32.0 - 36.0 g/dL    RDW 16.1 (H) 11.5 - 14.5 %    Platelets 220 150 - 450 x10*3/uL    Neutrophils % 76.4 40.0 - 80.0 %    Immature Granulocytes %, Automated 1.1 (H) 0.0 - 0.9 %    Lymphocytes % 12.2 13.0 - 44.0 %    Monocytes % 8.3 2.0 - 10.0 %    Eosinophils % 1.5 0.0 - 6.0 %    Basophils % 0.5 0.0 - 2.0 %    Neutrophils Absolute 9.89 (H) 1.60 - 5.50 x10*3/uL    Immature Granulocytes Absolute, Automated 0.14 0.00 - 0.50 x10*3/uL    Lymphocytes Absolute 1.58 0.80 - 3.00 " x10*3/uL    Monocytes Absolute 1.07 (H) 0.05 - 0.80 x10*3/uL    Eosinophils Absolute 0.19 0.00 - 0.40 x10*3/uL    Basophils Absolute 0.07 0.00 - 0.10 x10*3/uL   Magnesium   Result Value Ref Range    Magnesium 2.00 1.60 - 3.10 mg/dL   Phosphorus   Result Value Ref Range    Phosphorus 3.3 2.5 - 4.5 mg/dL         Assessment/Plan   Principal Problem:    Cardiac arrest (Multi)  Active Problems:    Cardiac arrest with ventricular fibrillation (Multi)  Underwent cardiac catheterization without any obstructive lesion requiring revascularization  Preserved left ventricular function  Tolerating beta-blocker, occasional VPB's  Neurology continuing to evaluate, EEG done today to rule out seizures  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  Will continue to follow in terms of placement of ICD for secondary prevention, would like to see mental status and confusion improved with possible  Be best to maintain potassium greater than 4.0, magnesium greater than 2.0  Utilize beta-blocker orally as able  7/29:  Patient's mental status still with confusion oriented to self, poor memory left second to possibly anoxia at the time of admission, discussed with  regarding ICD on Friday at this time do not want any procedure  Has occasional VPB's though now having more intermittent rapid rates will increase beta-blocker to 75 mg twice daily     Discussed with Dr. Goldman and hospitalist  I spent 20 minutes in the professional and overall care of this patient.      Grace Russ, APRN-CNP

## 2024-07-29 NOTE — NURSING NOTE
Assumed pt care 0700. Pt was awake in bed, lying flat, pleasantly confused. Rn to Rn bedside shift report revealed AMS only x1 to self. No complaints of pain at this time. Plan for AICD placement following prior cardiac arrest. Discussion with family pending. Pt on RA w/o SOB, belongings and call light within reach. Continuing to monitor.

## 2024-07-29 NOTE — PROGRESS NOTES
07/29/24 1407   Discharge Planning   Expected Discharge Disposition HH Services     TCC spoke to patient spouse, he states he want to take patient home with HHC. TCC educated spouse on fuctional states and need for 2 person assist. He states he and his sons will be there 24/7 and will be able to take care of her. TCC offered caregiver list to have aid help in the home. Spouse declined   HHC referrals sent at this time     Patient accepted to [facility] at this time  Will need external referral for home health upon discharge  ** do not discharge without speaking to care coordination**;

## 2024-07-29 NOTE — PROGRESS NOTES
Yan Powell is a 73 y.o. female on day 10 of admission presenting with Cardiac arrest (Multi).      Subjective   Seen laying comfortably in bed. States she is tired this morning. Currently A&Ox1 to self only. Reports having a BM yesterday, denies constipation. States she's not very hungry this morning.        Objective     Last Recorded Vitals  /89 (BP Location: Left arm, Patient Position: Lying)   Pulse 65   Temp 36.4 °C (97.5 °F) (Temporal)   Resp 20   Wt 66.8 kg (147 lb 4.3 oz)   SpO2 96%   Intake/Output last 3 Shifts:    Intake/Output Summary (Last 24 hours) at 7/29/2024 0932  Last data filed at 7/29/2024 0409  Gross per 24 hour   Intake 450 ml   Output 450 ml   Net 0 ml       Admission Weight  Weight: 117 kg (258 lb 6.1 oz) (07/19/24 1251)    Daily Weight  07/28/24 : 66.8 kg (147 lb 4.3 oz)    Image Results  Transthoracic Echo (TTE) [place] [address]             Phone [phone]    TRANSTHORACIC ECHOCARDIOGRAM REPORT    Patient Name:      YAN POWELL         Reading Physician:    [id number] Juan Byrd MD  Study Date:        7/19/2024             Ordering Provider:    [id number] FLETCHER ABREU  MRN/PID:           [id number]              Fellow:  Accession#:        [id number]          Nurse:  Date of Birth/Age: 1950 / 73 years Sonographer:          Jennifer Childers RDCS  Gender:            F                     Additional Staff:  Height:            170.00 cm             Admit Date:  Weight:            117.00 kg             Admission Status:     Inpatient -                                                                 Routine  BSA / BMI:         2.25 m2 / 40.48 kg/m2 Department Location:  [facility] ICU  Blood Pressure: 125 /72  mmHg    Study Type:    TRANSTHORACIC ECHO (TTE) COMPLETE  Diagnosis/ICD: Cardiac arrest, cause unspecified-I46.9  Indication:    cardiac arrest  CPT Codes:     Echo Complete w Full [id number]    Patient History:  Pertinent History: Cardiac arrest vent hypotensive.    Study Detail: The following Echo studies were performed: 2D, M-Mode, Doppler and                color flow. Technically challenging study due to prominent lung                artifact, body habitus and patient lying in supine position.                Definity used as a contrast agent for endocardial border                definition. Total contrast used for this procedure was 2 mL via IV                push.       PHYSICIAN INTERPRETATION:  Left Ventricle: Left ventricular ejection fraction is mildly decreased, by visual estimate at 50%. There is global hypokinesis of the left ventricle with minor regional variations. The left ventricular cavity size is normal. Spectral Doppler shows a normal pattern of left ventricular diastolic filling.  Left Atrium: The left atrium is normal in size.  Right Ventricle: The right ventricle is normal in size. There is normal right ventricular global systolic function.  Right Atrium: The right atrium is normal in size.  Aortic Valve: The aortic valve appears structurally normal. The aortic valve dimensionless index is 0.42. There is mild aortic valve regurgitation. The peak instantaneous gradient of the aortic valve is 15.7 mmHg. The mean gradient of the aortic valve is 9.0 mmHg.  Mitral Valve: The mitral valve is normal in structure. There is no evidence of mitral valve regurgitation.  Tricuspid Valve: The tricuspid valve is structurally normal. There is trace tricuspid regurgitation. The Doppler estimated RVSP is mildly elevated at 33.0 mmHg.  Pulmonic Valve: The pulmonic valve is structurally normal. There is physiologic pulmonic valve regurgitation.  Pericardium: There is no pericardial effusion noted.  Aorta: The  aortic root is normal.  Systemic Veins: The inferior vena cava appears dilated. There is less than 50% IVC collapse with inspiration.       CONCLUSIONS:   1. Poorly visualized anatomical structures due to suboptimal image quality.   2. Left ventricular ejection fraction is mildly decreased, by visual estimate at 50%.   3. There is global hypokinesis of the left ventricle with minor regional variations.   4. There is normal right ventricular global systolic function.   5. No evidence of mitral valve regurgitation.   6. Mildly elevated RVSP.   7. Trace tricuspid regurgitation is visualized.   8. Mild aortic valve regurgitation.    QUANTITATIVE DATA SUMMARY:  2D MEASUREMENTS:                            Normal Ranges:  LAs:           2.80 cm    (2.7-4.0cm)  IVSd:          1.04 cm    (0.6-1.1cm)  LVPWd:         0.82 cm    (0.6-1.1cm)  LVIDd:         6.22 cm    (3.9-5.9cm)  LV Mass Index: 106.0 g/m2    LV SYSTOLIC FUNCTION BY 2D PLANIMETRY (MOD):                       Normal Ranges:  EF-A4C View:    50 % (>=55%)  EF-A2C View:    46 %  EF-Biplane:     50 %  EF-Visual:      50 %  LV EF Reported: 50 %    LV DIASTOLIC FUNCTION:                      Normal Ranges:  MV Peak E: 0.58 m/s (0.7-1.2 m/s)  MV Peak A: 0.82 m/s (0.42-0.7 m/s)  E/A Ratio: 0.71     (1.0-2.2)    MITRAL VALVE:                  Normal Ranges:  MV DT: 354 msec (150-240msec)    AORTIC VALVE:                                     Normal Ranges:  AoV Vmax:                1.98 m/s  (<=1.7m/s)  AoV Peak PG:             15.7 mmHg (<20mmHg)  AoV Mean P.0 mmHg  (1.7-11.5mmHg)  LVOT Max Dao:            0.78 m/s  (<=1.1m/s)  AoV VTI:                 39.70 cm  (18-25cm)  LVOT VTI:                16.50 cm  LVOT Diameter:           2.00 cm   (1.8-2.4cm)  AoV Area, VTI:           1.31 cm2  (2.5-5.5cm2)  AoV Area,Vmax:           1.23 cm2  (2.5-4.5cm2)  AoV Dimensionless Index: 0.42    TRICUSPID VALVE/RVSP:                              Normal Ranges:  Peak TR  Velocity: 2.12 m/s  RV Syst Pressure: 33.0 mmHg (< 30mmHg)  IVC Diam:         2.27 cm    PULMONIC VALVE:                       Normal Ranges:  PV Max Dao: 0.7 m/s  (0.6-0.9m/s)  PV Max P.1 mmHg       [id number] Juan Byrd MD  Electronically signed on 2024 at 3:34:25 PM       ** Final **      Physical Exam  Constitutional:       General: She is not in acute distress.     Appearance: She is not toxic-appearing.   HENT:      Head: Normocephalic.      Mouth/Throat:      Mouth: Mucous membranes are dry.      Pharynx: Oropharynx is clear.   Eyes:      General: No scleral icterus.  Cardiovascular:      Rate and Rhythm: Normal rate.   Pulmonary:      Effort: No respiratory distress.      Breath sounds: No wheezing.   Abdominal:      General: There is no distension.      Palpations: Abdomen is soft.   Musculoskeletal:      Right lower leg: No edema.      Left lower leg: No edema.   Neurological:      Mental Status: She is alert. She is disoriented.      Comments: A&Ox1, self only         Relevant Results               Assessment/Plan          Principal Problem:    Cardiac arrest (Multi)  Active Problems:    Cardiac arrest with ventricular fibrillation (Multi)    Vfib cardiac arrest   EP following  Cardiology following  S/p cardiac catheterization , no obstructing lesions, did not require PCI  Possible ICD placement during this admission per EP  Continue metoprolol and amiodarone    Acute metabolic encephalopathy  Evaluated by neurology  Psychiatry following  Suspect 2/2 anoxic brain injury  Remains A&Ox1 to self only  S/p EEG  showing mild diffuse encephalopathy  Continue zyprexa and melatonin nightly  Continue hydroxyzine PRN and olanzapine PRN for anxiety/agitation per psych    Leukocytosis  Completed a course of antibiotics in the ICU  Monitor off antibiotics for now in the absence of s/s of infection    Acute hypoxic and hypercapnic respiratory failure, resolved  Stable on room air    Hypokalemia,  resolved  Replace PRN    Debility  PT/OT- recommending moderate intensity rehab  Awaiting family decision regarding discharge to SNF vs home    Plan:  Awaiting EP eval - possible ICD placement during this admission  Awaiting family decision regarding discharge plans                         Araseli Nash MD

## 2024-07-29 NOTE — PROGRESS NOTES
Physical Therapy    Physical Therapy Treatment    Patient Name: Nilam Powell  MRN: [id number]  Today's Date: 7/29/2024  Time Calculation  Start Time: 0907  Stop Time: 0930  Time Calculation (min): 23 min    Assessment/Plan   PT Assessment  End of Session Communication: Bedside nurse  End of Session Patient Position: Up in chair, Alarm on  PT Plan  Inpatient/Swing Bed or Outpatient: Inpatient  PT Plan  Treatment/Interventions: Bed mobility, Transfer training, Gait training, Stair training, Balance training, Neuromuscular re-education, Strengthening, Endurance training, Range of motion, Therapeutic exercise, Therapeutic activity  PT Plan: Ongoing PT  PT Frequency: 6 times per week  PT Discharge Recommendations: Moderate intensity level of continued care  Equipment Recommended upon Discharge: Wheeled walker  PT Recommended Transfer Status: Assist x2  PT - OK to Discharge: Yes      General Visit Information:   PT  Visit  PT Received On: 07/29/24  General  Co-Treatment: PT  Co-Treatment Reason: Medically complex pt requiring 2 person assist for safety.  Prior to Session Communication: Bedside nurse  General Comment: Cleared by nursing to be seen for therapy, pt agreeable with tx, supine in bed upon arrival.    Subjective     Objective   Pain:  Pain Assessment  Pain Assessment: 0-10  0-10 (Numeric) Pain Score: 0 - No pain    Cognition:  Cognition  Overall Cognitive Status: Impaired  Orientation Level: Disoriented to place, Disoriented to time, Disoriented to situation    Postural Control:  Static Sitting Balance  Static Sitting-Balance Support: Bilateral upper extremity supported, Feet supported  Static Sitting-Level of Assistance: Minimum assistance  Static Sitting-Comment/Number of Minutes: Poor+ seated static balance x5 minutes, presents with retropulsion requiring min assist to maintain midline.  Static Standing Balance  Static Standing-Balance Support: Bilateral upper extremity supported  Static Standing-Level of  Assistance: Moderate assistance  Static Standing-Comment/Number of Minutes: Poor static standing balance requiring mod assist x2.    Treatments:  Therapeutic Exercise  Therapeutic Exercise Performed: Yes  Therapeutic Exercise Activity 1: Bilateral ankle pumps x10  Therapeutic Exercise Activity 2: Bilateral hip flexion x10  Therapeutic Exercise Activity 3: Bilateral knee extension x10    Bed Mobility  Bed Mobility: Yes  Bed Mobility 1  Bed Mobility 1: Supine to sitting  Level of Assistance 1: Moderate assistance  Bed Mobility Comments 1: Mod assist for trunk/bilateral LE's during supine to sit, mod assist to scoot EOB with use of draw sheet.    Ambulation/Gait Training  Ambulation/Gait Training Performed: Yes  Ambulation/Gait Training 1  Surface 1: Level tile  Device 1: Rolling walker  Assistance 1: Moderate assistance  Quality of Gait 1: Wide base of support, Decreased step length, Diminished heel strike, Forward flexed posture  Comments/Distance (ft) 1: 5-6 steps (bed to chair) with wheeled walker, presents with flexed posture, requires assist to maneuver walker, mod assist x2 for balance.    Transfer 1  Transfer From 1: Sit to  Transfer to 1: Stand  Technique 1: Sit to stand, Stand to sit  Transfer Level of Assistance 1: Moderate assistance  Trials/Comments 1: Mod assist x2 for trunk up during sit to stand, poor static standing balance, cues for proper hand placement, decreased eccentric control in sitting.    Outcome Measures:  [facility] Basic Mobility  Turning from your back to your side while in a flat bed without using bedrails: A lot  Moving from lying on your back to sitting on the side of a flat bed without using bedrails: A lot  Moving to and from bed to chair (including a wheelchair): A lot  Standing up from a chair using your arms (e.g. wheelchair or bedside chair): A lot  To walk in hospital room: A lot  Climbing 3-5 steps with railing: A lot  Basic Mobility - Total Score: 12    Encounter Problems        Encounter Problems (Active)       Balance       STG - Maintains static standing balance with upper extremity support and min assist x 1. (Progressing)       Start:  07/22/24    Expected End:  08/22/24       INTERVENTIONS:  1. Practice standing with minimal support.  2. Educate patient about standing tolerance.  3. Educate patient about independence with gait, transfers, and ADL's.  4. Educate patient about use of assistive device.  5. Educate patient about self-directed care.         STG - Maintains static sitting balance with upper extremity support and supervision for safety. (Progressing)       Start:  07/22/24    Expected End:  08/22/24       INTERVENTIONS:  1. Practice sitting on the edge of a bed/mat with minimal support.  2. Educate patient about maintining total hip precautions while maintaining balance.  3. Educate patient about pressure relief.  4. Educate patient about use of assistive device.            Mobility       STG - Patient will ambulate 50' with use of rolling walker and min assist x 1. (Progressing)       Start:  07/22/24    Expected End:  08/22/24            STG - Patient will ambulate up and down a curb/step with min assist x 1. (Not Progressing)       Start:  07/22/24    Expected End:  08/22/24               PT Transfers       STG - Patient to transfer to and from sit to supine with supervision for safety. (Progressing)       Start:  07/22/24    Expected End:  08/22/24            STG - Patient will transfer sit to and from stand with use of rolling walker and min assist x 1. (Progressing)       Start:  07/22/24    Expected End:  08/22/24               Pain - Adult          Safety       STG - Patient uses call light consistently to request assistance with transfers (Progressing)       Start:  07/22/24    Expected End:  08/22/24

## 2024-07-29 NOTE — PROGRESS NOTES
"Nutrition Follow up Note    Nutrition Assessment      Patient remains AxO to self only. PO intake is minimal. Will add Magic Cups TID at this time.    Nutrition History:     Energy Intake: Poor < 50 %  Food Allergies/Intolerances:  None  GI Symptoms: None  Oral Problems: None    Anthropometrics:  Ht: 170.2 cm (5' 7\"), Wt: 66.8 kg (147 lb 4.3 oz), BMI: 23.06  IBW/kg (Dietitian Calculated): 61 kg  Percent of IBW: 109 %     Weight Change:  Daily Weight  07/28/24 : 66.8 kg (147 lb 4.3 oz)     Nutrition Focused Physical Exam Findings:   Subcutaneous Fat Loss  Orbital Fat Pads: Well nourished (slightly bulging fat pads)  Buccal Fat Pads: Well nourished (full, rounded cheeks)  Triceps: Well nourished (ample fat tissue)  Ribs: Defer    Muscle Wasting  Temporalis: Well nourished (well-defined muscle)  Pectoralis (Clavicular Region): Well nourished (clavicle not visible)  Deltoid/Trapezius: Well nourished (rounded appearance at arm, shoulder, neck)  Interosseous: Defer  Trapezius/Infraspinatus/Supraspinatus (Scapular Region): Defer  Quadriceps: Defer  Gastrocnemius: Defer    Nutrition Significant Labs:  Lab Results   Component Value Date    WBC 12.9 (H) 07/29/2024    HGB 12.7 07/29/2024    HCT 40.3 07/29/2024     07/29/2024    ALT 38 07/25/2024    AST 24 07/25/2024     07/29/2024    K 4.4 07/29/2024     07/29/2024    CREATININE 1.20 07/29/2024    BUN 20 07/29/2024    CO2 20 (L) 07/29/2024    TSH 2.98 07/19/2024    INR 1.0 07/19/2024    HGBA1C 5.7 (H) 06/29/2023     Nutrition Specific Medications:  allopurinol, 300 mg, oral, Daily  amiodarone, 200 mg, oral, Daily  amLODIPine, 5 mg, oral, Daily  aspirin, 81 mg, oral, Daily  atorvastatin, 10 mg, oral, Nightly  enoxaparin, 40 mg, subcutaneous, Daily  melatonin, 3 mg, oral, Daily  metoprolol succinate XL, 50 mg, oral, BID  OLANZapine zydis, 5 mg, oral, Nightly  oxygen, , inhalation, Continuous - 02/gases  polyethylene glycol, 17 g, oral, Daily  potassium " chloride, 20 mEq, oral, BID  sennosides-docusate sodium, 1 tablet, oral, BID         Dietary Orders (From admission, onward)       Start     Ordered    07/29/24 1034  Oral nutritional supplements  Until discontinued        Comments: Any flavor   Question Answer Comment   Deliver with All meals    Select supplement: Magic Cup        07/29/24 1033    07/24/24 0939  Adult diet Cardiac; 70 gm fat; 2 - 3 grams Sodium; 1:1 Feeding  Diet effective now        Comments: Compensatory Swallowing Strategies:                                                                                                                                                                                                                                                           Upright 90 degrees as possible for all oral intake, single sips/bites,  upright after meals   Question Answer Comment   Diet type Cardiac    Fat restriction: 70 gm fat    Sodium restriction: 2 - 3 grams Sodium    Select tray type: 1:1 Feeding        07/24/24 0939                   Estimated Needs:   Estimated Energy Needs  Total Energy Estimated Needs (kCal): 1675 kCal  Total Estimated Energy Need per Day (kCal/kg): 25 kCal/kg  Method for Estimating Needs: Actual Wt    Estimated Protein Needs  Total Protein Estimated Needs (g): 67 g  Total Protein Estimated Needs (g/kg): 1 g/kg  Method for Estimating Needs: Actual Wt    Estimated Fluid Needs  Total Fluid Estimated Needs (mL): 1675 mL  Method for Estimating Needs: 1 mL/kcal      Nutrition Diagnosis   Nutrition Diagnosis:     Nutrition Diagnosis  Patient has Nutrition Diagnosis: Yes  Diagnosis Status (1): Resolved  Nutrition Diagnosis 1: Inadequate energy intake  Related to (1): decreased ability to consume sufficient energy  As Evidenced by (1): NPO  Additional Nutrition Diagnosis: Diagnosis 2  Diagnosis Status (2): New  Nutrition Diagnosis 2: Inadequate energy intake  Related to (2): decreased ability to consume sufficient  energy  As Evidenced by (2): poor po intake       Nutrition Interventions/Recommendations   Nutrition Interventions and Recommendations:    Nutrition Prescription:  Individualized Nutrition Prescription Provided for : 1675 calories, 67 gm protein to be provided via diet/nutritional supplements    Nutrition Interventions:   Food and/or Nutrient Delivery Interventions  Interventions: Meals and snacks, Medical food supplement  Meals and Snacks: Fat-modified diet, Mineral-modified diet  Goal: provide as ordered  Medical Food Supplement: Modified food  Goal: magic cup TID to provide 290 kcals and 9g protein each    Education Documentation  No documentation found.         Nutrition Monitoring and Evaluation   Monitoring/Evaluation:   Food/Nutrient Related History Monitoring  Monitoring and Evaluation Plan: Energy intake  Energy Intake: Estimated energy intake  Criteria: pt to consume >/= 75% estimated needs       Time Spent/Follow-up:   Follow Up  Time Spent (min): 25 minutes  Last Date of Nutrition Visit: 07/29/24  Nutrition Follow-Up Needed?: 5-7 days  Follow up Comment: 8/2/24

## 2024-07-29 NOTE — CARE PLAN
The patient's goals for the shift include      The clinical goals for the shift include safety, rest    Problem: Knowledge Deficit  Goal: Patient/family/caregiver demonstrates understanding of disease process, treatment plan, medications, and discharge instructions  Outcome: Progressing     Problem: Pain - Adult  Goal: Verbalizes/displays adequate comfort level or baseline comfort level  Outcome: Progressing     Problem: Safety - Adult  Goal: Free from fall injury  Outcome: Progressing     Problem: Discharge Planning  Goal: Discharge to home or other facility with appropriate resources  Outcome: Progressing     Problem: Chronic Conditions and Co-morbidities  Goal: Patient's chronic conditions and co-morbidity symptoms are monitored and maintained or improved  Outcome: Progressing     Problem: Skin  Goal: Decreased wound size/increased tissue granulation at next dressing change  Outcome: Progressing  Goal: Participates in plan/prevention/treatment measures  Outcome: Progressing  Goal: Prevent/manage excess moisture  Outcome: Progressing  Goal: Prevent/minimize sheer/friction injuries  Outcome: Progressing  Goal: Promote/optimize nutrition  Outcome: Progressing  Goal: Promote skin healing  Outcome: Progressing     Problem: Nutrition  Goal: Less than 5 days NPO/clear liquids  Outcome: Progressing  Goal: Oral intake greater than 50%  Outcome: Progressing  Goal: Oral intake greater 75%  Outcome: Progressing  Goal: Consume prescribed supplement  Outcome: Progressing  Goal: Adequate PO fluid intake  Outcome: Progressing  Goal: Nutrition support goals are met within 48 hrs  Outcome: Progressing  Goal: Nutrition support is meeting 75% of nutrient needs  Outcome: Progressing  Goal: Tube feed tolerance  Outcome: Progressing  Goal: BG  mg/dL  Outcome: Progressing  Goal: Lab values WNL  Outcome: Progressing  Goal: Electrolytes WNL  Outcome: Progressing  Goal: Promote healing  Outcome: Progressing  Goal: Maintain stable  weight  Outcome: Progressing     Problem: Fall/Injury  Goal: Not fall by end of shift  Outcome: Progressing  Goal: Be free from injury by end of the shift  Outcome: Progressing  Goal: Verbalize understanding of personal risk factors for fall in the hospital  Outcome: Progressing  Goal: Verbalize understanding of risk factor reduction measures to prevent injury from fall in the home  Outcome: Progressing  Goal: Use assistive devices by end of the shift  Outcome: Progressing  Goal: Pace activities to prevent fatigue by end of the shift  Outcome: Progressing     Problem: Risk for falls  Goal: I will remain free from falls  Outcome: Progressing     Problem: ADLs  Goal: Pt will complete ADL tasks at mod I with use of AE prn   Outcome: Progressing     Problem: Mechanical Ventilation  Goal: Patient Will Maintain Patent Airway  Outcome: Met  Goal: Oral health is maintained or improved  Outcome: Met  Goal: ET tube will be managed safely  Outcome: Met  Goal: Ability to express needs and understand communication  Outcome: Met  Goal: Mobility/activity is maintained at optimum level for patient  Outcome: Met

## 2024-07-29 NOTE — PROGRESS NOTES
Occupational Therapy    OT Treatment    Patient Name: Nilam Powell  MRN: [id number]  Today's Date: 7/29/2024  Time Calculation  Start Time: 0908  Stop Time: 0932  Time Calculation (min): 24 min        Assessment:  OT Assessment: Gradual progress made towards OT goals. Continue with current OT POC to increase strength, balance and functional tolerance to maximize safety and independence during ADLs.  Barriers to Discharge: Inaccessible home environment, Decreased caregiver support  Evaluation/Treatment Tolerance: Patient tolerated treatment well  End of Session Communication: Bedside nurse  End of Session Patient Position: Up in chair, Alarm on (all needs in reach)  OT Assessment Results: Decreased ADL status, Decreased cognition, Decreased endurance, Decreased safe judgment during ADL, Decreased functional mobility, Decreased trunk control for functional activities  Barriers to Discharge: Inaccessible home environment, Decreased caregiver support  Evaluation/Treatment Tolerance: Patient tolerated treatment well  Plan:  Treatment Interventions: ADL retraining, Functional transfer training, UE strengthening/ROM, Endurance training, Cognitive reorientation, Patient/family training, Equipment evaluation/education, Compensatory technique education  OT Frequency: 4 times per week (Collaborated with OTR- pt would benefit from continued OT services x4 week to maximize potential prior to discharge.)  OT Discharge Recommendations: Moderate intensity level of continued care  Equipment Recommended upon Discharge: Wheeled walker  OT Recommended Transfer Status: Assist of 2, Moderate assist  OT - OK to Discharge: Yes  Treatment Interventions: ADL retraining, Functional transfer training, UE strengthening/ROM, Endurance training, Cognitive reorientation, Patient/family training, Equipment evaluation/education, Compensatory technique education    Subjective   Previous Visit Info:  OT Last Visit  OT Received On:  07/29/24  General:  General  Reason for Referral: impaired ADLs s/p cardiac arrest requiring intubation/ extubation, AMS  Past Medical History Relevant to Rehab: HTN, DLP, gout, uterine fibroid, Colonoscopy, hysterectomy  Co-Treatment: PT  Co-Treatment Reason: Co-tx d/t pt heavy assist level and decreased functional tolerance. Co-tx provided this date to maximize safety and functional outcomes.  Prior to Session Communication: Bedside nurse  General Comment: Pt cleared for therapy session per nursing, pleasant and cooperative this date.  Precautions:  Hearing/Visual Limitations: Glasses  Medical Precautions: Fall precautions  Precautions Comment: purewick, telemetry  Vital Signs:     Pain:  Pain Assessment  Pain Assessment: 0-10  0-10 (Numeric) Pain Score: 0 - No pain  Clinical Progression: Not changed    Objective    Cognition:  Cognition  Overall Cognitive Status: Impaired  Orientation Level: Disoriented to place, Disoriented to time, Disoriented to situation  Following Commands: Follows one step commands with repetition  Safety/Judgement: Exceptions to WFL  Insight: Moderate  Impulsive: Mildly  Task Initiation: Initiates with cues  Processing Speed: Delayed  Coordination:  Movements are Fluid and Coordinated: No  Upper Body Coordination: slower rate of movements  Lower Body Coordination: slower rate of movements  Activities of Daily Living: UE Bathing  UE Bathing Comments: UB bathing tasks completed seated in bedside chair with set-up and Radha. Mod verbal cues required to complete for attention to task.  Functional Standing Tolerance:     Bed Mobility/Transfers: Bed Mobility  Bed Mobility: Yes  Bed Mobility 1  Bed Mobility 1: Supine to sitting, Scooting  Level of Assistance 1: Moderate assistance  Bed Mobility Comments 1: increased time and effort required for task completion, HOB moderately elevated. Verbal/ tactile cues required for proper hand placement to maximize pt potential. Scooting tasks completed  towards EOB with use of draw sheet    Transfers  Transfer: Yes  Transfer 1  Trials/Comments 1: sit<>stand completed from standard EOB height with modAx2- verbal/ tactile cues required for proper hand placement to increase safety and decrease risk of falls. Decreased static standing balance observed this date. Bed>chair completed as stand-step-pivot transfer with modAx1+1 for line management    Therapy/Activity: Therapeutic Exercise  Therapeutic Exercise Performed: Yes  Therapeutic Exercise Activity 1: BUE exercises completed 1x10 with min resistance for following exercises: wrist flexion/ extension, pronation/supination, bicep curl, triceps extension, and shoulder press  Therapeutic Exercise Activity 2: Exercises completed this date to increase strength and functional tolerance for safety and ease of ADL/ADL transfer completion. Verbal instruction and demonstration provided to ensure proper muscle recruitment.      Outcome Measures:[facility] Daily Activity  Putting on and taking off regular lower body clothing: Total  Bathing (including washing, rinsing, drying): A lot  Putting on and taking off regular upper body clothing: A lot  Toileting, which includes using toilet, bedpan or urinal: A lot  Taking care of personal grooming such as brushing teeth: A little  Eating Meals: A little  Daily Activity - Total Score: 13        Education Documentation  Body Mechanics, taught by LINN Walls at 7/29/2024  1:34 PM.  Learner: Patient  Readiness: Acceptance  Method: Explanation, Demonstration  Response: Needs Reinforcement    ADL Training, taught by LINN Walls at 7/29/2024  1:34 PM.  Learner: Patient  Readiness: Acceptance  Method: Explanation, Demonstration  Response: Needs Reinforcement    Education Comments  Education provided on role of OT/POC, safety awareness throughout functional tasks/transfers, importance of activity/ rest routine, EC/WS techniques, and use of call light for assistance. Questions,  comments and concerns addressed regarding OT.      Goals:  Encounter Problems       Encounter Problems (Active)       ADLs       Pt will complete ADL tasks at mod I with use of AE prn  (Progressing)       Start:  07/22/24    Expected End:  07/29/24               Functional Mobility       Pt will perform functional mobility household distances at mod I with use of RW.    (Progressing)       Start:  07/22/24    Expected End:  08/26/24               OT Transfers       Pt will perform BUE exercises to improve strength and independence with functional transfers/ADL tasks.   (Progressing)       Start:  07/22/24    Expected End:  08/26/24

## 2024-07-29 NOTE — DISCHARGE INSTR - OTHER ORDERS
[facility]  [id number], [place] [address] [phone]

## 2024-07-29 NOTE — NURSING NOTE
Pt bed weight inaccurate, following up on how to get pt up and out of bed in order to zero bed prior to weighing pt.

## 2024-07-30 LAB
ANION GAP SERPL CALC-SCNC: 14 MMOL/L
BASOPHILS # BLD AUTO: 0.06 X10*3/UL (ref 0–0.1)
BASOPHILS NFR BLD AUTO: 0.6 %
BUN SERPL-MCNC: 21 MG/DL (ref 8–25)
CALCIUM SERPL-MCNC: 8.8 MG/DL (ref 8.5–10.4)
CHLORIDE SERPL-SCNC: 105 MMOL/L (ref 97–107)
CO2 SERPL-SCNC: 22 MMOL/L (ref 24–31)
CREAT SERPL-MCNC: 1.3 MG/DL (ref 0.4–1.6)
EGFRCR SERPLBLD CKD-EPI 2021: 44 ML/MIN/1.73M*2
EOSINOPHIL # BLD AUTO: 0.2 X10*3/UL (ref 0–0.4)
EOSINOPHIL NFR BLD AUTO: 1.8 %
ERYTHROCYTE [DISTWIDTH] IN BLOOD BY AUTOMATED COUNT: 15.9 % (ref 11.5–14.5)
GLUCOSE SERPL-MCNC: 101 MG/DL (ref 65–99)
HCT VFR BLD AUTO: 36.4 % (ref 36–46)
HGB BLD-MCNC: 11.6 G/DL (ref 12–16)
IMM GRANULOCYTES # BLD AUTO: 0.13 X10*3/UL (ref 0–0.5)
IMM GRANULOCYTES NFR BLD AUTO: 1.2 % (ref 0–0.9)
LYMPHOCYTES # BLD AUTO: 1.66 X10*3/UL (ref 0.8–3)
LYMPHOCYTES NFR BLD AUTO: 15.2 %
MAGNESIUM SERPL-MCNC: 2 MG/DL (ref 1.6–3.1)
MCH RBC QN AUTO: 27.6 PG (ref 26–34)
MCHC RBC AUTO-ENTMCNC: 31.9 G/DL (ref 32–36)
MCV RBC AUTO: 87 FL (ref 80–100)
MONOCYTES # BLD AUTO: 0.92 X10*3/UL (ref 0.05–0.8)
MONOCYTES NFR BLD AUTO: 8.4 %
NEUTROPHILS # BLD AUTO: 7.92 X10*3/UL (ref 1.6–5.5)
NEUTROPHILS NFR BLD AUTO: 72.8 %
NRBC BLD-RTO: 0 /100 WBCS (ref 0–0)
PHOSPHATE SERPL-MCNC: 3.9 MG/DL (ref 2.5–4.5)
PLATELET # BLD AUTO: 213 X10*3/UL (ref 150–450)
POTASSIUM SERPL-SCNC: 4.1 MMOL/L (ref 3.4–5.1)
RBC # BLD AUTO: 4.2 X10*6/UL (ref 4–5.2)
SODIUM SERPL-SCNC: 141 MMOL/L (ref 133–145)
WBC # BLD AUTO: 10.9 X10*3/UL (ref 4.4–11.3)

## 2024-07-30 PROCEDURE — 2060000001 HC INTERMEDIATE ICU ROOM DAILY

## 2024-07-30 PROCEDURE — 83735 ASSAY OF MAGNESIUM: CPT | Performed by: STUDENT IN AN ORGANIZED HEALTH CARE EDUCATION/TRAINING PROGRAM

## 2024-07-30 PROCEDURE — 36415 COLL VENOUS BLD VENIPUNCTURE: CPT | Performed by: STUDENT IN AN ORGANIZED HEALTH CARE EDUCATION/TRAINING PROGRAM

## 2024-07-30 PROCEDURE — 2500000004 HC RX 250 GENERAL PHARMACY W/ HCPCS (ALT 636 FOR OP/ED): Performed by: STUDENT IN AN ORGANIZED HEALTH CARE EDUCATION/TRAINING PROGRAM

## 2024-07-30 PROCEDURE — 97116 GAIT TRAINING THERAPY: CPT | Mod: GP,CQ

## 2024-07-30 PROCEDURE — 2500000001 HC RX 250 WO HCPCS SELF ADMINISTERED DRUGS (ALT 637 FOR MEDICARE OP): Performed by: REGISTERED NURSE

## 2024-07-30 PROCEDURE — 2500000001 HC RX 250 WO HCPCS SELF ADMINISTERED DRUGS (ALT 637 FOR MEDICARE OP): Performed by: STUDENT IN AN ORGANIZED HEALTH CARE EDUCATION/TRAINING PROGRAM

## 2024-07-30 PROCEDURE — 99231 SBSQ HOSP IP/OBS SF/LOW 25: CPT

## 2024-07-30 PROCEDURE — 2500000001 HC RX 250 WO HCPCS SELF ADMINISTERED DRUGS (ALT 637 FOR MEDICARE OP)

## 2024-07-30 PROCEDURE — 84100 ASSAY OF PHOSPHORUS: CPT | Performed by: STUDENT IN AN ORGANIZED HEALTH CARE EDUCATION/TRAINING PROGRAM

## 2024-07-30 PROCEDURE — 2500000001 HC RX 250 WO HCPCS SELF ADMINISTERED DRUGS (ALT 637 FOR MEDICARE OP): Performed by: INTERNAL MEDICINE

## 2024-07-30 PROCEDURE — 2500000002 HC RX 250 W HCPCS SELF ADMINISTERED DRUGS (ALT 637 FOR MEDICARE OP, ALT 636 FOR OP/ED)

## 2024-07-30 PROCEDURE — 97110 THERAPEUTIC EXERCISES: CPT | Mod: GP,CQ

## 2024-07-30 PROCEDURE — 80048 BASIC METABOLIC PNL TOTAL CA: CPT | Performed by: STUDENT IN AN ORGANIZED HEALTH CARE EDUCATION/TRAINING PROGRAM

## 2024-07-30 PROCEDURE — 85025 COMPLETE CBC W/AUTO DIFF WBC: CPT | Performed by: STUDENT IN AN ORGANIZED HEALTH CARE EDUCATION/TRAINING PROGRAM

## 2024-07-30 RX ORDER — CEFAZOLIN SODIUM 2 G/100ML
2 INJECTION, SOLUTION INTRAVENOUS ONCE
Status: COMPLETED | OUTPATIENT
Start: 2024-07-31 | End: 2024-07-31

## 2024-07-30 RX ADMIN — POTASSIUM CHLORIDE 20 MEQ: 1.5 POWDER, FOR SOLUTION ORAL at 17:31

## 2024-07-30 RX ADMIN — METOPROLOL SUCCINATE 75 MG: 50 TABLET, EXTENDED RELEASE ORAL at 20:56

## 2024-07-30 RX ADMIN — ATORVASTATIN CALCIUM 10 MG: 10 TABLET, FILM COATED ORAL at 20:57

## 2024-07-30 RX ADMIN — ENOXAPARIN SODIUM 40 MG: 40 INJECTION SUBCUTANEOUS at 08:13

## 2024-07-30 RX ADMIN — SENNOSIDES AND DOCUSATE SODIUM 1 TABLET: 50; 8.6 TABLET ORAL at 20:57

## 2024-07-30 RX ADMIN — ASPIRIN 81 MG 81 MG: 81 TABLET ORAL at 08:13

## 2024-07-30 RX ADMIN — SENNOSIDES AND DOCUSATE SODIUM 1 TABLET: 50; 8.6 TABLET ORAL at 08:13

## 2024-07-30 RX ADMIN — POTASSIUM CHLORIDE 20 MEQ: 1.5 POWDER, FOR SOLUTION ORAL at 08:13

## 2024-07-30 RX ADMIN — OLANZAPINE 2.5 MG: 5 TABLET, ORALLY DISINTEGRATING ORAL at 20:57

## 2024-07-30 RX ADMIN — ALLOPURINOL 300 MG: 300 TABLET ORAL at 08:13

## 2024-07-30 RX ADMIN — Medication 3 MG: at 20:56

## 2024-07-30 ASSESSMENT — COGNITIVE AND FUNCTIONAL STATUS - GENERAL
MOBILITY SCORE: 13
DRESSING REGULAR LOWER BODY CLOTHING: TOTAL
MOBILITY SCORE: 13
TOILETING: A LOT
MOBILITY SCORE: 13
HELP NEEDED FOR BATHING: A LOT
DRESSING REGULAR UPPER BODY CLOTHING: A LOT
CLIMB 3 TO 5 STEPS WITH RAILING: A LOT
WALKING IN HOSPITAL ROOM: A LOT
TURNING FROM BACK TO SIDE WHILE IN FLAT BAD: A LOT
DAILY ACTIVITIY SCORE: 13
DAILY ACTIVITIY SCORE: 13
PERSONAL GROOMING: A LITTLE
TURNING FROM BACK TO SIDE WHILE IN FLAT BAD: A LOT
MOVING TO AND FROM BED TO CHAIR: A LOT
TURNING FROM BACK TO SIDE WHILE IN FLAT BAD: A LOT
STANDING UP FROM CHAIR USING ARMS: A LOT
TOILETING: A LOT
WALKING IN HOSPITAL ROOM: A LOT
PERSONAL GROOMING: A LITTLE
MOVING TO AND FROM BED TO CHAIR: A LOT
EATING MEALS: A LITTLE
STANDING UP FROM CHAIR USING ARMS: A LOT
MOVING FROM LYING ON BACK TO SITTING ON SIDE OF FLAT BED WITH BEDRAILS: A LITTLE
MOVING FROM LYING ON BACK TO SITTING ON SIDE OF FLAT BED WITH BEDRAILS: A LITTLE
MOVING TO AND FROM BED TO CHAIR: A LOT
HELP NEEDED FOR BATHING: A LOT
STANDING UP FROM CHAIR USING ARMS: A LOT
CLIMB 3 TO 5 STEPS WITH RAILING: A LOT
WALKING IN HOSPITAL ROOM: A LOT
CLIMB 3 TO 5 STEPS WITH RAILING: A LOT
MOVING FROM LYING ON BACK TO SITTING ON SIDE OF FLAT BED WITH BEDRAILS: A LITTLE
EATING MEALS: A LOT
DRESSING REGULAR UPPER BODY CLOTHING: A LOT
DRESSING REGULAR LOWER BODY CLOTHING: A LOT

## 2024-07-30 ASSESSMENT — PAIN - FUNCTIONAL ASSESSMENT
PAIN_FUNCTIONAL_ASSESSMENT: 0-10

## 2024-07-30 ASSESSMENT — ENCOUNTER SYMPTOMS
CONFUSION: 0
ACTIVITY CHANGE: 1
AGITATION: 0
ARTHRALGIAS: 0
MYALGIAS: 0
FATIGUE: 1
HALLUCINATIONS: 0
SLEEP DISTURBANCE: 1
HYPERACTIVE: 0
DECREASED CONCENTRATION: 1
DYSPHORIC MOOD: 0
NERVOUS/ANXIOUS: 1
WEAKNESS: 1

## 2024-07-30 ASSESSMENT — PAIN SCALES - GENERAL
PAINLEVEL_OUTOF10: 0 - NO PAIN

## 2024-07-30 NOTE — PROGRESS NOTES
"Nilam Powell is a 73 y.o. female on day 11 of admission presenting with Cardiac arrest (Multi).    Subjective   Calmer, remembers some family events per        Objective     Physical Exam  Gen: A+O x1   HEENT: Normocephalic/atraumatic pupils equal react light  Neck: No JVD, upstrokes and volumes nl, no bruits   Lung: unchanged   CV:  nl sounding S1, S2, no murmur, PMI nondisplaced  Abdomen: soft, non tender, + BS x 4   Extremities: warm to touch, palpable pulses bilaterally, no edema   Neuro: calmer, answers inaccurately but cooperative  Last Recorded Vitals  Blood pressure 94/68, pulse 85, temperature 36.5 °C (97.7 °F), temperature source Oral, resp. rate 22, height 1.702 m (5' 7\"), weight 91.8 kg (202 lb 6.1 oz), SpO2 97%.  Intake/Output last 3 Shifts:  I/O last 3 completed shifts:  In: 550 (6 mL/kg) [P.O.:550]  Out: 450 (4.9 mL/kg) [Urine:450 (0.1 mL/kg/hr)]  Weight: 91.8 kg     Relevant Results  Results for orders placed or performed during the hospital encounter of 07/19/24 (from the past 24 hour(s))   Basic metabolic panel   Result Value Ref Range    Glucose 101 (H) 65 - 99 mg/dL    Sodium 141 133 - 145 mmol/L    Potassium 4.1 3.4 - 5.1 mmol/L    Chloride 105 97 - 107 mmol/L    Bicarbonate 22 (L) 24 - 31 mmol/L    Urea Nitrogen 21 8 - 25 mg/dL    Creatinine 1.30 0.40 - 1.60 mg/dL    eGFR 44 (L) >60 mL/min/1.73m*2    Calcium 8.8 8.5 - 10.4 mg/dL    Anion Gap 14 <=19 mmol/L   CBC and Auto Differential   Result Value Ref Range    WBC 10.9 4.4 - 11.3 x10*3/uL    nRBC 0.0 0.0 - 0.0 /100 WBCs    RBC 4.20 4.00 - 5.20 x10*6/uL    Hemoglobin 11.6 (L) 12.0 - 16.0 g/dL    Hematocrit 36.4 36.0 - 46.0 %    MCV 87 80 - 100 fL    MCH 27.6 26.0 - 34.0 pg    MCHC 31.9 (L) 32.0 - 36.0 g/dL    RDW 15.9 (H) 11.5 - 14.5 %    Platelets 213 150 - 450 x10*3/uL    Neutrophils % 72.8 40.0 - 80.0 %    Immature Granulocytes %, Automated 1.2 (H) 0.0 - 0.9 %    Lymphocytes % 15.2 13.0 - 44.0 %    Monocytes % 8.4 2.0 - 10.0 %    " Eosinophils % 1.8 0.0 - 6.0 %    Basophils % 0.6 0.0 - 2.0 %    Neutrophils Absolute 7.92 (H) 1.60 - 5.50 x10*3/uL    Immature Granulocytes Absolute, Automated 0.13 0.00 - 0.50 x10*3/uL    Lymphocytes Absolute 1.66 0.80 - 3.00 x10*3/uL    Monocytes Absolute 0.92 (H) 0.05 - 0.80 x10*3/uL    Eosinophils Absolute 0.20 0.00 - 0.40 x10*3/uL    Basophils Absolute 0.06 0.00 - 0.10 x10*3/uL   Magnesium   Result Value Ref Range    Magnesium 2.00 1.60 - 3.10 mg/dL   Phosphorus   Result Value Ref Range    Phosphorus 3.9 2.5 - 4.5 mg/dL            Assessment/Plan   Principal Problem:    Cardiac arrest (Multi)  Active Problems:    Cardiac arrest with ventricular fibrillation (Multi)   Cardiac arrest with ventricular fibrillation (Multi)  Underwent cardiac catheterization without any obstructive lesion requiring revascularization  Preserved left ventricular function  Tolerating beta-blocker, occasional VPB's  Neurology continuing to evaluate, EEG done today to rule out seizures  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  Will continue to follow in terms of placement of ICD for secondary prevention, would like to see mental status and confusion improved with possible  Be best to maintain potassium greater than 4.0, magnesium greater than 2.0  Utilize beta-blocker orally as able  7/29:  Patient's mental status still with confusion oriented to self, poor memory left second to possibly anoxia at the time of admission, discussed with  regarding ICD on Friday at this time do not want any procedure  Has occasional VPB's though now having more intermittent rapid rates will increase beta-blocker to 75 mg twice daily                  7/30  Rhythm better today,  no fast rates after increasing beta-blocker, tolerating,  blood pressure in good range.  No ventricular arrhythmias occasional VPB's.  Patient's  now interested in ICD implantation to protect from recurrent arrhythmia.  Will wait for   to speak with Dr. Goldman and plan appropriately thereafter.   Mentation appears a bit better, psychiatry involved     I spent 30 minutes in the professional and overall care of this patient.      Grace Russ, APRN-CNP

## 2024-07-30 NOTE — CARE PLAN
Nilam overall had a good night. Denied any pain/n/v. She had no issues. Slept through the night. Care transferred to day shift RN.     The patient's goals for the shift include  get a good nights rest.     The clinical goals for the shift include Stay free of falls.      Problem: Knowledge Deficit  Goal: Patient/family/caregiver demonstrates understanding of disease process, treatment plan, medications, and discharge instructions  Outcome: Progressing     Problem: Pain - Adult  Goal: Verbalizes/displays adequate comfort level or baseline comfort level  Outcome: Progressing     Problem: Safety - Adult  Goal: Free from fall injury  Outcome: Progressing     Problem: Discharge Planning  Goal: Discharge to home or other facility with appropriate resources  Outcome: Progressing     Problem: Chronic Conditions and Co-morbidities  Goal: Patient's chronic conditions and co-morbidity symptoms are monitored and maintained or improved  Outcome: Progressing

## 2024-07-30 NOTE — CARE PLAN
Phoned pts alcides cooley for choices ( [phone]); his choices for SNF are #1)[facility] #2) [facility]  Referrals placed in [facility]  Pt will need a precert      DISCHARGE PLAN: SNF--DO NOT DISCHARGE PATIENT BEFORE SPEAKING WITH CARE COORDINATION; NEED ACCEPTING FACILITY; NEED PRECERT; G[facility] AND 7000 MUST BE COMPLETED PRIOR TO DISCHARGE.

## 2024-07-30 NOTE — NURSING NOTE
Assumed care of patient.  Patient in bed resting  Bedside shift report received.  No c/o pain or discomfort at this time  Call light in reach.

## 2024-07-30 NOTE — PROGRESS NOTES
"Nilam Powell is a 73 y.o. female on day 11 of admission presenting with Cardiac arrest (Multi).      Subjective   The patient's chart reviewed and discussed with nursing.   During today's encounter, the patient appeared alert and awake. She was aware of her date of birth and recognized that she is in the hospital, although she mistakenly believes it is 1970. Despite this confusion, the patient expressed that she feels safe and could not identify any reasons for feeling otherwise. When discussing her return home, she confidently stated, \"Of course I feel safe,\" although she was uncertain if anyone would be home. We reassured her that her  is at home and that one of her sons will be looking after her. Although initially skeptical, she responded affirmatively.  The patient denied feeling depressed or anxious today and did not exhibit any signs of hallucinations. She was taken aback when asked about thoughts of self-harm or wishing she were dead, questioning why we would think that. From a psychiatric perspective, the patient has shown notable improvement, particularly with her somnolence after cutting Zyprexa to 2.5 mg at bedtime, and her mood remains appropriate and without a need for a sitter for the past 2 days  We appreciate being involved in the patient's case and will sign off for now. Please feel free to reconsult if necessary.        Objective     Last Recorded Vitals  Blood pressure 94/68, pulse 61, temperature 35 °C (95 °F), temperature source Temporal, resp. rate 20, height 1.702 m (5' 7\"), weight 91.8 kg (202 lb 6.1 oz), SpO2 90%.    Review of Systems   Constitutional:  Positive for activity change and fatigue.   Musculoskeletal:  Negative for arthralgias and myalgias.   Neurological:  Positive for weakness.   Psychiatric/Behavioral:  Positive for decreased concentration and sleep disturbance. Negative for agitation, behavioral problems, confusion, dysphoric mood, hallucinations, self-injury and " suicidal ideas. The patient is nervous/anxious. The patient is not hyperactive.        Psychiatric ROS - Adult  Anxiety: decreased anxiety  Depression: concentration and energy no depression  Delirium: negative  Psychosis: negative  Kathy: negative  Safety Issues: none  Psychiatric ROS Comment: As noted      Mental Status Exam  General: alert and awake in hospital attire     Appearance: Fairly groomed.  Attitude/Behavior:Cooperative, conversant, engaged, and with good eye contact.  Motor Activity: No psychomotor agitation or retardation, no tremor or other abnormal movements.  Speech: normal rate, tone and rhythm and soft  Mood: euthymic  Affect: mood-congruent and redirectable  Thought Process: linear, goal directed and circumstantial  Thought Content: Within normal limits  Thought Perception: No perceptual abnormalities noted  Cognition: Cognitively intact to conversational testing with respect to attention, orientation, fund of knowledge, recent and remote memory, and language.  Insight: fair  Judgement: Fair    Psychiatric Risk Assessment  Violence Risk Assessment: other Delirium   Acute Risk of Harm to Others is Considered: low and moderate   Suicide Risk Assessment: age > 65 yrs old  Protective Factors against Suicide: adherence to  treatment, moral objections to suicide, and positive family relationships  Acute Risk of Harm to Self is Considered: low    Current Medications    Scheduled medications  allopurinol, 300 mg, oral, Daily  amiodarone, 200 mg, oral, Daily  amLODIPine, 5 mg, oral, Daily  aspirin, 81 mg, oral, Daily  atorvastatin, 10 mg, oral, Nightly  enoxaparin, 40 mg, subcutaneous, Daily  melatonin, 3 mg, oral, Daily  metoprolol succinate XL, 75 mg, oral, BID  OLANZapine zydis, 2.5 mg, oral, Nightly  oxygen, , inhalation, Continuous - 02/gases  polyethylene glycol, 17 g, oral, Daily  potassium chloride, 20 mEq, oral, BID  sennosides-docusate sodium, 1 tablet, oral, BID      Continuous medications      PRN medications  PRN medications: acetaminophen, bisacodyl, dextrose, dextrose, glucagon, glucagon, hydrOXYzine HCL, OLANZapine, OLANZapine, oxygen       Medication efficacy: yes mood improved  Patient reporting any side effects? No  Any observed side effects? No      Relevant Results  Results for orders placed or performed during the hospital encounter of 07/19/24 (from the past 24 hour(s))   Basic metabolic panel   Result Value Ref Range    Glucose 101 (H) 65 - 99 mg/dL    Sodium 141 133 - 145 mmol/L    Potassium 4.1 3.4 - 5.1 mmol/L    Chloride 105 97 - 107 mmol/L    Bicarbonate 22 (L) 24 - 31 mmol/L    Urea Nitrogen 21 8 - 25 mg/dL    Creatinine 1.30 0.40 - 1.60 mg/dL    eGFR 44 (L) >60 mL/min/1.73m*2    Calcium 8.8 8.5 - 10.4 mg/dL    Anion Gap 14 <=19 mmol/L   CBC and Auto Differential   Result Value Ref Range    WBC 10.9 4.4 - 11.3 x10*3/uL    nRBC 0.0 0.0 - 0.0 /100 WBCs    RBC 4.20 4.00 - 5.20 x10*6/uL    Hemoglobin 11.6 (L) 12.0 - 16.0 g/dL    Hematocrit 36.4 36.0 - 46.0 %    MCV 87 80 - 100 fL    MCH 27.6 26.0 - 34.0 pg    MCHC 31.9 (L) 32.0 - 36.0 g/dL    RDW 15.9 (H) 11.5 - 14.5 %    Platelets 213 150 - 450 x10*3/uL    Neutrophils % 72.8 40.0 - 80.0 %    Immature Granulocytes %, Automated 1.2 (H) 0.0 - 0.9 %    Lymphocytes % 15.2 13.0 - 44.0 %    Monocytes % 8.4 2.0 - 10.0 %    Eosinophils % 1.8 0.0 - 6.0 %    Basophils % 0.6 0.0 - 2.0 %    Neutrophils Absolute 7.92 (H) 1.60 - 5.50 x10*3/uL    Immature Granulocytes Absolute, Automated 0.13 0.00 - 0.50 x10*3/uL    Lymphocytes Absolute 1.66 0.80 - 3.00 x10*3/uL    Monocytes Absolute 0.92 (H) 0.05 - 0.80 x10*3/uL    Eosinophils Absolute 0.20 0.00 - 0.40 x10*3/uL    Basophils Absolute 0.06 0.00 - 0.10 x10*3/uL   Magnesium   Result Value Ref Range    Magnesium 2.00 1.60 - 3.10 mg/dL   Phosphorus   Result Value Ref Range    Phosphorus 3.9 2.5 - 4.5 mg/dL               Reviewed    Assessment/Plan   Principal Problem:    Cardiac arrest (Multi)  Active  Problems:    Cardiac arrest with ventricular fibrillation (Multi)    Plan/Recommendations       1) Agitation  Likely due to delirium superimposed on underlying cognitive impairment.      Plan:    - Continue Zyprexa 2.5 mg PO at bedtime for mood stabilization  - Can give olanzapine 2.5 mg PO/ 2.5mg IM Q6H PRN for agitation  - Continue melatonin 3 mg at 1800 to aid sleep-wake cycle  - Continue Hydroxyzine 25mg Q6H PRN for anxiety, restlessness     Would recommend the use of delirium precautions with this patient:   -- Minimize use of benzos, opiates, anticholinergics, as these may worsen mental status   -- Would use caution with narcotic pain medications   -- Would still adequately controlling pain, as uncontrolled pain is also a risk factor for delirium   -- Reinforce sleep hygiene; encourage patient to stay awake during the day   -- Keep curtains/blinds open during the day and closed at night.   -- Would recommend reorienting/redirecting patient as much as possible,    -- Aim for consistent staffing, familiar objects, avoiding bright lights and loud noises, etc.    The patient does not meet the criteria for the inpatient psychiatric treatment. Appreciate Discharge Dispo or next steps from the Care Coordinator. Thank you for allowing us to participate in the care of this patient.      Medication Consent  Medication Consent: risks, benefits, side effects reviewed for all ordered meds           I personally spent 25 minutes today providing care for this patient, including preparation, face to face time, documentation and other services such as review of medical records, diagnostic result, patient education, counseling, coordination of care as specified in the encounter. Discussed side effects with opportunity to ask questions and questions answered.  Patient given consent to the plan as noted.     Parts of this chart have been completed using voice recognition software.  Please excuse any errors of transcription.   Despite the medical decision making time stamp, my medical decision making has taken place during the patient's entire visit.  Thought process and reason for plan has been formulated from the time that I saw the patient until the time of disposition and is not specific to one specific moment during their visit and furthermore the medical decision making encompasses the entire chart and not only that represented in this note.     Diana Kessler, APRN-CNP

## 2024-07-30 NOTE — PROGRESS NOTES
Occupational Therapy                 Therapy Communication Note    Patient Name: Nilam Powell  MRN: [id number]  Today's Date: 7/30/2024     Discipline: Occupational Therapy    Missed Visit Reason: Missed Visit Reason: Other (Comment) (Attempted to see patient for OT, patient eating, will attempt later.)    Missed Time: Attempt    Comment:

## 2024-07-30 NOTE — PROGRESS NOTES
Physical Therapy    Physical Therapy Treatment    Patient Name: Nilam Powell  MRN: [id number]  Today's Date: 7/30/2024  Time Calculation  Start Time: 0853  Stop Time: 0916  Time Calculation (min): 23 min    Assessment/Plan   PT Assessment  End of Session Communication: Bedside nurse  End of Session Patient Position: Up in chair, Alarm on  PT Plan  Inpatient/Swing Bed or Outpatient: Inpatient  PT Plan  Treatment/Interventions: Bed mobility, Transfer training, Gait training, Stair training, Balance training, Neuromuscular re-education, Strengthening, Endurance training, Range of motion, Therapeutic exercise, Therapeutic activity  PT Plan: Ongoing PT  PT Frequency: 6 times per week  PT Discharge Recommendations: Moderate intensity level of continued care  Equipment Recommended upon Discharge: Wheeled walker  PT Recommended Transfer Status: Assist x2  PT - OK to Discharge: Yes      General Visit Information:   PT  Visit  PT Received On: 07/30/24  General  Prior to Session Communication: Bedside nurse  Patient Position Received: Bed, 3 rail up, Alarm on  General Comment: Cleared by nursing to be seen for therapy, pt agreeable with tx, supine in bed upon arrival.    Subjective        Objective   Pain:  Pain Assessment  Pain Assessment: 0-10  0-10 (Numeric) Pain Score: 0 - No pain    Cognition:  Cognition  Overall Cognitive Status: Impaired  Following Commands: Follows one step commands with repetition       Postural Control:  Static Sitting Balance  Static Sitting-Balance Support: Bilateral upper extremity supported, Feet supported  Static Sitting-Level of Assistance: Close supervision  Static Sitting-Comment/Number of Minutes: Fair seated static balance x5 mintues  Static Standing Balance  Static Standing-Balance Support: Bilateral upper extremity supported  Static Standing-Level of Assistance: Moderate assistance  Static Standing-Comment/Number of Minutes: Poor static standing balance with bilateral UE support on  walker.    Treatments:  Therapeutic Exercise  Therapeutic Exercise Performed: Yes  Therapeutic Exercise Activity 1: Bilateral ankle pumps x15  Therapeutic Exercise Activity 2: Bilateral hip flexion x15  Therapeutic Exercise Activity 3: Bilateral knee extension x15  Therapeutic Exercise Activity 4: Resisted hip abd/add 15    Bed Mobility  Bed Mobility: Yes  Bed Mobility 1  Bed Mobility 1: Supine to sitting  Level of Assistance 1: Minimum assistance  Bed Mobility Comments 1: Min assist for trunk during supine to sit, pt able to bring LE's off EOB with increased time and effort.    Ambulation/Gait Training  Ambulation/Gait Training Performed: Yes  Ambulation/Gait Training 1  Surface 1: Level tile  Device 1: Rolling walker  Assistance 1: Moderate assistance  Quality of Gait 1: Wide base of support, Decreased step length, Diminished heel strike, Forward flexed posture  Comments/Distance (ft) 1: 5-6 steps (bed to chair) with wheeled walker, requires cues for upright posture, cues to remain in NICK of walker, pt attempting to sit early into chair, mod assist for balance.    Transfers  Transfer: Yes  Transfer 1  Transfer From 1: Sit to  Transfer to 1: Stand  Technique 1: Sit to stand, Stand to sit  Transfer Level of Assistance 1: Moderate assistance  Trials/Comments 1: Mod assist for trunk up during sit to stand, pt pulling up from walker to stand, decreased eccentric control in sitting.    Outcome Measures:  [facility] Basic Mobility  Turning from your back to your side while in a flat bed without using bedrails: A little  Moving from lying on your back to sitting on the side of a flat bed without using bedrails: A lot  Moving to and from bed to chair (including a wheelchair): A lot  Standing up from a chair using your arms (e.g. wheelchair or bedside chair): A lot  To walk in hospital room: A lot  Climbing 3-5 steps with railing: A lot  Basic Mobility - Total Score: 13    Encounter Problems       Encounter Problems (Active)        Balance       STG - Maintains static standing balance with upper extremity support and min assist x 1. (Progressing)       Start:  07/22/24    Expected End:  08/22/24       INTERVENTIONS:  1. Practice standing with minimal support.  2. Educate patient about standing tolerance.  3. Educate patient about independence with gait, transfers, and ADL's.  4. Educate patient about use of assistive device.  5. Educate patient about self-directed care.         STG - Maintains static sitting balance with upper extremity support and supervision for safety. (Progressing)       Start:  07/22/24    Expected End:  08/22/24       INTERVENTIONS:  1. Practice sitting on the edge of a bed/mat with minimal support.  2. Educate patient about maintining total hip precautions while maintaining balance.  3. Educate patient about pressure relief.  4. Educate patient about use of assistive device.            Mobility       STG - Patient will ambulate 50' with use of rolling walker and min assist x 1. (Progressing)       Start:  07/22/24    Expected End:  08/22/24            STG - Patient will ambulate up and down a curb/step with min assist x 1. (Not Progressing)       Start:  07/22/24    Expected End:  08/22/24               PT Transfers       STG - Patient to transfer to and from sit to supine with supervision for safety. (Progressing)       Start:  07/22/24    Expected End:  08/22/24            STG - Patient will transfer sit to and from stand with use of rolling walker and min assist x 1. (Progressing)       Start:  07/22/24    Expected End:  08/22/24               Pain - Adult          Safety       STG - Patient uses call light consistently to request assistance with transfers (Progressing)       Start:  07/22/24    Expected End:  08/22/24

## 2024-07-31 LAB
ANION GAP SERPL CALC-SCNC: 11 MMOL/L
ANION GAP SERPL CALC-SCNC: 13 MMOL/L
BASOPHILS # BLD AUTO: 0.06 X10*3/UL (ref 0–0.1)
BASOPHILS NFR BLD AUTO: 0.5 %
BUN SERPL-MCNC: 34 MG/DL (ref 8–25)
BUN SERPL-MCNC: 34 MG/DL (ref 8–25)
CALCIUM SERPL-MCNC: 8.6 MG/DL (ref 8.5–10.4)
CALCIUM SERPL-MCNC: 8.9 MG/DL (ref 8.5–10.4)
CHLORIDE SERPL-SCNC: 105 MMOL/L (ref 97–107)
CHLORIDE SERPL-SCNC: 105 MMOL/L (ref 97–107)
CO2 SERPL-SCNC: 23 MMOL/L (ref 24–31)
CO2 SERPL-SCNC: 23 MMOL/L (ref 24–31)
CREAT SERPL-MCNC: 2 MG/DL (ref 0.4–1.6)
CREAT SERPL-MCNC: 2 MG/DL (ref 0.4–1.6)
EGFRCR SERPLBLD CKD-EPI 2021: 26 ML/MIN/1.73M*2
EGFRCR SERPLBLD CKD-EPI 2021: 26 ML/MIN/1.73M*2
EOSINOPHIL # BLD AUTO: 0.28 X10*3/UL (ref 0–0.4)
EOSINOPHIL NFR BLD AUTO: 2.4 %
ERYTHROCYTE [DISTWIDTH] IN BLOOD BY AUTOMATED COUNT: 16.1 % (ref 11.5–14.5)
GLUCOSE SERPL-MCNC: 101 MG/DL (ref 65–99)
GLUCOSE SERPL-MCNC: 105 MG/DL (ref 65–99)
HCT VFR BLD AUTO: 36.5 % (ref 36–46)
HGB BLD-MCNC: 11.2 G/DL (ref 12–16)
IMM GRANULOCYTES # BLD AUTO: 0.09 X10*3/UL (ref 0–0.5)
IMM GRANULOCYTES NFR BLD AUTO: 0.8 % (ref 0–0.9)
LYMPHOCYTES # BLD AUTO: 2.01 X10*3/UL (ref 0.8–3)
LYMPHOCYTES NFR BLD AUTO: 17.4 %
MAGNESIUM SERPL-MCNC: 2.4 MG/DL (ref 1.6–3.1)
MCH RBC QN AUTO: 27.2 PG (ref 26–34)
MCHC RBC AUTO-ENTMCNC: 30.7 G/DL (ref 32–36)
MCV RBC AUTO: 89 FL (ref 80–100)
MONOCYTES # BLD AUTO: 0.96 X10*3/UL (ref 0.05–0.8)
MONOCYTES NFR BLD AUTO: 8.3 %
NEUTROPHILS # BLD AUTO: 8.13 X10*3/UL (ref 1.6–5.5)
NEUTROPHILS NFR BLD AUTO: 70.6 %
NRBC BLD-RTO: 0 /100 WBCS (ref 0–0)
PHOSPHATE SERPL-MCNC: 4.4 MG/DL (ref 2.5–4.5)
PLATELET # BLD AUTO: 212 X10*3/UL (ref 150–450)
POTASSIUM SERPL-SCNC: 4.5 MMOL/L (ref 3.4–5.1)
POTASSIUM SERPL-SCNC: 4.8 MMOL/L (ref 3.4–5.1)
RBC # BLD AUTO: 4.12 X10*6/UL (ref 4–5.2)
SODIUM SERPL-SCNC: 139 MMOL/L (ref 133–145)
SODIUM SERPL-SCNC: 141 MMOL/L (ref 133–145)
WBC # BLD AUTO: 11.5 X10*3/UL (ref 4.4–11.3)

## 2024-07-31 PROCEDURE — 2500000004 HC RX 250 GENERAL PHARMACY W/ HCPCS (ALT 636 FOR OP/ED): Performed by: INTERNAL MEDICINE

## 2024-07-31 PROCEDURE — 84100 ASSAY OF PHOSPHORUS: CPT | Performed by: STUDENT IN AN ORGANIZED HEALTH CARE EDUCATION/TRAINING PROGRAM

## 2024-07-31 PROCEDURE — C1777 LEAD, AICD, ENDO SINGLE COIL: HCPCS | Performed by: INTERNAL MEDICINE

## 2024-07-31 PROCEDURE — C1892 INTRO/SHEATH,FIXED,PEEL-AWAY: HCPCS | Performed by: INTERNAL MEDICINE

## 2024-07-31 PROCEDURE — 2500000002 HC RX 250 W HCPCS SELF ADMINISTERED DRUGS (ALT 637 FOR MEDICARE OP, ALT 636 FOR OP/ED): Performed by: INTERNAL MEDICINE

## 2024-07-31 PROCEDURE — 02H63KZ INSERTION OF DEFIBRILLATOR LEAD INTO RIGHT ATRIUM, PERCUTANEOUS APPROACH: ICD-10-PCS | Performed by: INTERNAL MEDICINE

## 2024-07-31 PROCEDURE — 99232 SBSQ HOSP IP/OBS MODERATE 35: CPT | Performed by: INTERNAL MEDICINE

## 2024-07-31 PROCEDURE — C1889 IMPLANT/INSERT DEVICE, NOC: HCPCS | Performed by: INTERNAL MEDICINE

## 2024-07-31 PROCEDURE — 33249 INSJ/RPLCMT DEFIB W/LEAD(S): CPT | Performed by: INTERNAL MEDICINE

## 2024-07-31 PROCEDURE — 99152 MOD SED SAME PHYS/QHP 5/>YRS: CPT | Performed by: INTERNAL MEDICINE

## 2024-07-31 PROCEDURE — 99153 MOD SED SAME PHYS/QHP EA: CPT | Performed by: INTERNAL MEDICINE

## 2024-07-31 PROCEDURE — 2500000001 HC RX 250 WO HCPCS SELF ADMINISTERED DRUGS (ALT 637 FOR MEDICARE OP): Performed by: INTERNAL MEDICINE

## 2024-07-31 PROCEDURE — C1900 LEAD, CORONARY VENOUS: HCPCS | Performed by: INTERNAL MEDICINE

## 2024-07-31 PROCEDURE — 97535 SELF CARE MNGMENT TRAINING: CPT | Mod: GO,CO

## 2024-07-31 PROCEDURE — 83735 ASSAY OF MAGNESIUM: CPT | Performed by: STUDENT IN AN ORGANIZED HEALTH CARE EDUCATION/TRAINING PROGRAM

## 2024-07-31 PROCEDURE — 2720000007 HC OR 272 NO HCPCS: Performed by: INTERNAL MEDICINE

## 2024-07-31 PROCEDURE — 97530 THERAPEUTIC ACTIVITIES: CPT | Mod: GP

## 2024-07-31 PROCEDURE — 02HK3KZ INSERTION OF DEFIBRILLATOR LEAD INTO RIGHT VENTRICLE, PERCUTANEOUS APPROACH: ICD-10-PCS | Performed by: INTERNAL MEDICINE

## 2024-07-31 PROCEDURE — 80048 BASIC METABOLIC PNL TOTAL CA: CPT | Performed by: STUDENT IN AN ORGANIZED HEALTH CARE EDUCATION/TRAINING PROGRAM

## 2024-07-31 PROCEDURE — 85025 COMPLETE CBC W/AUTO DIFF WBC: CPT | Performed by: STUDENT IN AN ORGANIZED HEALTH CARE EDUCATION/TRAINING PROGRAM

## 2024-07-31 PROCEDURE — 2500000001 HC RX 250 WO HCPCS SELF ADMINISTERED DRUGS (ALT 637 FOR MEDICARE OP): Performed by: REGISTERED NURSE

## 2024-07-31 PROCEDURE — 82374 ASSAY BLOOD CARBON DIOXIDE: CPT | Performed by: STUDENT IN AN ORGANIZED HEALTH CARE EDUCATION/TRAINING PROGRAM

## 2024-07-31 PROCEDURE — 2750000001 HC OR 275 NO HCPCS: Performed by: INTERNAL MEDICINE

## 2024-07-31 PROCEDURE — 2500000004 HC RX 250 GENERAL PHARMACY W/ HCPCS (ALT 636 FOR OP/ED): Mod: JZ | Performed by: REGISTERED NURSE

## 2024-07-31 PROCEDURE — 2780000003 HC OR 278 NO HCPCS: Performed by: INTERNAL MEDICINE

## 2024-07-31 PROCEDURE — C1894 INTRO/SHEATH, NON-LASER: HCPCS | Performed by: INTERNAL MEDICINE

## 2024-07-31 PROCEDURE — 36415 COLL VENOUS BLD VENIPUNCTURE: CPT | Performed by: STUDENT IN AN ORGANIZED HEALTH CARE EDUCATION/TRAINING PROGRAM

## 2024-07-31 PROCEDURE — 97110 THERAPEUTIC EXERCISES: CPT | Mod: GO,CO

## 2024-07-31 PROCEDURE — C1721 AICD, DUAL CHAMBER: HCPCS | Performed by: INTERNAL MEDICINE

## 2024-07-31 PROCEDURE — 3E0102A INTRODUCTION OF ANTI-INFECTIVE ENVELOPE INTO SUBCUTANEOUS TISSUE, OPEN APPROACH: ICD-10-PCS | Performed by: INTERNAL MEDICINE

## 2024-07-31 PROCEDURE — 2060000001 HC INTERMEDIATE ICU ROOM DAILY

## 2024-07-31 PROCEDURE — 2500000001 HC RX 250 WO HCPCS SELF ADMINISTERED DRUGS (ALT 637 FOR MEDICARE OP): Performed by: STUDENT IN AN ORGANIZED HEALTH CARE EDUCATION/TRAINING PROGRAM

## 2024-07-31 PROCEDURE — 0JH608Z INSERTION OF DEFIBRILLATOR GENERATOR INTO CHEST SUBCUTANEOUS TISSUE AND FASCIA, OPEN APPROACH: ICD-10-PCS | Performed by: INTERNAL MEDICINE

## 2024-07-31 DEVICE — INTEGRATED BIPOLAR PACE/SENSE AND DEFIBRILLATION LEAD
Type: IMPLANTABLE DEVICE | Site: HEART | Status: FUNCTIONAL
Brand: RELIANCE 4-FRONT™

## 2024-07-31 DEVICE — IMPLANTABLE CARDIOVERTER DEFIBRILLATOR DR
Type: IMPLANTABLE DEVICE | Site: CHEST | Status: FUNCTIONAL
Brand: VIGILANT™ EL ICD DR

## 2024-07-31 DEVICE — LEAD, PACING, INGEVITY PLUS, 52 CM: Type: IMPLANTABLE DEVICE | Site: HEART | Status: FUNCTIONAL

## 2024-07-31 RX ORDER — MIDAZOLAM HYDROCHLORIDE 1 MG/ML
INJECTION, SOLUTION INTRAMUSCULAR; INTRAVENOUS AS NEEDED
Status: DISCONTINUED | OUTPATIENT
Start: 2024-07-31 | End: 2024-07-31 | Stop reason: HOSPADM

## 2024-07-31 RX ORDER — SODIUM CHLORIDE 9 MG/ML
75 INJECTION, SOLUTION INTRAVENOUS CONTINUOUS
Status: DISCONTINUED | OUTPATIENT
Start: 2024-07-31 | End: 2024-08-02 | Stop reason: HOSPADM

## 2024-07-31 RX ORDER — BUPIVACAINE HYDROCHLORIDE 2.5 MG/ML
INJECTION, SOLUTION EPIDURAL; INFILTRATION; INTRACAUDAL AS NEEDED
Status: DISCONTINUED | OUTPATIENT
Start: 2024-07-31 | End: 2024-07-31 | Stop reason: HOSPADM

## 2024-07-31 RX ORDER — FENTANYL CITRATE 50 UG/ML
INJECTION, SOLUTION INTRAMUSCULAR; INTRAVENOUS AS NEEDED
Status: DISCONTINUED | OUTPATIENT
Start: 2024-07-31 | End: 2024-07-31 | Stop reason: HOSPADM

## 2024-07-31 RX ADMIN — SENNOSIDES AND DOCUSATE SODIUM 1 TABLET: 50; 8.6 TABLET ORAL at 20:08

## 2024-07-31 RX ADMIN — ATORVASTATIN CALCIUM 10 MG: 10 TABLET, FILM COATED ORAL at 20:08

## 2024-07-31 RX ADMIN — Medication 3 MG: at 20:07

## 2024-07-31 RX ADMIN — OLANZAPINE 2.5 MG: 2.5 TABLET, FILM COATED ORAL at 20:07

## 2024-07-31 RX ADMIN — OLANZAPINE 2.5 MG: 5 TABLET, ORALLY DISINTEGRATING ORAL at 21:00

## 2024-07-31 RX ADMIN — ASPIRIN 81 MG 81 MG: 81 TABLET ORAL at 08:32

## 2024-07-31 RX ADMIN — ACETAMINOPHEN 650 MG: 325 TABLET ORAL at 08:31

## 2024-07-31 RX ADMIN — METOPROLOL SUCCINATE 75 MG: 50 TABLET, EXTENDED RELEASE ORAL at 20:08

## 2024-07-31 RX ADMIN — SODIUM CHLORIDE 100 ML/HR: 900 INJECTION, SOLUTION INTRAVENOUS at 11:55

## 2024-07-31 RX ADMIN — SENNOSIDES AND DOCUSATE SODIUM 1 TABLET: 50; 8.6 TABLET ORAL at 08:32

## 2024-07-31 RX ADMIN — METOPROLOL SUCCINATE 75 MG: 50 TABLET, EXTENDED RELEASE ORAL at 08:32

## 2024-07-31 ASSESSMENT — PAIN SCALES - GENERAL
PAINLEVEL_OUTOF10: 7
PAINLEVEL_OUTOF10: 0 - NO PAIN

## 2024-07-31 ASSESSMENT — COGNITIVE AND FUNCTIONAL STATUS - GENERAL
DRESSING REGULAR UPPER BODY CLOTHING: A LITTLE
CLIMB 3 TO 5 STEPS WITH RAILING: TOTAL
DAILY ACTIVITIY SCORE: 15
EATING MEALS: A LITTLE
TURNING FROM BACK TO SIDE WHILE IN FLAT BAD: A LOT
DRESSING REGULAR LOWER BODY CLOTHING: A LOT
EATING MEALS: A LITTLE
TOILETING: A LOT
MOBILITY SCORE: 13
HELP NEEDED FOR BATHING: A LOT
TURNING FROM BACK TO SIDE WHILE IN FLAT BAD: A LOT
MOVING FROM LYING ON BACK TO SITTING ON SIDE OF FLAT BED WITH BEDRAILS: A LOT
MOBILITY SCORE: 13
STANDING UP FROM CHAIR USING ARMS: A LITTLE
PERSONAL GROOMING: A LITTLE
MOVING TO AND FROM BED TO CHAIR: A LOT
TOILETING: A LOT
DRESSING REGULAR LOWER BODY CLOTHING: A LOT
DAILY ACTIVITIY SCORE: 15
HELP NEEDED FOR BATHING: A LOT
WALKING IN HOSPITAL ROOM: A LITTLE
CLIMB 3 TO 5 STEPS WITH RAILING: TOTAL
STANDING UP FROM CHAIR USING ARMS: A LITTLE
WALKING IN HOSPITAL ROOM: A LITTLE
MOVING TO AND FROM BED TO CHAIR: A LOT
MOVING FROM LYING ON BACK TO SITTING ON SIDE OF FLAT BED WITH BEDRAILS: A LOT
DRESSING REGULAR UPPER BODY CLOTHING: A LITTLE
PERSONAL GROOMING: A LITTLE

## 2024-07-31 ASSESSMENT — PAIN DESCRIPTION - LOCATION: LOCATION: KNEE

## 2024-07-31 ASSESSMENT — PAIN - FUNCTIONAL ASSESSMENT
PAIN_FUNCTIONAL_ASSESSMENT: 0-10

## 2024-07-31 ASSESSMENT — ACTIVITIES OF DAILY LIVING (ADL): HOME_MANAGEMENT_TIME_ENTRY: 15

## 2024-07-31 ASSESSMENT — PAIN DESCRIPTION - ORIENTATION: ORIENTATION: RIGHT;LEFT

## 2024-07-31 NOTE — PROGRESS NOTES
Occupational Therapy    OT Treatment    Patient Name: Nilam Powell  MRN: [id number]  Today's Date: 7/31/2024  Time Calculation  Start Time: 1020  Stop Time: 1050  Time Calculation (min): 30 min        Assessment:  OT Assessment: Pt activley participating/progressing with established POC.  Will continue to address remaining deficits with skilled therapeutic intervention  Prognosis: Good  Barriers to Discharge: Inaccessible home environment, Decreased caregiver support  Evaluation/Treatment Tolerance: Patient tolerated treatment well  Medical Staff Made Aware: Yes  End of Session Communication: Bedside nurse  End of Session Patient Position: Up in chair, Alarm on (call light in reach Pt demonstrate use all needs met)  OT Assessment Results: Decreased ADL status, Decreased cognition, Decreased endurance, Decreased safe judgment during ADL, Decreased functional mobility, Decreased trunk control for functional activities  Prognosis: Good  Barriers to Discharge: Inaccessible home environment, Decreased caregiver support  Evaluation/Treatment Tolerance: Patient tolerated treatment well  Medical Staff Made Aware: Yes  Strengths: Ability to acquire knowledge, Support of Caregivers  Barriers to Participation: Comorbidities  Plan:  Treatment Interventions: ADL retraining, Functional transfer training, UE strengthening/ROM, Endurance training, Patient/family training, Equipment evaluation/education, Compensatory technique education  OT Frequency: 4 times per week  OT Discharge Recommendations: Moderate intensity level of continued care  Equipment Recommended upon Discharge: Wheeled walker  OT Recommended Transfer Status: Assist of 1, Assist of 2  OT - OK to Discharge: Yes  Treatment Interventions: ADL retraining, Functional transfer training, UE strengthening/ROM, Endurance training, Patient/family training, Equipment evaluation/education, Compensatory technique education    Subjective   Previous Visit Info:  OT Last Visit  OT  Received On: 07/31/24  General:  General  Reason for Referral: impaired ADLs s/p cardiac arrest requiring intubation/ extubation, AMS  Referred By: Demetrice Mendoza PA-C  Past Medical History Relevant to Rehab: HTN, DLP, gout, uterine fibroid, Colonoscopy, hysterectomy  Prior to Session Communication: Bedside nurse  Patient Position Received: Bed, 3 rail up, Alarm on  Preferred Learning Style: verbal, visual  General Comment: Cleared by NSG Pt agreeable to skilled OT intervention supine upon entrance to room.  Precautions:  Hearing/Visual Limitations: Glasses  Medical Precautions: Fall precautions  Precautions Comment: purewick, telemetry  Pain:  Pain Assessment  Pain Assessment: 0-10  0-10 (Numeric) Pain Score: 0 - No pain    Objective    Cognition:  Cognition  Overall Cognitive Status: Impaired  Orientation Level: Disoriented to place  Following Commands: Follows one step commands with repetition  Safety Judgment: Decreased awareness of need for assistance  Processing Speed: Delayed  Coordination:  Movements are Fluid and Coordinated: No  Lower Body Coordination: slower rate of movements  Coordination Comment: decreased rate/accuracy of movements  Activities of Daily Living:      Grooming  Grooming Level of Assistance: Setup  Grooming Where Assessed: Edge of bed  Grooming Comments: Pt brushing teeth, washing hands    LE Dressing  LE Dressing: Yes  LE Dressing Adaptive Equipment: Reacher, Sock aide  Sock Level of Assistance: Contact guard  LE Dressing Where Assessed: Edge of bed  LE Dressing Comments: Pt doff/don socks vc technique and to avoid over reaching    Toileting  Toileting Adaptive Equipment: Cathertization equipment (+Purewick)  Toileting Level of Assistance: Dependent  Where Assessed: Bed level  Functional Standing Tolerance:  Time: 2 minutes  Activity: transfer  Functional Standing Tolerance Comments: BUE support RW   gait belt use  Bed Mobility/Transfers: Bed Mobility  Bed Mobility: Yes  Bed Mobility  1  Bed Mobility 1: Supine to sitting  Level of Assistance 1: Minimum assistance  Bed Mobility Comments 1: bed to neutral use side transfer bar assist trunk up  Bed Mobility 2  Bed Mobility  2: Scooting  Level of Assistance 2: Close supervision  Bed Mobility Comments 2: seated to edge of bed    Transfers  Transfer: Yes  Transfer 1  Transfer From 1: Bed to  Transfer to 1: Stand, Chair with arms  Technique 1: Sit to stand, Stand to sit  Transfer Device 1: Walker  Transfer Level of Assistance 1: Moderate assistance  Trials/Comments 1: gait belt use vc hand placement    Sitting Balance:  Dynamic Sitting Balance  Dynamic Sitting-Balance Support: Feet supported  Dynamic Sitting-Balance: Forward lean, Reaching for objects, Reaching across midline, Trunk control activities  Dynamic Sitting-Comments: Fair sitting balance with ADL skills    Therapy/Activity: Therapeutic Exercise  Therapeutic Exercise Performed: Yes (BUE seated edge of bed, use yellow t band instructed/handout issuance with good teach back 15 reps x 1 set  to include proper pacing, joint alignment and counting reps aloud)  Therapeutic Exercise Activity 1: shoulder flexion/extension  Therapeutic Exercise Activity 2: elbow flexion/extension  Therapeutic Exercise Activity 3: triceps presses  Therapeutic Exercise Activity 4: shoulder abduction/adduction    Outcome Measures:[facility] Health Daily Activity  Putting on and taking off regular lower body clothing: A lot  Bathing (including washing, rinsing, drying): A lot  Putting on and taking off regular upper body clothing: A little  Toileting, which includes using toilet, bedpan or urinal: A lot  Taking care of personal grooming such as brushing teeth: A little  Eating Meals: A little  Daily Activity - Total Score: 15    Education Documentation  Body Mechanics, taught by LINN Rodrigez at 7/31/2024 11:03 AM.  Learner: Patient  Readiness: Acceptance  Method: Explanation, Demonstration, Handout, Teach-back  Response:  Verbalizes Understanding, Demonstrated Understanding, Needs Reinforcement  Comment: Instructed Pt with safety in transfers, adaptive ADL skills, balance strategies    ADL Training, taught by LINN Rodrigez at 7/31/2024 11:03 AM.  Learner: Patient  Readiness: Acceptance  Method: Explanation, Demonstration, Handout, Teach-back  Response: Verbalizes Understanding, Demonstrated Understanding, Needs Reinforcement  Comment: Instructed Pt with safety in transfers, adaptive ADL skills, balance strategies    Education Comments  No comments found.      OP EDUCATION:       Goals:  Encounter Problems       Encounter Problems (Active)       ADLs       Pt will complete ADL tasks at mod I with use of AE prn  (Progressing)       Start:  07/22/24    Expected End:  08/01/24               Functional Mobility       Pt will perform functional mobility household distances at mod I with use of RW.    (Progressing)       Start:  07/22/24    Expected End:  08/26/24               OT Transfers       Pt will perform BUE exercises to improve strength and independence with functional transfers/ADL tasks.   (Progressing)       Start:  07/22/24    Expected End:  08/26/24

## 2024-07-31 NOTE — PROGRESS NOTES
Yan Powell is a 73 y.o. female on day 12 of admission presenting with Cardiac arrest (Multi).      Subjective   Seen sleeping comfortably. Awakens easily to voice. States she feels well this morning, just tired. Denies any urinary symptoms. Reports knee pain.        Objective     Last Recorded Vitals  /77 (BP Location: Right arm, Patient Position: Lying)   Pulse 78   Temp 36.4 °C (97.5 °F) (Temporal)   Resp 18   Wt 94.8 kg (208 lb 15.9 oz)   SpO2 98%   Intake/Output last 3 Shifts:    Intake/Output Summary (Last 24 hours) at 7/31/2024 0859  Last data filed at 7/31/2024 0622  Gross per 24 hour   Intake 100 ml   Output 300 ml   Net -200 ml       Admission Weight  Weight: 117 kg (258 lb 6.1 oz) (07/19/24 1251)    Daily Weight  07/31/24 : 94.8 kg (208 lb 15.9 oz)    Image Results  Transthoracic Echo (TTE) [place] [address]             Phone [phone]    TRANSTHORACIC ECHOCARDIOGRAM REPORT    Patient Name:      YAN POWELL         Reading Physician:    [id number] Juan Byrd MD  Study Date:        7/19/2024             Ordering Provider:    [id number] FLETCHER ABREU  MRN/PID:           [id number]              Fellow:  Accession#:        [id number]          Nurse:  Date of Birth/Age: 1950 / 73 years Sonographer:          Jennifer Childers RDCS  Gender:            F                     Additional Staff:  Height:            170.00 cm             Admit Date:  Weight:            117.00 kg             Admission Status:     Inpatient -                                                                 Routine  BSA / BMI:         2.25 m2 / 40.48 kg/m2 Department Location:  [facility] ICU  Blood Pressure: 125 /72 mmHg    Study Type:    TRANSTHORACIC ECHO (TTE)  COMPLETE  Diagnosis/ICD: Cardiac arrest, cause unspecified-I46.9  Indication:    cardiac arrest  CPT Codes:     Echo Complete w Full Doppler-[id number]    Patient History:  Pertinent History: Cardiac arrest vent hypotensive.    Study Detail: The following Echo studies were performed: 2D, M-Mode, Doppler and                color flow. Technically challenging study due to prominent lung                artifact, body habitus and patient lying in supine position.                Definity used as a contrast agent for endocardial border                definition. Total contrast used for this procedure was 2 mL via IV                push.       PHYSICIAN INTERPRETATION:  Left Ventricle: Left ventricular ejection fraction is mildly decreased, by visual estimate at 50%. There is global hypokinesis of the left ventricle with minor regional variations. The left ventricular cavity size is normal. Spectral Doppler shows a normal pattern of left ventricular diastolic filling.  Left Atrium: The left atrium is normal in size.  Right Ventricle: The right ventricle is normal in size. There is normal right ventricular global systolic function.  Right Atrium: The right atrium is normal in size.  Aortic Valve: The aortic valve appears structurally normal. The aortic valve dimensionless index is 0.42. There is mild aortic valve regurgitation. The peak instantaneous gradient of the aortic valve is 15.7 mmHg. The mean gradient of the aortic valve is 9.0 mmHg.  Mitral Valve: The mitral valve is normal in structure. There is no evidence of mitral valve regurgitation.  Tricuspid Valve: The tricuspid valve is structurally normal. There is trace tricuspid regurgitation. The Doppler estimated RVSP is mildly elevated at 33.0 mmHg.  Pulmonic Valve: The pulmonic valve is structurally normal. There is physiologic pulmonic valve regurgitation.  Pericardium: There is no pericardial effusion noted.  Aorta: The aortic root is normal.  Systemic Veins: The  inferior vena cava appears dilated. There is less than 50% IVC collapse with inspiration.       CONCLUSIONS:   1. Poorly visualized anatomical structures due to suboptimal image quality.   2. Left ventricular ejection fraction is mildly decreased, by visual estimate at 50%.   3. There is global hypokinesis of the left ventricle with minor regional variations.   4. There is normal right ventricular global systolic function.   5. No evidence of mitral valve regurgitation.   6. Mildly elevated RVSP.   7. Trace tricuspid regurgitation is visualized.   8. Mild aortic valve regurgitation.    QUANTITATIVE DATA SUMMARY:  2D MEASUREMENTS:                            Normal Ranges:  LAs:           2.80 cm    (2.7-4.0cm)  IVSd:          1.04 cm    (0.6-1.1cm)  LVPWd:         0.82 cm    (0.6-1.1cm)  LVIDd:         6.22 cm    (3.9-5.9cm)  LV Mass Index: 106.0 g/m2    LV SYSTOLIC FUNCTION BY 2D PLANIMETRY (MOD):                       Normal Ranges:  EF-A4C View:    50 % (>=55%)  EF-A2C View:    46 %  EF-Biplane:     50 %  EF-Visual:      50 %  LV EF Reported: 50 %    LV DIASTOLIC FUNCTION:                      Normal Ranges:  MV Peak E: 0.58 m/s (0.7-1.2 m/s)  MV Peak A: 0.82 m/s (0.42-0.7 m/s)  E/A Ratio: 0.71     (1.0-2.2)    MITRAL VALVE:                  Normal Ranges:  MV DT: 354 msec (150-240msec)    AORTIC VALVE:                                     Normal Ranges:  AoV Vmax:                1.98 m/s  (<=1.7m/s)  AoV Peak PG:             15.7 mmHg (<20mmHg)  AoV Mean P.0 mmHg  (1.7-11.5mmHg)  LVOT Max Dao:            0.78 m/s  (<=1.1m/s)  AoV VTI:                 39.70 cm  (18-25cm)  LVOT VTI:                16.50 cm  LVOT Diameter:           2.00 cm   (1.8-2.4cm)  AoV Area, VTI:           1.31 cm2  (2.5-5.5cm2)  AoV Area,Vmax:           1.23 cm2  (2.5-4.5cm2)  AoV Dimensionless Index: 0.42    TRICUSPID VALVE/RVSP:                              Normal Ranges:  Peak TR Velocity: 2.12 m/s  RV Syst Pressure: 33.0  mmHg (< 30mmHg)  IVC Diam:         2.27 cm    PULMONIC VALVE:                       Normal Ranges:  PV Max Dao: 0.7 m/s  (0.6-0.9m/s)  PV Max P.1 mmHg       [id number] Juan Byrd MD  Electronically signed on 2024 at 3:34:25 PM       ** Final **      Physical Exam  Constitutional:       General: She is not in acute distress.     Appearance: She is not toxic-appearing.   HENT:      Head: Normocephalic.      Mouth/Throat:      Pharynx: Oropharynx is clear.   Eyes:      General: No scleral icterus.  Cardiovascular:      Rate and Rhythm: Normal rate.   Pulmonary:      Effort: No respiratory distress.      Breath sounds: No wheezing.   Abdominal:      General: There is no distension.      Palpations: Abdomen is soft.   Musculoskeletal:      Right lower leg: No edema.      Left lower leg: No edema.   Neurological:      Mental Status: She is alert.   Psychiatric:         Behavior: Behavior normal.         Relevant Results               Assessment/Plan          Principal Problem:    Cardiac arrest (Multi)  Active Problems:    Cardiac arrest with ventricular fibrillation (Multi)    KASH on CKD3  Unclear etiology  Bladder scan this AM showing 360cc retention - discussed with RN to straight cath  Consult nephrology  Hold allopurinol for now    Vfib cardiac arrest   EP following  Cardiology following  S/p cardiac catheterization , no obstructing lesions, did not require PCI  Family now agreeable to ICD placement - planning for ICD placement today with Dr. Goldman  Continue metoprolol and amiodarone     Acute metabolic encephalopathy  Evaluated by neurology  Psychiatry following  Suspect 2/2 anoxic brain injury  Remains A&Ox1 to self only  S/p EEG  showing mild diffuse encephalopathy  Continue zyprexa and melatonin nightly  Continue hydroxyzine PRN and olanzapine PRN for anxiety/agitation per psych     Leukocytosis  Completed a course of antibiotics in the ICU  Monitor off antibiotics for now in the absence of s/s  of infection     Acute hypoxic and hypercapnic respiratory failure, resolved  Stable on room air     Hypokalemia, resolved  Replace PRN     Debility  PT/OT- recommending moderate intensity rehab  Family agreeable to SNF     Discussed with Grace Russ that family is requesting to speak with Dr. Goldman - states she will talk to patient's family.      Plan:  Unclear etiology of KASH, mild retention on bladder scan but was previously urinating without difficulty  Discussed with RN to straight cath  Nephrology consult  ICD placement today with Dr. Goldman  NPO for procedure  Discharge planning - family agreeable to SNF placement, awaiting facility acceptance and precert              Araseli Nash MD

## 2024-07-31 NOTE — PROGRESS NOTES
07/31/24 1419   Discharge Planning   Expected Discharge Disposition SNF   Does the patient need discharge transport arranged? Yes   RoundTrip coordination needed? Yes     Spoke with patients spouse to discuss alternative SNF choices.  Updated spouse that both [facility] and [facility] have declined due to no bed availability.      Spouse agreed to review the list and provide choices by tomorrow morning.

## 2024-07-31 NOTE — CONSULTS
CONSULT: Nephrology SERVICE    SERVICE DATE: 7/31/2024   SERVICE TIME:  11:19 AM    REASON FOR CONSULT: Acute renal failure  REQUESTING PHYSICIAN: Dr. Nash  PRIMARY CARE PHYSICIAN: Afshin Weinberg MD    ASSESSMENT AND PLAN  73-year-old with hypertension and gout admitted after cardiac arrest, seen for acute renal failure.  1.  Acute renal failure  2.  Hypotension    The patient may be getting mildly volume depleted.  I do not think this is necessarily an obstructive picture given the lack of significant volume on a postvoid residual, but I will check a sonogram.  Check urinalysis, check urine sodium.  Empirically begin normal saline at 100 mL an hour overnight.  I reviewed all the medications that the patient has received and there really are no obvious nephrotoxins.  The blood pressure is running marginally low, further supporting possible volume depletion.  Baseline serum creatinine should be 0.9.  Trend daily labs, continue supportive care otherwise.  I will follow this patient.  Thank you for the consultation     SUBJECTIVE  Ms. Powell is a 73 y.o. woman with a history of hypertension and gout admitted after a cardiac arrest, consulted for acute renal failure.  The patient has a baseline creatinine of 0.9.  Patient came in with a creatinine of 1.3, fluctuated a bit, more recently it has been leveling off in the low ones, drifted up to 2.0 today prompting renal consultation.  The patient had almost 400 mL on bladder scan, but and negligible postvoid residual.  She describes no specific diarrhea, no nausea, no vomiting.  She really does not have any chest pain or dyspnea.  She has been eating.  The blood pressure has been running marginally low.  She awaits possible consideration for pacemaker implantation.  She describes no dysuria.  There have been no fevers.    PAST MEDICAL HISTORY: Hypertension, gout  PAST SURGICAL HISTORY:   Past Surgical History:   Procedure Laterality Date    CARDIAC CATHETERIZATION N/A  7/23/2024    Procedure: Left Heart Cath;  Surgeon: Trever Rojo MD;  Location: [facility];  Service: Cardiovascular;  Laterality: N/A;     FAMILY HISTORY: No notable renal dysfunction  SOCIAL HISTORY:   Social History     Tobacco Use    Smoking status: Never     Passive exposure: Never (RODRICK - pt intubated and sedated)    Smokeless tobacco: Never     MEDICATIONS:  [Held by provider] allopurinol, 300 mg, oral, Daily  amiodarone, 200 mg, oral, Daily  amLODIPine, 5 mg, oral, Daily  aspirin, 81 mg, oral, Daily  atorvastatin, 10 mg, oral, Nightly  ceFAZolin, 2 g, intravenous, Once  [Held by provider] enoxaparin, 40 mg, subcutaneous, Daily  melatonin, 3 mg, oral, Daily  metoprolol succinate XL, 75 mg, oral, BID  OLANZapine zydis, 2.5 mg, oral, Nightly  oxygen, , inhalation, Continuous - 02/gases  polyethylene glycol, 17 g, oral, Daily  [Held by provider] potassium chloride, 20 mEq, oral, BID  sennosides-docusate sodium, 1 tablet, oral, BID           PRN medications: acetaminophen, bisacodyl, dextrose, dextrose, glucagon, glucagon, hydrOXYzine HCL, OLANZapine, OLANZapine, oxygen   CURRENT ALLERGIES:   Allergies as of 07/19/2024    (No Known Allergies)       COMPLETE REVIEW OF SYSTEMS:    Full ROS was negative unless mentioned above    OBJECTIVE  PHYSICAL EXAM  Heart Rate:  [78-85]   Temp:  [36.1 °C (97 °F)-36.8 °C (98.2 °F)]   Resp:  [18-22]   BP: (103-110)/(64-77)   Weight:  [94.8 kg (208 lb 15.9 oz)]   SpO2:  [97 %-100 %]    Body mass index is 32.73 kg/m².  This is an elderly black woman who appears in no acute distress  No obvious skin rashes  Hearing intact  Phonation intact  Moist mucosa  Occasionally irregular heart rate  Lungs are clear to auscultation bilaterally  Abdomen is soft, nondistended, nontender, positive bowel sounds  No Orellana catheter in place, no suprapubic tenderness to palpation  No extremity edema  Moves 4 limbs spontaneously  No obvious joint deformities  No lymphadenopathy  Somewhat  confused affect    DATA:   Labs:  Results for orders placed or performed during the hospital encounter of 07/19/24 (from the past 96 hour(s))   Basic metabolic panel   Result Value Ref Range    Glucose 93 65 - 99 mg/dL    Sodium 140 133 - 145 mmol/L    Potassium 4.6 3.4 - 5.1 mmol/L    Chloride 105 97 - 107 mmol/L    Bicarbonate 22 (L) 24 - 31 mmol/L    Urea Nitrogen 20 8 - 25 mg/dL    Creatinine 1.30 0.40 - 1.60 mg/dL    eGFR 44 (L) >60 mL/min/1.73m*2    Calcium 9.2 8.5 - 10.4 mg/dL    Anion Gap 13 <=19 mmol/L   CBC and Auto Differential   Result Value Ref Range    WBC 13.7 (H) 4.4 - 11.3 x10*3/uL    nRBC 0.0 0.0 - 0.0 /100 WBCs    RBC 4.65 4.00 - 5.20 x10*6/uL    Hemoglobin 12.6 12.0 - 16.0 g/dL    Hematocrit 42.9 36.0 - 46.0 %    MCV 92 80 - 100 fL    MCH 27.1 26.0 - 34.0 pg    MCHC 29.4 (L) 32.0 - 36.0 g/dL    RDW 16.2 (H) 11.5 - 14.5 %    Platelets 203 150 - 450 x10*3/uL    Neutrophils % 74.9 40.0 - 80.0 %    Immature Granulocytes %, Automated 1.2 (H) 0.0 - 0.9 %    Lymphocytes % 13.8 13.0 - 44.0 %    Monocytes % 8.0 2.0 - 10.0 %    Eosinophils % 1.5 0.0 - 6.0 %    Basophils % 0.6 0.0 - 2.0 %    Neutrophils Absolute 10.24 (H) 1.60 - 5.50 x10*3/uL    Immature Granulocytes Absolute, Automated 0.16 0.00 - 0.50 x10*3/uL    Lymphocytes Absolute 1.88 0.80 - 3.00 x10*3/uL    Monocytes Absolute 1.09 (H) 0.05 - 0.80 x10*3/uL    Eosinophils Absolute 0.20 0.00 - 0.40 x10*3/uL    Basophils Absolute 0.08 0.00 - 0.10 x10*3/uL   Magnesium   Result Value Ref Range    Magnesium 2.00 1.60 - 3.10 mg/dL   Phosphorus   Result Value Ref Range    Phosphorus 2.7 2.5 - 4.5 mg/dL   Basic metabolic panel   Result Value Ref Range    Glucose 118 (H) 65 - 99 mg/dL    Sodium 140 133 - 145 mmol/L    Potassium 4.4 3.4 - 5.1 mmol/L    Chloride 103 97 - 107 mmol/L    Bicarbonate 20 (L) 24 - 31 mmol/L    Urea Nitrogen 20 8 - 25 mg/dL    Creatinine 1.20 0.40 - 1.60 mg/dL    eGFR 48 (L) >60 mL/min/1.73m*2    Calcium 9.2 8.5 - 10.4 mg/dL    Anion  Gap 17 <=19 mmol/L   CBC and Auto Differential   Result Value Ref Range    WBC 12.9 (H) 4.4 - 11.3 x10*3/uL    nRBC 0.0 0.0 - 0.0 /100 WBCs    RBC 4.64 4.00 - 5.20 x10*6/uL    Hemoglobin 12.7 12.0 - 16.0 g/dL    Hematocrit 40.3 36.0 - 46.0 %    MCV 87 80 - 100 fL    MCH 27.4 26.0 - 34.0 pg    MCHC 31.5 (L) 32.0 - 36.0 g/dL    RDW 16.1 (H) 11.5 - 14.5 %    Platelets 220 150 - 450 x10*3/uL    Neutrophils % 76.4 40.0 - 80.0 %    Immature Granulocytes %, Automated 1.1 (H) 0.0 - 0.9 %    Lymphocytes % 12.2 13.0 - 44.0 %    Monocytes % 8.3 2.0 - 10.0 %    Eosinophils % 1.5 0.0 - 6.0 %    Basophils % 0.5 0.0 - 2.0 %    Neutrophils Absolute 9.89 (H) 1.60 - 5.50 x10*3/uL    Immature Granulocytes Absolute, Automated 0.14 0.00 - 0.50 x10*3/uL    Lymphocytes Absolute 1.58 0.80 - 3.00 x10*3/uL    Monocytes Absolute 1.07 (H) 0.05 - 0.80 x10*3/uL    Eosinophils Absolute 0.19 0.00 - 0.40 x10*3/uL    Basophils Absolute 0.07 0.00 - 0.10 x10*3/uL   Magnesium   Result Value Ref Range    Magnesium 2.00 1.60 - 3.10 mg/dL   Phosphorus   Result Value Ref Range    Phosphorus 3.3 2.5 - 4.5 mg/dL   Basic metabolic panel   Result Value Ref Range    Glucose 101 (H) 65 - 99 mg/dL    Sodium 141 133 - 145 mmol/L    Potassium 4.1 3.4 - 5.1 mmol/L    Chloride 105 97 - 107 mmol/L    Bicarbonate 22 (L) 24 - 31 mmol/L    Urea Nitrogen 21 8 - 25 mg/dL    Creatinine 1.30 0.40 - 1.60 mg/dL    eGFR 44 (L) >60 mL/min/1.73m*2    Calcium 8.8 8.5 - 10.4 mg/dL    Anion Gap 14 <=19 mmol/L   CBC and Auto Differential   Result Value Ref Range    WBC 10.9 4.4 - 11.3 x10*3/uL    nRBC 0.0 0.0 - 0.0 /100 WBCs    RBC 4.20 4.00 - 5.20 x10*6/uL    Hemoglobin 11.6 (L) 12.0 - 16.0 g/dL    Hematocrit 36.4 36.0 - 46.0 %    MCV 87 80 - 100 fL    MCH 27.6 26.0 - 34.0 pg    MCHC 31.9 (L) 32.0 - 36.0 g/dL    RDW 15.9 (H) 11.5 - 14.5 %    Platelets 213 150 - 450 x10*3/uL    Neutrophils % 72.8 40.0 - 80.0 %    Immature Granulocytes %, Automated 1.2 (H) 0.0 - 0.9 %    Lymphocytes %  15.2 13.0 - 44.0 %    Monocytes % 8.4 2.0 - 10.0 %    Eosinophils % 1.8 0.0 - 6.0 %    Basophils % 0.6 0.0 - 2.0 %    Neutrophils Absolute 7.92 (H) 1.60 - 5.50 x10*3/uL    Immature Granulocytes Absolute, Automated 0.13 0.00 - 0.50 x10*3/uL    Lymphocytes Absolute 1.66 0.80 - 3.00 x10*3/uL    Monocytes Absolute 0.92 (H) 0.05 - 0.80 x10*3/uL    Eosinophils Absolute 0.20 0.00 - 0.40 x10*3/uL    Basophils Absolute 0.06 0.00 - 0.10 x10*3/uL   Magnesium   Result Value Ref Range    Magnesium 2.00 1.60 - 3.10 mg/dL   Phosphorus   Result Value Ref Range    Phosphorus 3.9 2.5 - 4.5 mg/dL   Basic metabolic panel   Result Value Ref Range    Glucose 105 (H) 65 - 99 mg/dL    Sodium 139 133 - 145 mmol/L    Potassium 4.8 3.4 - 5.1 mmol/L    Chloride 105 97 - 107 mmol/L    Bicarbonate 23 (L) 24 - 31 mmol/L    Urea Nitrogen 34 (H) 8 - 25 mg/dL    Creatinine 2.00 (H) 0.40 - 1.60 mg/dL    eGFR 26 (L) >60 mL/min/1.73m*2    Calcium 8.9 8.5 - 10.4 mg/dL    Anion Gap 11 <=19 mmol/L   CBC and Auto Differential   Result Value Ref Range    WBC 11.5 (H) 4.4 - 11.3 x10*3/uL    nRBC 0.0 0.0 - 0.0 /100 WBCs    RBC 4.12 4.00 - 5.20 x10*6/uL    Hemoglobin 11.2 (L) 12.0 - 16.0 g/dL    Hematocrit 36.5 36.0 - 46.0 %    MCV 89 80 - 100 fL    MCH 27.2 26.0 - 34.0 pg    MCHC 30.7 (L) 32.0 - 36.0 g/dL    RDW 16.1 (H) 11.5 - 14.5 %    Platelets 212 150 - 450 x10*3/uL    Neutrophils % 70.6 40.0 - 80.0 %    Immature Granulocytes %, Automated 0.8 0.0 - 0.9 %    Lymphocytes % 17.4 13.0 - 44.0 %    Monocytes % 8.3 2.0 - 10.0 %    Eosinophils % 2.4 0.0 - 6.0 %    Basophils % 0.5 0.0 - 2.0 %    Neutrophils Absolute 8.13 (H) 1.60 - 5.50 x10*3/uL    Immature Granulocytes Absolute, Automated 0.09 0.00 - 0.50 x10*3/uL    Lymphocytes Absolute 2.01 0.80 - 3.00 x10*3/uL    Monocytes Absolute 0.96 (H) 0.05 - 0.80 x10*3/uL    Eosinophils Absolute 0.28 0.00 - 0.40 x10*3/uL    Basophils Absolute 0.06 0.00 - 0.10 x10*3/uL   Magnesium   Result Value Ref Range    Magnesium  2.40 1.60 - 3.10 mg/dL   Phosphorus   Result Value Ref Range    Phosphorus 4.4 2.5 - 4.5 mg/dL   Basic metabolic panel   Result Value Ref Range    Glucose 101 (H) 65 - 99 mg/dL    Sodium 141 133 - 145 mmol/L    Potassium 4.5 3.4 - 5.1 mmol/L    Chloride 105 97 - 107 mmol/L    Bicarbonate 23 (L) 24 - 31 mmol/L    Urea Nitrogen 34 (H) 8 - 25 mg/dL    Creatinine 2.00 (H) 0.40 - 1.60 mg/dL    eGFR 26 (L) >60 mL/min/1.73m*2    Calcium 8.6 8.5 - 10.4 mg/dL    Anion Gap 13 <=19 mmol/L       SIGNATURE: Jered Poole MD PATIENT NAME: Nilam Powell   DATE: July 31, 2024 MRN: [id number]   TIME: 11:19 AM PAGER: [phone]

## 2024-07-31 NOTE — NURSING NOTE
Assumed care of patient.   Patient in bed resting.  Bedside shift report received.  No c/o pain or discomfort at this time.  Call light in reach.

## 2024-07-31 NOTE — CARE PLAN
The patient's goals for the shift include      The clinical goals for the shift include monitor telemetry and VS    Over the shift, the patient did not make progress toward the following goals. Barriers to progression include . Recommendations to address these barriers include   Problem: Knowledge Deficit  Goal: Patient/family/caregiver demonstrates understanding of disease process, treatment plan, medications, and discharge instructions  Outcome: Progressing     Problem: Pain - Adult  Goal: Verbalizes/displays adequate comfort level or baseline comfort level  Outcome: Progressing     Problem: Safety - Adult  Goal: Free from fall injury  Outcome: Progressing     Problem: Discharge Planning  Goal: Discharge to home or other facility with appropriate resources  Outcome: Progressing     Problem: Chronic Conditions and Co-morbidities  Goal: Patient's chronic conditions and co-morbidity symptoms are monitored and maintained or improved  Outcome: Progressing     Problem: Skin  Goal: Decreased wound size/increased tissue granulation at next dressing change  Outcome: Progressing  Flowsheets (Taken 7/30/2024 2106)  Decreased wound size/increased tissue granulation at next dressing change: Promote sleep for wound healing  Goal: Participates in plan/prevention/treatment measures  Outcome: Progressing  Flowsheets (Taken 7/30/2024 2106)  Participates in plan/prevention/treatment measures: Elevate heels  Goal: Prevent/manage excess moisture  Outcome: Progressing  Flowsheets (Taken 7/30/2024 2106)  Prevent/manage excess moisture: Cleanse incontinence/protect with barrier cream  Goal: Prevent/minimize sheer/friction injuries  Outcome: Progressing  Flowsheets (Taken 7/30/2024 2106)  Prevent/minimize sheer/friction injuries: Turn/reposition every 2 hours/use positioning/transfer devices  Goal: Promote/optimize nutrition  Outcome: Progressing  Flowsheets (Taken 7/30/2024 2106)  Promote/optimize nutrition: Assist with feeding  Goal:  Promote skin healing  Outcome: Progressing  Flowsheets (Taken 7/30/2024 2106)  Promote skin healing: Assess skin/pad under line(s)/device(s)     Problem: Nutrition  Goal: Less than 5 days NPO/clear liquids  Outcome: Progressing  Goal: Oral intake greater than 50%  Outcome: Progressing  Goal: Oral intake greater 75%  Outcome: Progressing  Goal: Consume prescribed supplement  Outcome: Progressing  Goal: Adequate PO fluid intake  Outcome: Progressing  Goal: Nutrition support goals are met within 48 hrs  Outcome: Progressing  Goal: Nutrition support is meeting 75% of nutrient needs  Outcome: Progressing  Goal: Tube feed tolerance  Outcome: Progressing  Goal: BG  mg/dL  Outcome: Progressing  Goal: Lab values WNL  Outcome: Progressing  Goal: Electrolytes WNL  Outcome: Progressing  Goal: Promote healing  Outcome: Progressing  Goal: Maintain stable weight  Outcome: Progressing     Problem: Fall/Injury  Goal: Not fall by end of shift  Outcome: Progressing  Goal: Be free from injury by end of the shift  Outcome: Progressing  Goal: Verbalize understanding of personal risk factors for fall in the hospital  Outcome: Progressing  Goal: Verbalize understanding of risk factor reduction measures to prevent injury from fall in the home  Outcome: Progressing  Goal: Use assistive devices by end of the shift  Outcome: Progressing  Goal: Pace activities to prevent fatigue by end of the shift  Outcome: Progressing     Problem: Risk for falls  Goal: I will remain free from falls  Outcome: Progressing     Problem: ADLs  Goal: Pt will complete ADL tasks at mod I with use of AE prn   Outcome: Progressing

## 2024-07-31 NOTE — PROGRESS NOTES
Physical Therapy    Physical Therapy Treatment    Patient Name: Nilam Powell  MRN: [id number]  Today's Date: 7/31/2024  Time Calculation  Start Time: 1302  Stop Time: 1318  Time Calculation (min): 16 min    Assessment/Plan   PT Assessment  PT Assessment Results: Decreased strength, Decreased range of motion, Decreased endurance, Impaired balance, Decreased mobility, Decreased coordination, Decreased cognition, Impaired judgement, Decreased safety awareness, Pain, Obesity  Rehab Prognosis: Good  Evaluation/Treatment Tolerance: Patient tolerated treatment well  End of Session Communication: Bedside nurse  Assessment Comment: pt progressing well; pt participatory; improved overall mobility today; MIN/MOD A for transfers and gait.  End of Session Patient Position: Bed, 3 rail up  PT Plan  Inpatient/Swing Bed or Outpatient: Inpatient  PT Plan  Treatment/Interventions: Bed mobility, Transfer training, Gait training, Stair training, Balance training, Neuromuscular re-education, Strengthening, Endurance training, Range of motion, Therapeutic exercise, Therapeutic activity  PT Plan: Ongoing PT  PT Frequency: 6 times per week  PT Discharge Recommendations: Moderate intensity level of continued care  Equipment Recommended upon Discharge: Wheeled walker  PT Recommended Transfer Status:  (MOD A)  PT - OK to Discharge: Yes      General Visit Information:   PT  Visit  PT Received On: 07/31/24  General  Reason for Referral: s/p cardiac arrest requiring intubation/ extubation, AMS; impaired mobility  Referred By: Demetrice Mendoza PA-C  Past Medical History Relevant to Rehab: HTN, DLP, gout, uterine fibroid, Colonoscopy, hysterectomy  Family/Caregiver Present:  (pts family present)  Prior to Session Communication: Bedside nurse  Patient Position Received: Up in chair, Alarm on  General Comment: pt alert and talkative; pt able to follow commands.    Subjective   Precautions:  Precautions  Hearing/Visual Limitations: Glasses  Medical  Precautions: Fall precautions       Objective   Pain:  Pain Assessment  Pain Assessment:  (0/10)    Cognition:  Cognition  Overall Cognitive Status: Impaired  Orientation Level: Disoriented to place  Processing Speed: Delayed    Coordination:  Coordination Comment: short B step length    Postural Control:  Postural Control  Posture Comment: forward flexed head down posture      Activity Tolerance:  Activity Tolerance  Endurance:  (GOOD- activity tolerance)      Treatments:  Therapeutic Exercise Performed:  (N/A - pt off to procedure)      Bed Mobility:  sit to supine with MIN A for trunk down and B LE. call button in reach. transport present to take pt for procedure      Ambulation/Gait Training Performed:  pt amb 8' x 1 using RW with MOD A. then took 10 lateral steps using RW toward HOB with MIN A. pt presented with inconsistent steps and soft flexed knees.      Transfer:  sit to stand with MIN A using rolling walker; mild sway upon standing; stand to sit with MIN A. VC for safe hand placement. GOOD sitting balance noted.        Outcome Measures:  [facility] Basic Mobility  Turning from your back to your side while in a flat bed without using bedrails: A lot  Moving from lying on your back to sitting on the side of a flat bed without using bedrails: A lot  Moving to and from bed to chair (including a wheelchair): A lot  Standing up from a chair using your arms (e.g. wheelchair or bedside chair): A little  To walk in hospital room: A little  Climbing 3-5 steps with railing: Total  Basic Mobility - Total Score: 13        Encounter Problems       Encounter Problems (Active)       Balance       STG - Maintains static standing balance with upper extremity support and min assist x 1. (Progressing)       Start:  07/22/24    Expected End:  08/22/24       INTERVENTIONS:  1. Practice standing with minimal support.  2. Educate patient about standing tolerance.  3. Educate patient about independence with gait, transfers, and  ADL's.  4. Educate patient about use of assistive device.  5. Educate patient about self-directed care.         STG - Maintains static sitting balance with upper extremity support and supervision for safety. (Progressing)       Start:  07/22/24    Expected End:  08/22/24       INTERVENTIONS:  1. Practice sitting on the edge of a bed/mat with minimal support.  2. Educate patient about maintining total hip precautions while maintaining balance.  3. Educate patient about pressure relief.  4. Educate patient about use of assistive device.            Mobility       STG - Patient will ambulate 50' with use of rolling walker and min assist x 1. (Progressing)       Start:  07/22/24    Expected End:  08/22/24            STG - Patient will ambulate up and down a curb/step with min assist x 1. (Progressing)       Start:  07/22/24    Expected End:  08/22/24               PT Transfers       STG - Patient to transfer to and from sit to supine with supervision for safety. (Progressing)       Start:  07/22/24    Expected End:  08/22/24            STG - Patient will transfer sit to and from stand with use of rolling walker and min assist x 1. (Progressing)       Start:  07/22/24    Expected End:  08/22/24               Pain - Adult          Safety       STG - Patient uses call light consistently to request assistance with transfers (Progressing)       Start:  07/22/24    Expected End:  08/22/24

## 2024-07-31 NOTE — PROGRESS NOTES
[facility]    [address]    [place] [address]    Phone:  [phone]       Thank You for choosing us for your health care visit. We are glad to serve you and happy to provide you with this summary of your visit. Please help us to ensure we have accurate records. If you find anything that needs to be changed, please let our staff know as soon as possible.          Your Demographic Information     Patient Name Sex Chantelle Rodrigues Female 1998       Ethnic Group Patient Race    Not of  or  Origin White      Your Visit Details     Date & Time Provider Department    2017 4:30 PM Jm Mendoza,  [facility]      Your Upcoming Appointment*(Max 10)     2017  2:30 PM CST   Cons[facility] with Patrick Nathan MD   [facility]    [id number] [facility] [address]   [phone]              Your To Do List     Follow-Up    Return in about 1 week (around 2017) for primary care physician..      Conditions Discussed Today or Order-Related Diagnoses        Comments    Bilateral acute serous otitis media, recurrence not specified    -  Primary     Acute non-recurrent maxillary sinusitis         DNS (deviated nasal septum)           Your Vitals Were     BP Pulse Temp Height    112/70 (48 %/ 64 %)* (BP Location: E, Patient Position: Sitting) 83 98.6 °F (37 °C) (Oral) 5' 7\" (1.702 m) (86 %, Z= 1.07)†    Weight SpO2 BMI Smoking Status    151 lb 12.8 oz (68.9 kg) (83 %, Z= 0.97)† 98% 23.78 kg/m2 (72 %, Z= 0.60)† Never Smoker    *BP percentiles are based on NHBPEP's 4th Report    †Growth percentiles are based on CDC 2-20 Years data.      Medications Prescribed or Re-Ordered Today     cefdinir (OMNICEF) 300 MG capsule    Sig - Route: Take 1 capsule by mouth 2 times daily for 10 days. - Oral    Class: Eprescribe    Pharmacy: [id number] -  "Nilam Powell is a 73 y.o. female on day 12 of admission presenting with Cardiac arrest (Multi).    Subjective   The patient is slowly recovering from a neurological perspective and feeling somewhat improved.  Her memory is also improving per her .  She is slated for ICD implant today for secondary prevention of sudden cardiac death       Objective     Physical Exam  General: Alert, oriented to person, place, date/time, pleasant  HEENT: Normocephalic, eyes normal ears normal  Heart: Normal S1, S2 , rhythm regular, no rubs or gallops   Respiratory: Lungs clear to auscultation bilaterally, no rales, crackles, wheezes or rhonchi  Abdomen: Bowel sounds present in all quadrants  Extremities: No upper or lower extremity edema appreciated  Dermatology: Skin Normal  Genitourinary: Deferred  Neurological: Nonfocal, moves all extremities  Psychology :Appropriate affect and mood   Last Recorded Vitals  Blood pressure 86/60, pulse 70, temperature 36 °C (96.8 °F), temperature source Temporal, resp. rate 18, height 1.702 m (5' 7\"), weight 94.8 kg (208 lb 15.9 oz), SpO2 94%.  Intake/Output last 3 Shifts:  I/O last 3 completed shifts:  In: 100 (1.1 mL/kg) [IV Piggyback:100]  Out: 300 (3.2 mL/kg) [Urine:300 (0.1 mL/kg/hr)]  Weight: 94.8 kg     Relevant Results                             Assessment/Plan   Principal Problem:    Cardiac arrest (Multi)  Active Problems:    Cardiac arrest with ventricular fibrillation (Multi)    VF arrest.  For ICD implant today for secondary prevention of sudden cardiac death.  The consent was signed by myself and a witness per phone conversation with the        I spent 30 minutes in the professional and overall care of this patient.      Mariano Goldman MD      " [address] Ph #: [phone]    predniSONE (DELTASONE) 5 MG tablet    Sig - Route: Take 4 tablets by mouth daily. - Oral    Class: Eprescribe    Pharmacy: [id number] - [address] Ph #: [phone]      Your Current Medications Are        Disp Refills Start End    cefdinir (OMNICEF) 300 MG capsule 20 capsule 0 2/5/2017 2/15/2017    Sig - Route: Take 1 capsule by mouth 2 times daily for 10 days. - Oral    Class: Eprescribe    predniSONE (DELTASONE) 5 MG tablet 30 tablet 0 2/5/2017     Sig - Route: Take 4 tablets by mouth daily. - Oral    Class: Eprescribe      Allergies     Latex Other (See Comments)    Pt had testing done by chiropractor which showed \"latex sensitivity\" but pt has had no symptoms to date       Immunizations History as of 2/5/2017     Name Date    DT: Child type diphtheria/tetanus 6/13/2003, 12/9/1999, 1998, 1998, 1998    HEPATITIS A CHILD 3/4/2015    Hib/hepatitis B 6/17/1999, 1998, 1998    INFLUENZA QUADRIVALENT 11/8/2016    Influenza 9/27/2012, 12/7/2011, 9/28/2010, 10/8/2009, 10/16/2008, 11/8/2007, 11/14/2006, 10/6/2005    Influenza Quadrivalent Live 10/6/2014    MMR 6/16/2003, 6/17/1999    Meningococcal Conjugate MCV4P (Menactra) 3/4/2015    Meningococcus 6/23/2009    PPD 11/16/2007    Polio, INACTIVATED 6/13/2003, 1998, 1998    Polio, ORAL 6/17/1999    Tdap 6/23/2009    Varicella 6/23/2009, 5/10/2000      Problem List as of 2/5/2017     Tachycardia, unspecified    Factor XI deficiency    Stria atrophica    Refractive amblyopia of left eye    Deviated nasal septum    Nasal Osseous mass              Patient Instructions      Common Middle Ear Problems  Your middle ear may have been injured or infected recently. Over time, certain growths or bone disease can also harm the middle ear. Left untreated, middle ear problems often lead to lifelong hearing loss. There are two types of hearing loss: conductive  and sensorineural. One or both kinds can occur. Injury, infection, certain growths, or bone disease can cause your symptoms. A ruptured eardrum or a long-lasting (chronic) ear infection may be painful and decrease hearing.  Symptoms  · Hearing loss in one or both ears  · Fluid, often smelly, draining from the ear  · Pain, pressure, or discomfort in the ear  · Ringing in the ear   Conductive and sensorineural hearing loss    Sound waves may be disrupted before they reach the inner ear. If this happens, conductive hearing loss may occur. The ear canal can be blocked by wax, infection, a tumor, or a foreign object. The eardrum can be injured or infected. Abnormal bone growth, infection, or tumors in the middle ear can block sound waves.  Sound waves may not be processed correctly in the inner ear. If this happens, sensorineural hearing loss may occur.  © 7432-6073 [employer]. [address] [place] [address]. All rights reserved. This information is not intended as a substitute for professional medical care. Always follow your healthcare professional's instructions.     Omnicef 300 milligrams twice a day with food for 10 days also   prednisone 5 mg and diminishing doses for 12 days.   Afrin long-acting nasal spray 2 puffs each nostril twice a day for 3 days on and 3 days off as per ENT and  follow-up with primary care physician in 7-10 days.   Call clinic if condition worsens

## 2024-07-31 NOTE — POST-PROCEDURE NOTE
Physician Transition of Care Summary  Invasive Cardiovascular Lab    Procedure Date: 7/31/2024  Attending:    * Mariano Goldman - Primary  Resident/Fellow/Other Assistant: Surgeons and Role:  * No surgeons found with a matching role *    Indications:   Pre-op Diagnosis      * Cardiac arrest (Multi) [I46.9]  The patient is a 73-year-old female with a history of VF arrest.  She had normal epicardial coronary arteries and fairly preserved LV systolic function.  She is here for an ICD for secondary prevention starting cardiac death.  She does not meet criteria for CRT.  Her New [place] Heart Association is class II  Post-procedure diagnosis:   Post-op Diagnosis     * Cardiac arrest (Multi) [I46.9]    Procedure(s):   ICD Dual Implant  93815 - OK INSJ/RPLCMT PERM DFB W/TRNSVNS LDS 1/DUAL CHMBR        Procedure Findings:   Please see below    Description of the Procedure:   After written witnessed informed consent was obtained the procedure from the patient, the patient was transferred to the EP laboratory. The patient was in the fasting state. A grounding pad was placed. The patient was set up for monitoring of surface ECG. The  Left upper chest was prepped and draped in the usual sterile fashion. Bupivacaine was administered locally for anesthetic. A 4cm incision was placed at the Left deltopectoral groove.    A prepectoral pocket was made to accommodate the device generator.  Left subclavian vein access was obtained with a micropuncture kit for both leads  The right ventricular shocking lead was delivered to the RV via a peel-away 9 Martiniquais sheath to the RV apical region under the fluoroscopic guidance. Position was confirmed in the YI and HUMPHRIES projections. The helix was deployed, and two-way communication was set up with the device interrogator. The lead is a single coil shocking lead. The sensing was 8.7 mv, with threshold for ventricular capture at 0.4V at 0.5 ms pulse width, impedance was 549 ohms. . The lead was  sutured at the suture sleeves of the pectoralis minor muscle. There was no phrenic nerve stimulation maximal output.    The right atrial lead was delivered to the RA via a peel-away 6French sheath to the RAA region under fluoroscopic guidance. Position was confirmed in the Slovenian and HUMPHRIES projections. The helix was deployed, and two way communication was set up with the device interrogator. The sensing was 3.7 mv, with threshold for ventricular capture at 1.0V at 0.5 ms pulse width, impedance was 811 ohms. The lead was sutured at the suture sleeves of the pectoralis minor muscle. There was no phrenic nerve stimulation maximal output.    Both leads were attached to the generator header and placed in a preformed generator pocket.   A Tyrex antibiotic pouch was utilized  The incision was closed with 3 layers of suture. A 0 Vicryl interrupted layer, followed by a 3.0 B. V-Loc continuous layer, and a 4.0 V-loc continuous layer for the subcuticular layer. Steri-Strips and a dressing were applied.    Summary   Successful dual-chamber ICD implant     Complications:   None    Stents/Implants:   Implants       Pacemaker    Lead, Sense Defb, Steroid Eluting, Biopolar, 64cm - [id number] - Implanted        Inventory item: LEAD, SENSE DEFB, STEROID ELUTING, BIOPOLAR, 64CM Model/Cat number: [id number]    Serial number: [id number] : [employer]    Lot number: [id number] Device identifier: [id number]    Implant Date: 7/31/2024        As of 7/31/2024       Status: Implanted                      Lead, Pacing, Ingevity Plus, 52 Cm - [id number] - Implanted        Inventory item: LEAD, PACING, INGEVITY PLUS, 52 CM Model/Cat number: [id number]    Serial number: [id number] : [employer]    Lot number: [id number] Implant Date: 7/31/2024      As of 7/31/2024       Status: Implanted                      Defibrillator, Icd, Elsa [address] Dr, Df4 Dual Chamber - [id number] - Implanted        Inventory item: DEFIBRILLATOR, ICD,  SERGIO SMITH DR, DF4 DUAL CHAMBER Model/Cat number: [id number]    Serial number: [id number] : [employer]    Lot number: [id number] Device identifier: [id number]    Implant Date: 7/31/2024        As of 7/31/2024       Status: Implanted                              Anticoagulation/Antiplatelet Plan:   None    Estimated Blood Loss:   5 mL    Anesthesia: Moderate Sedation Anesthesia Staff: No anesthesia staff entered.    Any Specimen(s) Removed:   No specimens collected during this procedure.    Disposition:   Stable      Electronically signed by: Mariano Goldman MD, 7/31/2024 3:14 PM

## 2024-08-01 LAB
ALBUMIN SERPL-MCNC: 3.2 G/DL (ref 3.5–5)
ALBUMIN SERPL-MCNC: 3.3 G/DL (ref 3.5–5)
ANION GAP SERPL CALC-SCNC: 13 MMOL/L
ANION GAP SERPL CALC-SCNC: 14 MMOL/L
APPEARANCE UR: CLEAR
BASOPHILS # BLD AUTO: 0.06 X10*3/UL (ref 0–0.1)
BASOPHILS NFR BLD AUTO: 0.5 %
BILIRUB UR STRIP.AUTO-MCNC: NEGATIVE MG/DL
BODY SURFACE AREA: 2.08 M2
BUN SERPL-MCNC: 27 MG/DL (ref 8–25)
BUN SERPL-MCNC: 31 MG/DL (ref 8–25)
CALCIUM SERPL-MCNC: 8.6 MG/DL (ref 8.5–10.4)
CALCIUM SERPL-MCNC: 8.7 MG/DL (ref 8.5–10.4)
CHLORIDE SERPL-SCNC: 106 MMOL/L (ref 97–107)
CHLORIDE SERPL-SCNC: 106 MMOL/L (ref 97–107)
CO2 SERPL-SCNC: 20 MMOL/L (ref 24–31)
CO2 SERPL-SCNC: 21 MMOL/L (ref 24–31)
COLOR UR: NORMAL
CREAT SERPL-MCNC: 1.6 MG/DL (ref 0.4–1.6)
CREAT SERPL-MCNC: 1.7 MG/DL (ref 0.4–1.6)
CREAT UR-MCNC: 78.1 MG/DL
EGFRCR SERPLBLD CKD-EPI 2021: 32 ML/MIN/1.73M*2
EGFRCR SERPLBLD CKD-EPI 2021: 34 ML/MIN/1.73M*2
EOSINOPHIL # BLD AUTO: 0.25 X10*3/UL (ref 0–0.4)
EOSINOPHIL NFR BLD AUTO: 2 %
ERYTHROCYTE [DISTWIDTH] IN BLOOD BY AUTOMATED COUNT: 15.8 % (ref 11.5–14.5)
GLUCOSE SERPL-MCNC: 107 MG/DL (ref 65–99)
GLUCOSE SERPL-MCNC: 97 MG/DL (ref 65–99)
GLUCOSE UR STRIP.AUTO-MCNC: NORMAL MG/DL
HCT VFR BLD AUTO: 35.9 % (ref 36–46)
HGB BLD-MCNC: 11.3 G/DL (ref 12–16)
IMM GRANULOCYTES # BLD AUTO: 0.08 X10*3/UL (ref 0–0.5)
IMM GRANULOCYTES NFR BLD AUTO: 0.7 % (ref 0–0.9)
KETONES UR STRIP.AUTO-MCNC: NEGATIVE MG/DL
LEUKOCYTE ESTERASE UR QL STRIP.AUTO: NEGATIVE
LYMPHOCYTES # BLD AUTO: 1.42 X10*3/UL (ref 0.8–3)
LYMPHOCYTES NFR BLD AUTO: 11.6 %
MCH RBC QN AUTO: 27.1 PG (ref 26–34)
MCHC RBC AUTO-ENTMCNC: 31.5 G/DL (ref 32–36)
MCV RBC AUTO: 86 FL (ref 80–100)
MONOCYTES # BLD AUTO: 0.8 X10*3/UL (ref 0.05–0.8)
MONOCYTES NFR BLD AUTO: 6.5 %
NEUTROPHILS # BLD AUTO: 9.63 X10*3/UL (ref 1.6–5.5)
NEUTROPHILS NFR BLD AUTO: 78.7 %
NITRITE UR QL STRIP.AUTO: NEGATIVE
NRBC BLD-RTO: 0 /100 WBCS (ref 0–0)
PH UR STRIP.AUTO: 5 [PH]
PHOSPHATE SERPL-MCNC: 3.4 MG/DL (ref 2.5–4.5)
PHOSPHATE SERPL-MCNC: 3.6 MG/DL (ref 2.5–4.5)
PLATELET # BLD AUTO: 195 X10*3/UL (ref 150–450)
POTASSIUM SERPL-SCNC: 4 MMOL/L (ref 3.4–5.1)
POTASSIUM SERPL-SCNC: 4.2 MMOL/L (ref 3.4–5.1)
PROT UR STRIP.AUTO-MCNC: NEGATIVE MG/DL
RBC # BLD AUTO: 4.17 X10*6/UL (ref 4–5.2)
RBC # UR STRIP.AUTO: NEGATIVE /UL
SODIUM SERPL-SCNC: 140 MMOL/L (ref 133–145)
SODIUM SERPL-SCNC: 140 MMOL/L (ref 133–145)
SODIUM UR-SCNC: 69 MMOL/L
SODIUM/CREAT UR-RTO: 88 MMOL/G CREAT
SP GR UR STRIP.AUTO: 1.01
UROBILINOGEN UR STRIP.AUTO-MCNC: NORMAL MG/DL
WBC # BLD AUTO: 12.2 X10*3/UL (ref 4.4–11.3)

## 2024-08-01 PROCEDURE — 2500000004 HC RX 250 GENERAL PHARMACY W/ HCPCS (ALT 636 FOR OP/ED): Performed by: INTERNAL MEDICINE

## 2024-08-01 PROCEDURE — 2500000001 HC RX 250 WO HCPCS SELF ADMINISTERED DRUGS (ALT 637 FOR MEDICARE OP): Performed by: INTERNAL MEDICINE

## 2024-08-01 PROCEDURE — 2500000002 HC RX 250 W HCPCS SELF ADMINISTERED DRUGS (ALT 637 FOR MEDICARE OP, ALT 636 FOR OP/ED): Performed by: INTERNAL MEDICINE

## 2024-08-01 PROCEDURE — 80069 RENAL FUNCTION PANEL: CPT | Performed by: INTERNAL MEDICINE

## 2024-08-01 PROCEDURE — 99232 SBSQ HOSP IP/OBS MODERATE 35: CPT | Performed by: STUDENT IN AN ORGANIZED HEALTH CARE EDUCATION/TRAINING PROGRAM

## 2024-08-01 PROCEDURE — 97116 GAIT TRAINING THERAPY: CPT | Mod: GP

## 2024-08-01 PROCEDURE — 36415 COLL VENOUS BLD VENIPUNCTURE: CPT | Performed by: INTERNAL MEDICINE

## 2024-08-01 PROCEDURE — 2060000001 HC INTERMEDIATE ICU ROOM DAILY

## 2024-08-01 PROCEDURE — 81003 URINALYSIS AUTO W/O SCOPE: CPT | Performed by: INTERNAL MEDICINE

## 2024-08-01 PROCEDURE — 85025 COMPLETE CBC W/AUTO DIFF WBC: CPT | Performed by: INTERNAL MEDICINE

## 2024-08-01 PROCEDURE — 84300 ASSAY OF URINE SODIUM: CPT | Performed by: INTERNAL MEDICINE

## 2024-08-01 PROCEDURE — 97535 SELF CARE MNGMENT TRAINING: CPT | Mod: GO,CO

## 2024-08-01 PROCEDURE — 71046 X-RAY EXAM CHEST 2 VIEWS: CPT | Performed by: RADIOLOGY

## 2024-08-01 PROCEDURE — 76770 US EXAM ABDO BACK WALL COMP: CPT | Performed by: RADIOLOGY

## 2024-08-01 PROCEDURE — 97110 THERAPEUTIC EXERCISES: CPT | Mod: GP

## 2024-08-01 PROCEDURE — 82570 ASSAY OF URINE CREATININE: CPT | Performed by: INTERNAL MEDICINE

## 2024-08-01 PROCEDURE — 84520 ASSAY OF UREA NITROGEN: CPT | Performed by: INTERNAL MEDICINE

## 2024-08-01 RX ADMIN — SENNOSIDES AND DOCUSATE SODIUM 1 TABLET: 50; 8.6 TABLET ORAL at 20:36

## 2024-08-01 RX ADMIN — POLYETHYLENE GLYCOL 3350 17 G: 17 POWDER, FOR SOLUTION ORAL at 10:23

## 2024-08-01 RX ADMIN — SENNOSIDES AND DOCUSATE SODIUM 1 TABLET: 50; 8.6 TABLET ORAL at 10:23

## 2024-08-01 RX ADMIN — METOPROLOL SUCCINATE 75 MG: 50 TABLET, EXTENDED RELEASE ORAL at 10:23

## 2024-08-01 RX ADMIN — OLANZAPINE 2.5 MG: 5 TABLET, ORALLY DISINTEGRATING ORAL at 21:00

## 2024-08-01 RX ADMIN — AMIODARONE HYDROCHLORIDE 200 MG: 200 TABLET ORAL at 10:23

## 2024-08-01 RX ADMIN — METOPROLOL SUCCINATE 75 MG: 50 TABLET, EXTENDED RELEASE ORAL at 20:36

## 2024-08-01 RX ADMIN — OLANZAPINE 2.5 MG: 10 INJECTION, POWDER, FOR SOLUTION INTRAMUSCULAR at 22:50

## 2024-08-01 RX ADMIN — ASPIRIN 81 MG 81 MG: 81 TABLET ORAL at 10:23

## 2024-08-01 RX ADMIN — Medication 3 MG: at 20:37

## 2024-08-01 RX ADMIN — ATORVASTATIN CALCIUM 10 MG: 10 TABLET, FILM COATED ORAL at 20:36

## 2024-08-01 ASSESSMENT — PAIN - FUNCTIONAL ASSESSMENT
PAIN_FUNCTIONAL_ASSESSMENT: 0-10
PAIN_FUNCTIONAL_ASSESSMENT: 0-10

## 2024-08-01 ASSESSMENT — COGNITIVE AND FUNCTIONAL STATUS - GENERAL
TOILETING: A LOT
TURNING FROM BACK TO SIDE WHILE IN FLAT BAD: A LOT
CLIMB 3 TO 5 STEPS WITH RAILING: A LOT
STANDING UP FROM CHAIR USING ARMS: A LITTLE
DRESSING REGULAR LOWER BODY CLOTHING: A LOT
TURNING FROM BACK TO SIDE WHILE IN FLAT BAD: A LOT
DRESSING REGULAR UPPER BODY CLOTHING: A LITTLE
DRESSING REGULAR UPPER BODY CLOTHING: A LITTLE
HELP NEEDED FOR BATHING: A LOT
MOBILITY SCORE: 15
MOVING TO AND FROM BED TO CHAIR: A LITTLE
CLIMB 3 TO 5 STEPS WITH RAILING: A LOT
MOVING FROM LYING ON BACK TO SITTING ON SIDE OF FLAT BED WITH BEDRAILS: A LOT
MOBILITY SCORE: 15
PERSONAL GROOMING: A LITTLE
WALKING IN HOSPITAL ROOM: A LITTLE
DAILY ACTIVITIY SCORE: 15
EATING MEALS: A LITTLE
STANDING UP FROM CHAIR USING ARMS: A LITTLE
DAILY ACTIVITIY SCORE: 15
TOILETING: A LOT
WALKING IN HOSPITAL ROOM: A LITTLE
MOVING TO AND FROM BED TO CHAIR: A LITTLE
EATING MEALS: A LITTLE
HELP NEEDED FOR BATHING: A LOT
DRESSING REGULAR LOWER BODY CLOTHING: A LOT
PERSONAL GROOMING: A LITTLE
MOVING FROM LYING ON BACK TO SITTING ON SIDE OF FLAT BED WITH BEDRAILS: A LOT

## 2024-08-01 ASSESSMENT — PAIN SCALES - GENERAL
PAINLEVEL_OUTOF10: 0 - NO PAIN
PAINLEVEL_OUTOF10: 0 - NO PAIN

## 2024-08-01 ASSESSMENT — ACTIVITIES OF DAILY LIVING (ADL): HOME_MANAGEMENT_TIME_ENTRY: 24

## 2024-08-01 NOTE — PROGRESS NOTES
Nilam Powell is a 73 y.o. female on day 13 of admission presenting with Cardiac arrest (Multi).      Subjective   Seen sitting up in bed eating lunch. States she feels well, doing better today than yesterday. Tolerating PO. Denies difficulty urinating and constipation.        Objective     Last Recorded Vitals  /90 (BP Location: Right arm, Patient Position: Lying)   Pulse 76   Temp 36 °C (96.8 °F) (Temporal)   Resp 20   Wt 91.6 kg (201 lb 15.1 oz)   SpO2 99%   Intake/Output last 3 Shifts:    Intake/Output Summary (Last 24 hours) at 8/1/2024 1252  Last data filed at 8/1/2024 1040  Gross per 24 hour   Intake 1360 ml   Output 5 ml   Net 1355 ml       Admission Weight  Weight: 117 kg (258 lb 6.1 oz) (07/19/24 1251)    Daily Weight  08/01/24 : 91.6 kg (201 lb 15.1 oz)    Image Results  Electrophysiology procedure  Recommendations:  1.  Follow-up in device clinic in 2 weeks  US renal complete  Narrative: Interpreted By:  Demarcus Mcknight,   STUDY:  US RENAL COMPLETE; 8/1/2024 8:09 am      INDICATION:  Acute renal failure..      COMPARISON:  None      ACCESSION NUMBER(S):  [id number]      ORDERING CLINICIAN:  ROMANA JOHNSON      TECHNIQUE:  Grayscale and color Doppler imaging of the kidneys      FINDINGS:  The right kidney measures 10.1 cm.  The left kidney measures 10.7 cm.          No renal stones are identified.  The renal cortical thickness and  echogenicity is normal.  No hydronephrosis is identified.      Urinary bladder is nonspecific in appearance with no stones or masses  identified.      Impression: Non specific appearance of the kidneys as described.      MACRO:  none      Signed by: Demarcus Mcknight 8/1/2024 8:24 AM  Dictation workstation:   [id number]      Physical Exam  Constitutional:       General: She is not in acute distress.     Appearance: She is not toxic-appearing.   HENT:      Head: Normocephalic.      Mouth/Throat:      Pharynx: Oropharynx is clear.   Eyes:      General: No scleral  icterus.  Cardiovascular:      Rate and Rhythm: Normal rate.   Pulmonary:      Effort: No respiratory distress.      Breath sounds: No wheezing.   Abdominal:      General: There is no distension.      Palpations: Abdomen is soft.   Musculoskeletal:      Right lower leg: No edema.      Left lower leg: No edema.   Neurological:      Mental Status: She is alert.   Psychiatric:         Behavior: Behavior normal.         Relevant Results               Assessment/Plan          Principal Problem:    Cardiac arrest (Multi)  Active Problems:    Cardiac arrest with ventricular fibrillation (Multi)    KASH on CKD3, improving   Unclear etiology  Nephrology following - suspect KASH may be due to hypovolemia/mild hypotension   Hold allopurinol for now  Amlodipine was discontinued with improvement in blood pressures and renal function      Vfib cardiac arrest   EP following  Cardiology following  S/p cardiac catheterization 7/23, no obstructing lesions, did not require PCI  S/p ICD placement 7/31/24  Continue metoprolol and amiodarone     Acute metabolic encephalopathy, improving   Evaluated by neurology  Psychiatry following  Suspect 2/2 anoxic brain injury  S/p EEG 7/24 showing mild diffuse encephalopathy  Continue zyprexa and melatonin nightly  Continue hydroxyzine PRN and olanzapine PRN for anxiety/agitation per psych     Leukocytosis  Completed a course of antibiotics in the ICU  Monitor off antibiotics for now in the absence of s/s of infection     Acute hypoxic and hypercapnic respiratory failure, resolved  Stable on room air     Hypokalemia, resolved  Replace PRN     Debility  PT/OT- recommending moderate intensity rehab  Family agreeable to SNF     Case discussed with Grace Russ. Reviewed nephrology note.      Plan:  Appears to be doing well, remains confused but is pleasant, is able to answer questions appropriately, and following commands  KASH improving after discontinuing amlodipine yesterday  Will have TCC submit  precert today  Hopeful that patient will be cleared for discharge to SNF in the next 24-72hrs                    Araseli Nash MD

## 2024-08-01 NOTE — NURSING NOTE
Pt up in chair, walked to bathroom and ambulated to doorway from bed. PT exercising now, pt much improved since yesterday.

## 2024-08-01 NOTE — PROGRESS NOTES
08/01/24 1059   Discharge Planning   Expected Discharge Disposition Home   Does the patient need discharge transport arranged? Yes   RoundTrip coordination needed? Yes     Spoke with patients spouse Cui and received the following choices:  [facility]  [facility]    UPDATE 1115:  [facility] is willing and able to accept.  Called to update spouse, agreeable to proceed with precert.    Information sent to direct submit team to initiate precert.      DC Plan NOT secure  Do NOT DC without speaking to care coordination, PRECERT PENDING FOR SNF

## 2024-08-01 NOTE — PROGRESS NOTES
CONSULT PROGRESS NOTES    SERVICE DATE: 8/1/2024   SERVICE TIME: 10:45 AM    CONSULTING SERVICE: Nephrology    ASSESSMENT AND PLAN   73-year-old with hypertension and gout admitted after cardiac arrest, seen for acute renal failure.  1.  Acute renal failure  2.  Hypotension     The acute renal failure is likely from mild volume depletion.  She had an unremarkable sonogram.  I am awaiting a urinalysis and urine sodium.  Maintain IV fluids with normal saline at 75 mL an hour overnight.  I reviewed all the medications that the patient has received and there really are no obvious nephrotoxins.  The blood pressure is now reasonably stable.  Baseline serum creatinine should be 0.9.  Trend daily labs, continue supportive care otherwise.  She seems to be quite encephalopathic.  I will follow this patient.    SUBJECTIVE  INTERVAL HPI: She believes she is in a house, believes that is 1996, believes the current president is Yuriy Giselle.  She is also surprised to learn that she had a pacemaker implanted yesterday, believes that it was just revised.  I could discern no meaningful history out of her otherwise.    MEDICATIONS:  [Held by provider] allopurinol, 300 mg, oral, Daily  amiodarone, 200 mg, oral, Daily  aspirin, 81 mg, oral, Daily  atorvastatin, 10 mg, oral, Nightly  [Held by provider] enoxaparin, 40 mg, subcutaneous, Daily  melatonin, 3 mg, oral, Daily  metoprolol succinate XL, 75 mg, oral, BID  OLANZapine zydis, 2.5 mg, oral, Nightly  polyethylene glycol, 17 g, oral, Daily  [Held by provider] potassium chloride, 20 mEq, oral, BID  sennosides-docusate sodium, 1 tablet, oral, BID       sodium chloride 0.9%, 75 mL/hr, Last Rate: 100 mL/hr (08/01/24 0126)       PRN medications: acetaminophen, bisacodyl, dextrose, dextrose, glucagon, glucagon, hydrOXYzine HCL, OLANZapine, OLANZapine, oxygen     OBJECTIVE  PHYSICAL EXAM:   Heart Rate:  [56-80]   Temp:  [36 °C (96.8 °F)-36.4 °C (97.5 °F)]   Resp:  [14-20]   BP:  ()/()   Weight:  [91.3 kg (201 lb 4.5 oz)-91.6 kg (201 lb 15.1 oz)]   SpO2:  [94 %-100 %]   Body mass index is 31.63 kg/m².  This is an elderly black woman who appears in no acute distress  No obvious skin rashes  Hearing intact  Phonation intact  Moist mucosa  Newly implanted left upper chest pacemaker under bandage  Lungs are clear to auscultation bilaterally  Abdomen is soft, nondistended, nontender, positive bowel sounds  No Orellana catheter in place, no suprapubic tenderness to palpation  No extremity edema  Moves 4 limbs spontaneously  No obvious joint deformities  No lymphadenopathy  Somewhat confused affect    DATA:   Labs:  Results for orders placed or performed during the hospital encounter of 07/19/24 (from the past 96 hour(s))   Basic metabolic panel   Result Value Ref Range    Glucose 118 (H) 65 - 99 mg/dL    Sodium 140 133 - 145 mmol/L    Potassium 4.4 3.4 - 5.1 mmol/L    Chloride 103 97 - 107 mmol/L    Bicarbonate 20 (L) 24 - 31 mmol/L    Urea Nitrogen 20 8 - 25 mg/dL    Creatinine 1.20 0.40 - 1.60 mg/dL    eGFR 48 (L) >60 mL/min/1.73m*2    Calcium 9.2 8.5 - 10.4 mg/dL    Anion Gap 17 <=19 mmol/L   CBC and Auto Differential   Result Value Ref Range    WBC 12.9 (H) 4.4 - 11.3 x10*3/uL    nRBC 0.0 0.0 - 0.0 /100 WBCs    RBC 4.64 4.00 - 5.20 x10*6/uL    Hemoglobin 12.7 12.0 - 16.0 g/dL    Hematocrit 40.3 36.0 - 46.0 %    MCV 87 80 - 100 fL    MCH 27.4 26.0 - 34.0 pg    MCHC 31.5 (L) 32.0 - 36.0 g/dL    RDW 16.1 (H) 11.5 - 14.5 %    Platelets 220 150 - 450 x10*3/uL    Neutrophils % 76.4 40.0 - 80.0 %    Immature Granulocytes %, Automated 1.1 (H) 0.0 - 0.9 %    Lymphocytes % 12.2 13.0 - 44.0 %    Monocytes % 8.3 2.0 - 10.0 %    Eosinophils % 1.5 0.0 - 6.0 %    Basophils % 0.5 0.0 - 2.0 %    Neutrophils Absolute 9.89 (H) 1.60 - 5.50 x10*3/uL    Immature Granulocytes Absolute, Automated 0.14 0.00 - 0.50 x10*3/uL    Lymphocytes Absolute 1.58 0.80 - 3.00 x10*3/uL    Monocytes Absolute 1.07 (H) 0.05 -  0.80 x10*3/uL    Eosinophils Absolute 0.19 0.00 - 0.40 x10*3/uL    Basophils Absolute 0.07 0.00 - 0.10 x10*3/uL   Magnesium   Result Value Ref Range    Magnesium 2.00 1.60 - 3.10 mg/dL   Phosphorus   Result Value Ref Range    Phosphorus 3.3 2.5 - 4.5 mg/dL   Basic metabolic panel   Result Value Ref Range    Glucose 101 (H) 65 - 99 mg/dL    Sodium 141 133 - 145 mmol/L    Potassium 4.1 3.4 - 5.1 mmol/L    Chloride 105 97 - 107 mmol/L    Bicarbonate 22 (L) 24 - 31 mmol/L    Urea Nitrogen 21 8 - 25 mg/dL    Creatinine 1.30 0.40 - 1.60 mg/dL    eGFR 44 (L) >60 mL/min/1.73m*2    Calcium 8.8 8.5 - 10.4 mg/dL    Anion Gap 14 <=19 mmol/L   CBC and Auto Differential   Result Value Ref Range    WBC 10.9 4.4 - 11.3 x10*3/uL    nRBC 0.0 0.0 - 0.0 /100 WBCs    RBC 4.20 4.00 - 5.20 x10*6/uL    Hemoglobin 11.6 (L) 12.0 - 16.0 g/dL    Hematocrit 36.4 36.0 - 46.0 %    MCV 87 80 - 100 fL    MCH 27.6 26.0 - 34.0 pg    MCHC 31.9 (L) 32.0 - 36.0 g/dL    RDW 15.9 (H) 11.5 - 14.5 %    Platelets 213 150 - 450 x10*3/uL    Neutrophils % 72.8 40.0 - 80.0 %    Immature Granulocytes %, Automated 1.2 (H) 0.0 - 0.9 %    Lymphocytes % 15.2 13.0 - 44.0 %    Monocytes % 8.4 2.0 - 10.0 %    Eosinophils % 1.8 0.0 - 6.0 %    Basophils % 0.6 0.0 - 2.0 %    Neutrophils Absolute 7.92 (H) 1.60 - 5.50 x10*3/uL    Immature Granulocytes Absolute, Automated 0.13 0.00 - 0.50 x10*3/uL    Lymphocytes Absolute 1.66 0.80 - 3.00 x10*3/uL    Monocytes Absolute 0.92 (H) 0.05 - 0.80 x10*3/uL    Eosinophils Absolute 0.20 0.00 - 0.40 x10*3/uL    Basophils Absolute 0.06 0.00 - 0.10 x10*3/uL   Magnesium   Result Value Ref Range    Magnesium 2.00 1.60 - 3.10 mg/dL   Phosphorus   Result Value Ref Range    Phosphorus 3.9 2.5 - 4.5 mg/dL   Basic metabolic panel   Result Value Ref Range    Glucose 105 (H) 65 - 99 mg/dL    Sodium 139 133 - 145 mmol/L    Potassium 4.8 3.4 - 5.1 mmol/L    Chloride 105 97 - 107 mmol/L    Bicarbonate 23 (L) 24 - 31 mmol/L    Urea Nitrogen 34 (H) 8 -  25 mg/dL    Creatinine 2.00 (H) 0.40 - 1.60 mg/dL    eGFR 26 (L) >60 mL/min/1.73m*2    Calcium 8.9 8.5 - 10.4 mg/dL    Anion Gap 11 <=19 mmol/L   CBC and Auto Differential   Result Value Ref Range    WBC 11.5 (H) 4.4 - 11.3 x10*3/uL    nRBC 0.0 0.0 - 0.0 /100 WBCs    RBC 4.12 4.00 - 5.20 x10*6/uL    Hemoglobin 11.2 (L) 12.0 - 16.0 g/dL    Hematocrit 36.5 36.0 - 46.0 %    MCV 89 80 - 100 fL    MCH 27.2 26.0 - 34.0 pg    MCHC 30.7 (L) 32.0 - 36.0 g/dL    RDW 16.1 (H) 11.5 - 14.5 %    Platelets 212 150 - 450 x10*3/uL    Neutrophils % 70.6 40.0 - 80.0 %    Immature Granulocytes %, Automated 0.8 0.0 - 0.9 %    Lymphocytes % 17.4 13.0 - 44.0 %    Monocytes % 8.3 2.0 - 10.0 %    Eosinophils % 2.4 0.0 - 6.0 %    Basophils % 0.5 0.0 - 2.0 %    Neutrophils Absolute 8.13 (H) 1.60 - 5.50 x10*3/uL    Immature Granulocytes Absolute, Automated 0.09 0.00 - 0.50 x10*3/uL    Lymphocytes Absolute 2.01 0.80 - 3.00 x10*3/uL    Monocytes Absolute 0.96 (H) 0.05 - 0.80 x10*3/uL    Eosinophils Absolute 0.28 0.00 - 0.40 x10*3/uL    Basophils Absolute 0.06 0.00 - 0.10 x10*3/uL   Magnesium   Result Value Ref Range    Magnesium 2.40 1.60 - 3.10 mg/dL   Phosphorus   Result Value Ref Range    Phosphorus 4.4 2.5 - 4.5 mg/dL   Basic metabolic panel   Result Value Ref Range    Glucose 101 (H) 65 - 99 mg/dL    Sodium 141 133 - 145 mmol/L    Potassium 4.5 3.4 - 5.1 mmol/L    Chloride 105 97 - 107 mmol/L    Bicarbonate 23 (L) 24 - 31 mmol/L    Urea Nitrogen 34 (H) 8 - 25 mg/dL    Creatinine 2.00 (H) 0.40 - 1.60 mg/dL    eGFR 26 (L) >60 mL/min/1.73m*2    Calcium 8.6 8.5 - 10.4 mg/dL    Anion Gap 13 <=19 mmol/L   CBC and Auto Differential   Result Value Ref Range    WBC 12.2 (H) 4.4 - 11.3 x10*3/uL    nRBC 0.0 0.0 - 0.0 /100 WBCs    RBC 4.17 4.00 - 5.20 x10*6/uL    Hemoglobin 11.3 (L) 12.0 - 16.0 g/dL    Hematocrit 35.9 (L) 36.0 - 46.0 %    MCV 86 80 - 100 fL    MCH 27.1 26.0 - 34.0 pg    MCHC 31.5 (L) 32.0 - 36.0 g/dL    RDW 15.8 (H) 11.5 - 14.5 %     Platelets 195 150 - 450 x10*3/uL    Neutrophils % 78.7 40.0 - 80.0 %    Immature Granulocytes %, Automated 0.7 0.0 - 0.9 %    Lymphocytes % 11.6 13.0 - 44.0 %    Monocytes % 6.5 2.0 - 10.0 %    Eosinophils % 2.0 0.0 - 6.0 %    Basophils % 0.5 0.0 - 2.0 %    Neutrophils Absolute 9.63 (H) 1.60 - 5.50 x10*3/uL    Immature Granulocytes Absolute, Automated 0.08 0.00 - 0.50 x10*3/uL    Lymphocytes Absolute 1.42 0.80 - 3.00 x10*3/uL    Monocytes Absolute 0.80 0.05 - 0.80 x10*3/uL    Eosinophils Absolute 0.25 0.00 - 0.40 x10*3/uL    Basophils Absolute 0.06 0.00 - 0.10 x10*3/uL   Renal Function Panel   Result Value Ref Range    Glucose 97 65 - 99 mg/dL    Sodium 140 133 - 145 mmol/L    Potassium 4.0 3.4 - 5.1 mmol/L    Chloride 106 97 - 107 mmol/L    Bicarbonate 20 (L) 24 - 31 mmol/L    Urea Nitrogen 31 (H) 8 - 25 mg/dL    Creatinine 1.70 (H) 0.40 - 1.60 mg/dL    eGFR 32 (L) >60 mL/min/1.73m*2    Calcium 8.6 8.5 - 10.4 mg/dL    Phosphorus 3.6 2.5 - 4.5 mg/dL    Albumin 3.3 (L) 3.5 - 5.0 g/dL    Anion Gap 14 <=19 mmol/L         SIGNATURE: Jered Poole MD PATIENT NAME: Nilam Powell   DATE: August 1, 2024 MRN: [id number]   TIME: 10:45 AM PAGER: [phone]

## 2024-08-01 NOTE — PROGRESS NOTES
Physical Therapy    Physical Therapy Treatment    Patient Name: Nilam Powell  MRN: [id number]  Today's Date: 8/1/2024  Time Calculation  Start Time: 1310  Stop Time: 1336  Time Calculation (min): 26 min    Assessment/Plan   PT Assessment  PT Assessment Results: Decreased strength, Decreased range of motion, Decreased endurance, Impaired balance, Decreased mobility, Decreased coordination, Decreased cognition, Impaired judgement, Decreased safety awareness, Pain, Obesity  Rehab Prognosis: Good  Evaluation/Treatment Tolerance: Patient tolerated treatment well  End of Session Communication: Bedside nurse  Assessment Comment: pt progressing well; pt participatory; improved overall mobility today; MIN/MOD A for transfers and gait.  End of Session Patient Position: Up in chair, Alarm on (call button in reach)  PT Plan  Inpatient/Swing Bed or Outpatient: Inpatient  PT Plan  Treatment/Interventions: Bed mobility, Transfer training, Gait training, Stair training, Balance training, Neuromuscular re-education, Strengthening, Endurance training, Range of motion, Therapeutic exercise, Therapeutic activity  PT Plan: Ongoing PT  PT Frequency: 6 times per week  PT Discharge Recommendations: Moderate intensity level of continued care  Equipment Recommended upon Discharge: Wheeled walker  PT Recommended Transfer Status:  (MIN/MOD A)  PT - OK to Discharge: Yes      General Visit Information:   PT  Visit  PT Received On: 08/01/24  General  Reason for Referral: s/p cardiac arrest requiring intubation/ extubation, AMS; impaired mobility  Past Medical History Relevant to Rehab: HTN, DLP, gout, uterine fibroid, Colonoscopy, hysterectomy  Prior to Session Communication: Bedside nurse  Patient Position Received: Bed, 3 rail up  General Comment: pt cooperative and but needed encouragement to get OOB;    Subjective   Precautions:  Precautions  Hearing/Visual Limitations: Glasses  Medical Precautions: Fall precautions  Precautions Comment:  pacemaker precautions    Vital Signs:  Vital Signs  Heart Rate: 77  Resp:  (17-24 bpm)    Objective   Pain:  Pain Assessment  Pain Assessment:  (0/10)    Cognition:  Cognition  Overall Cognitive Status: Impaired  Following Commands: Follows multistep commands with increased time  Processing Speed: Delayed    Coordination:  Coordination Comment: short B step length    Postural Control:  Postural Control  Posture Comment: forward flexed head down posture      Activity Tolerance:  Activity Tolerance  Endurance:  (GOOD-/FAIR+ activity tolerance)      Treatments:  Therapeutic Exercise Performed:  (B LE AROM therex: AP, GS, QS, HS, ABD, LAQ, calf raises and hip flexion x 10-15 reps. VC given to stay on task)         Bed Mobility:  supine to sit with MOD A for trunk up, B LE and scooting to EOB. pt given seated rest break due to mild SHERMAN/fatigue.      Ambulation/Gait Training Performed:  pt amb 6' x 1 using rolling walker with MIN A x 1-2, pt presented with rapid pradeep and short B step length. assisted pt into bathroom. after tolieting, pt amb additional 15' x 1 using RW with MIN A x 1-2; R LE externally rotated with mild lateral sway noted. Frequent VC given to stay inside base of RW and to slow down pace.       Transfer:  sit to stand with MIN A; pt asked to use bathroom; stand to sit with MOD A and VC for safe transfer onto commode. pt trying to sit too soon after 2nd gait trial pt sat up in chair with MIN A and good B arm placement.          Outcome Measures:  [facility] Basic Mobility  Turning from your back to your side while in a flat bed without using bedrails: A lot  Moving from lying on your back to sitting on the side of a flat bed without using bedrails: A lot  Moving to and from bed to chair (including a wheelchair): A little  Standing up from a chair using your arms (e.g. wheelchair or bedside chair): A little  To walk in hospital room: A little  Climbing 3-5 steps with railing: A lot  Basic Mobility - Total  Score: 15           Encounter Problems       Encounter Problems (Active)       Balance       STG - Maintains static standing balance with upper extremity support and min assist x 1. (Progressing)       Start:  07/22/24    Expected End:  08/22/24       INTERVENTIONS:  1. Practice standing with minimal support.  2. Educate patient about standing tolerance.  3. Educate patient about independence with gait, transfers, and ADL's.  4. Educate patient about use of assistive device.  5. Educate patient about self-directed care.         STG - Maintains static sitting balance with upper extremity support and supervision for safety. (Progressing)       Start:  07/22/24    Expected End:  08/22/24       INTERVENTIONS:  1. Practice sitting on the edge of a bed/mat with minimal support.  2. Educate patient about maintining total hip precautions while maintaining balance.  3. Educate patient about pressure relief.  4. Educate patient about use of assistive device.            Mobility       STG - Patient will ambulate 50' with use of rolling walker and min assist x 1. (Progressing)       Start:  07/22/24    Expected End:  08/22/24            STG - Patient will ambulate up and down a curb/step with min assist x 1. (Progressing)       Start:  07/22/24    Expected End:  08/22/24               PT Transfers       STG - Patient to transfer to and from sit to supine with supervision for safety. (Progressing)       Start:  07/22/24    Expected End:  08/22/24            STG - Patient will transfer sit to and from stand with use of rolling walker and min assist x 1. (Progressing)       Start:  07/22/24    Expected End:  08/22/24               Pain - Adult          Safety       STG - Patient uses call light consistently to request assistance with transfers (Progressing)       Start:  07/22/24    Expected End:  08/22/24

## 2024-08-01 NOTE — NURSING NOTE
Assumed pt care 0700. Pt was asleep I bed. Pt plan to go for chest xray and reevaluate pacemaker placed on 07/31. Will follow up w/ results. Expects to be discharged to SNF, waiting for  choices to make referrals. Pt is resting comfortably, pleasantly confused. Bed alarm on, bed on lowest , locked position and call light within reach. Continuing to monitor.

## 2024-08-01 NOTE — NURSING NOTE
Pt educated on wearing sling. All attempts were unsuccessful to replace sling. Pt refuses to keep sling on left arm.

## 2024-08-01 NOTE — CARE PLAN
The patient's goals for the shift include      The clinical goals for the shift include remain fall free    Over the shift, the patient did not make progress toward the following goals. Barriers to progression include   Problem: Knowledge Deficit  Goal: Patient/family/caregiver demonstrates understanding of disease process, treatment plan, medications, and discharge instructions  Outcome: Progressing     Problem: Pain - Adult  Goal: Verbalizes/displays adequate comfort level or baseline comfort level  Outcome: Progressing     Problem: Safety - Adult  Goal: Free from fall injury  Outcome: Progressing     Problem: Discharge Planning  Goal: Discharge to home or other facility with appropriate resources  Outcome: Progressing     Problem: Chronic Conditions and Co-morbidities  Goal: Patient's chronic conditions and co-morbidity symptoms are monitored and maintained or improved  Outcome: Progressing     Problem: Skin  Goal: Decreased wound size/increased tissue granulation at next dressing change  Outcome: Progressing  Flowsheets (Taken 8/1/2024 0122)  Decreased wound size/increased tissue granulation at next dressing change: Promote sleep for wound healing  Goal: Participates in plan/prevention/treatment measures  Outcome: Progressing  Flowsheets (Taken 8/1/2024 0122)  Participates in plan/prevention/treatment measures: Elevate heels  Goal: Prevent/manage excess moisture  Outcome: Progressing  Flowsheets (Taken 8/1/2024 0122)  Prevent/manage excess moisture: Cleanse incontinence/protect with barrier cream  Goal: Prevent/minimize sheer/friction injuries  Outcome: Progressing  Flowsheets (Taken 8/1/2024 0122)  Prevent/minimize sheer/friction injuries: Turn/reposition every 2 hours/use positioning/transfer devices  Goal: Promote/optimize nutrition  Outcome: Progressing  Flowsheets (Taken 8/1/2024 0122)  Promote/optimize nutrition: Assist with feeding  Goal: Promote skin healing  Outcome: Progressing  Flowsheets (Taken  8/1/2024 0122)  Promote skin healing: Assess skin/pad under line(s)/device(s)     Problem: Nutrition  Goal: Less than 5 days NPO/clear liquids  Outcome: Progressing  Goal: Oral intake greater than 50%  Outcome: Progressing  Goal: Oral intake greater 75%  Outcome: Progressing  Goal: Consume prescribed supplement  Outcome: Progressing  Goal: Adequate PO fluid intake  Outcome: Progressing  Goal: Nutrition support goals are met within 48 hrs  Outcome: Progressing  Goal: Nutrition support is meeting 75% of nutrient needs  Outcome: Progressing  Goal: Tube feed tolerance  Outcome: Progressing  Goal: BG  mg/dL  Outcome: Progressing  Goal: Lab values WNL  Outcome: Progressing  Goal: Electrolytes WNL  Outcome: Progressing  Goal: Promote healing  Outcome: Progressing  Goal: Maintain stable weight  Outcome: Progressing     Problem: Fall/Injury  Goal: Not fall by end of shift  Outcome: Progressing  Goal: Be free from injury by end of the shift  Outcome: Progressing  Goal: Verbalize understanding of personal risk factors for fall in the hospital  Outcome: Progressing  Goal: Verbalize understanding of risk factor reduction measures to prevent injury from fall in the home  Outcome: Progressing  Goal: Use assistive devices by end of the shift  Outcome: Progressing  Goal: Pace activities to prevent fatigue by end of the shift  Outcome: Progressing     Problem: Risk for falls  Goal: I will remain free from falls  Outcome: Progressing     Problem: ADLs  Goal: Pt will complete ADL tasks at mod I with use of AE prn   Outcome: Progressing   . Recommendations to address these barriers include .

## 2024-08-01 NOTE — PROGRESS NOTES
"Nilam Powell is a 73 y.o. female on day 13 of admission presenting with Cardiac arrest (Multi).    Subjective    Looks well, no complaints, answers questions appropriate  Objective   Status post ICD implantation yesterday for secondary prevention presented to the hospital in the setting of VF arrest    Physical Exam  Gen: A+O x 3, no acute distress  HEENT: Normocephalic/atraumatic pupils equal react light  Neck: No JVD, upstrokes and volumes nl, no bruits   Lung: CTA, nl AP diameter  CV:  nl sounding S1, S2, no murmur, PMI nondisplaced, prepectoral incision site intact with dressing no hematoma,  Abdomen: soft, non tender, + BS x 4   Extremities: warm to touch, palpable pulses bilaterally, no edema   Neuro: no neurologic deficits  Last Recorded Vitals    Blood pressure 106/90, pulse 76, temperature 36 °C (96.8 °F), temperature source Temporal, resp. rate 20, height 1.702 m (5' 7\"), weight 91.6 kg (201 lb 15.1 oz), SpO2 99%.  Intake/Output last 3 Shifts:  I/O last 3 completed shifts:  In: 1100 (12 mL/kg) [I.V.:1000 (10.9 mL/kg); IV Piggyback:100]  Out: 105 (1.1 mL/kg) [Urine:100 (0 mL/kg/hr); Blood:5]  Weight: 91.6 kg     Relevant Results  Results for orders placed or performed during the hospital encounter of 07/19/24 (from the past 24 hour(s))   CBC and Auto Differential   Result Value Ref Range    WBC 12.2 (H) 4.4 - 11.3 x10*3/uL    nRBC 0.0 0.0 - 0.0 /100 WBCs    RBC 4.17 4.00 - 5.20 x10*6/uL    Hemoglobin 11.3 (L) 12.0 - 16.0 g/dL    Hematocrit 35.9 (L) 36.0 - 46.0 %    MCV 86 80 - 100 fL    MCH 27.1 26.0 - 34.0 pg    MCHC 31.5 (L) 32.0 - 36.0 g/dL    RDW 15.8 (H) 11.5 - 14.5 %    Platelets 195 150 - 450 x10*3/uL    Neutrophils % 78.7 40.0 - 80.0 %    Immature Granulocytes %, Automated 0.7 0.0 - 0.9 %    Lymphocytes % 11.6 13.0 - 44.0 %    Monocytes % 6.5 2.0 - 10.0 %    Eosinophils % 2.0 0.0 - 6.0 %    Basophils % 0.5 0.0 - 2.0 %    Neutrophils Absolute 9.63 (H) 1.60 - 5.50 x10*3/uL    Immature Granulocytes " Absolute, Automated 0.08 0.00 - 0.50 x10*3/uL    Lymphocytes Absolute 1.42 0.80 - 3.00 x10*3/uL    Monocytes Absolute 0.80 0.05 - 0.80 x10*3/uL    Eosinophils Absolute 0.25 0.00 - 0.40 x10*3/uL    Basophils Absolute 0.06 0.00 - 0.10 x10*3/uL   Renal Function Panel   Result Value Ref Range    Glucose 97 65 - 99 mg/dL    Sodium 140 133 - 145 mmol/L    Potassium 4.0 3.4 - 5.1 mmol/L    Chloride 106 97 - 107 mmol/L    Bicarbonate 20 (L) 24 - 31 mmol/L    Urea Nitrogen 31 (H) 8 - 25 mg/dL    Creatinine 1.70 (H) 0.40 - 1.60 mg/dL    eGFR 32 (L) >60 mL/min/1.73m*2    Calcium 8.6 8.5 - 10.4 mg/dL    Phosphorus 3.6 2.5 - 4.5 mg/dL    Albumin 3.3 (L) 3.5 - 5.0 g/dL    Anion Gap 14 <=19 mmol/L   Renal Function Panel   Result Value Ref Range    Glucose 107 (H) 65 - 99 mg/dL    Sodium 140 133 - 145 mmol/L    Potassium 4.2 3.4 - 5.1 mmol/L    Chloride 106 97 - 107 mmol/L    Bicarbonate 21 (L) 24 - 31 mmol/L    Urea Nitrogen 27 (H) 8 - 25 mg/dL    Creatinine 1.60 0.40 - 1.60 mg/dL    eGFR 34 (L) >60 mL/min/1.73m*2    Calcium 8.7 8.5 - 10.4 mg/dL    Phosphorus 3.4 2.5 - 4.5 mg/dL    Albumin 3.2 (L) 3.5 - 5.0 g/dL    Anion Gap 13 <=19 mmol/L   Cardiac Device Check - Remote   Result Value Ref Range    BSA 2.08 m2       Assessment/Plan   Principal Problem:    Cardiac arrest (Multi)  Active Problems:    Cardiac arrest with ventricular fibrillation (Multi)    8/1: Presented 7/19 VF arrest, anoxic brain injury through the course anatomy without any obstructive lesions, echo preserved left ventricular function electrolytes within normal limits, confused mental status though has been improving over the past few days, some renal injury over the past few days with low blood pressures responding to IV fluids and adjustment of medications    POD #1 Received dual-chamber ICD yesterday for secondary prevention   -C/w low dose amiodarone and bb as BP permits  -Renal function 2.0, today 1.6, amlodipine discontinued, sys bp 110-120   CXR pending,  device interrogation WNL  -Appt and device instructions in chart   -DC to Snf when able      I spent 30 minutes in the professional and overall care of this patient.      Grace Russ, APRN-CNP

## 2024-08-01 NOTE — NURSING NOTE
Repositioned and changed pt under pads. HR elevated 120 and afib on monitor at this time. Pt asymptomatic, sitting up in bed

## 2024-08-01 NOTE — PROGRESS NOTES
Occupational Therapy    OT Treatment    Patient Name: Nilam Powell  MRN: [id number]  Today's Date: 8/1/2024  Time Calculation  Start Time: 1458  Stop Time: 1522  Time Calculation (min): 24 min        Assessment:  OT Assessment: Gradual progress made towards OT goals. Continue with current OT POC to increase strength, balance and functional tolerance to maximize safety and independence during ADLs.  Barriers to Discharge: Inaccessible home environment, Decreased caregiver support  Evaluation/Treatment Tolerance: Treatment limited secondary to agitation  End of Session Communication: Bedside nurse  End of Session Patient Position: Bed, 3 rail up, Alarm on (all needs in reach)  OT Assessment Results: Decreased ADL status, Decreased cognition, Decreased endurance, Decreased safe judgment during ADL, Decreased functional mobility, Decreased trunk control for functional activities  Barriers to Discharge: Inaccessible home environment, Decreased caregiver support  Evaluation/Treatment Tolerance: Treatment limited secondary to agitation  Plan:  Treatment Interventions: ADL retraining, Functional transfer training, UE strengthening/ROM, Endurance training, Patient/family training, Equipment evaluation/education, Compensatory technique education  OT Frequency: 4 times per week  OT Discharge Recommendations: Moderate intensity level of continued care, 24 hr supervision due to cognition  Equipment Recommended upon Discharge: Wheeled walker  OT Recommended Transfer Status: Assist of 1, Assist of 2  OT - OK to Discharge: Yes  Treatment Interventions: ADL retraining, Functional transfer training, UE strengthening/ROM, Endurance training, Patient/family training, Equipment evaluation/education, Compensatory technique education    Subjective   Previous Visit Info:  OT Last Visit  OT Received On: 08/01/24  General:  General  Reason for Referral: s/p cardiac arrest requiring intubation/ extubation, AMS; impaired ADLs, s/p pacemaker  placement  Past Medical History Relevant to Rehab: HTN, DLP, gout, uterine fibroid, Colonoscopy, hysterectomy  Prior to Session Communication: Bedside nurse  Patient Position Received: Up in chair, Alarm on  General Comment: Pt cleared for OT session per nursing, cooperative with encouragement. Significantly increased time required for task completion secondary to pt reluctance to assistance/ recommendations. Pt is a high risk of falls.  Precautions:  Hearing/Visual Limitations: Glasses  Medical Precautions: Fall precautions  Post-Surgical Precautions: Other (comment) (pacemaker precautions)  Vital Signs:     Pain:  Pain Assessment  Pain Assessment: 0-10  0-10 (Numeric) Pain Score: 0 - No pain  Clinical Progression: Not changed    Objective    Cognition:  Cognition  Overall Cognitive Status: Impaired  Orientation Level: Disoriented to place, Disoriented to time, Disoriented to situation  Safety Judgment: Decreased awareness of need for assistance  Safety/Judgement: Exceptions to WFL  Insight: Moderate  Impulsive: Moderately  Task Initiation: Initiates with cues  Processing Speed: Delayed  Coordination:  Movements are Fluid and Coordinated: No  Upper Body Coordination: slower rate of movements  Lower Body Coordination: slower rate of movements  Activities of Daily Living: Grooming  Grooming Comments: hand hygiene completed standing at sink with FWW and CGA- verbal/ tactile cues required for AD management to increase safety and decrease risk of falls. Poor carry-over observed.    UE Bathing  UE Bathing Comments: pt declined participation in bathing tasks despite education and encouragement.    Toileting  Toileting Comments: anterior hygiene tasks completed in supported standing with FWW and CGA  Functional Standing Tolerance:     Bed Mobility/Transfers: Bed Mobility  Bed Mobility: Yes  Bed Mobility 1  Bed Mobility 1: Sitting to supine  Level of Assistance 1: Moderate assistance  Bed Mobility Comments 1: for BLE  management    Transfers  Transfer: Yes  Transfer 1  Trials/Comments 1: sit<>stands completed from standard chair and toilet heights with FWW and Radha- verbal/ tactile cues required for proper hand placement to increase safety and decrease risk of falls. Poor carry-over observed.    Toilet Transfers  Toilet Transfer From: Chair  Toilet Transfer Type: To and from  Toilet Transfer to: Standard toilet  Toilet Transfer Technique: Ambulating  Toilet Transfers: Contact guard  Toilet Transfers Comments: with FWW    Functional Mobility:  Functional Mobility  Functional Mobility Performed: Yes  Functional Mobility 1  Surface 1: Level tile  Device 1: Rolling walker  Assistance 1: Contact guard  Quality of Functional Mobility 1: Inconsistent stride length  Comments 1: Functional mobility trials completed at minimal household distances to increase functional tolerance and assess safety awareness to increase overall safety and independence prior to home-going. Decreased safety awareness observed this date.      Outcome Measures:[facility] Daily Activity  Putting on and taking off regular lower body clothing: A lot  Bathing (including washing, rinsing, drying): A lot  Putting on and taking off regular upper body clothing: A little  Toileting, which includes using toilet, bedpan or urinal: A lot  Taking care of personal grooming such as brushing teeth: A little  Eating Meals: A little  Daily Activity - Total Score: 15        Education Documentation  Body Mechanics, taught by LINN Walls at 8/1/2024  5:35 PM.  Learner: Patient  Readiness: Nonacceptance  Method: Explanation, Demonstration  Response: Needs Reinforcement    ADL Training, taught by LINN Walls at 8/1/2024  5:35 PM.  Learner: Patient  Readiness: Nonacceptance  Method: Explanation, Demonstration  Response: Needs Reinforcement    Education Comments  Education provided on role of OT/POC, safety awareness throughout functional tasks/transfers, importance of  activity/ rest routine, EC/WS techniques, and use of call light for assistance. Questions, comments and concerns addressed regarding OT.      Goals:  Encounter Problems       Encounter Problems (Active)       ADLs       Pt will complete ADL tasks at mod I with use of AE prn  (Progressing)       Start:  07/22/24    Expected End:  08/01/24               Functional Mobility       Pt will perform functional mobility household distances at mod I with use of RW.    (Progressing)       Start:  07/22/24    Expected End:  08/26/24               OT Transfers       Pt will perform BUE exercises to improve strength and independence with functional transfers/ADL tasks.   (Progressing)       Start:  07/22/24    Expected End:  08/26/24

## 2024-08-01 NOTE — DISCHARGE INSTRUCTIONS
Post Pacemaker Instructions: Please leave dressing on for total a 7 days then may remove, there are Steri-Strips underneath, please allow to dry and fall off naturally or will be removed at the time of your office appointment.    No driving 5 days, you may shower with current dressing please do not soak the site, lifting restrictions: nothing >10 to 12 pounds over affected shoulder for 4-6 weeks, avoid repetitive type activity, for ex: golfing, bowling, swimming for 4-6 wks. Please call with any changes in incision site, new tenderness, pain or redness         Please check BMP in 1 week to monitor renal function. Baseline serum creatinine 0.9. Most recent creatinine 1.6 (8/2/24), if worsening on repeat BMP, please have patient follow up with Dr. Jered Poole.

## 2024-08-02 VITALS
HEART RATE: 71 BPM | SYSTOLIC BLOOD PRESSURE: 125 MMHG | RESPIRATION RATE: 24 BRPM | WEIGHT: 202.6 LBS | OXYGEN SATURATION: 92 % | DIASTOLIC BLOOD PRESSURE: 72 MMHG | TEMPERATURE: 97.5 F | HEIGHT: 67 IN | BODY MASS INDEX: 31.8 KG/M2

## 2024-08-02 LAB
ALBUMIN SERPL-MCNC: 3.4 G/DL (ref 3.5–5)
ANION GAP SERPL CALC-SCNC: 13 MMOL/L
BASOPHILS # BLD AUTO: 0.06 X10*3/UL (ref 0–0.1)
BASOPHILS NFR BLD AUTO: 0.5 %
BUN SERPL-MCNC: 25 MG/DL (ref 8–25)
CALCIUM SERPL-MCNC: 9.3 MG/DL (ref 8.5–10.4)
CHLORIDE SERPL-SCNC: 106 MMOL/L (ref 97–107)
CO2 SERPL-SCNC: 22 MMOL/L (ref 24–31)
CREAT SERPL-MCNC: 1.6 MG/DL (ref 0.4–1.6)
EGFRCR SERPLBLD CKD-EPI 2021: 34 ML/MIN/1.73M*2
EOSINOPHIL # BLD AUTO: 0.27 X10*3/UL (ref 0–0.4)
EOSINOPHIL NFR BLD AUTO: 2.2 %
ERYTHROCYTE [DISTWIDTH] IN BLOOD BY AUTOMATED COUNT: 15.9 % (ref 11.5–14.5)
GLUCOSE SERPL-MCNC: 94 MG/DL (ref 65–99)
HCT VFR BLD AUTO: 37.2 % (ref 36–46)
HGB BLD-MCNC: 11.6 G/DL (ref 12–16)
HOLD SPECIMEN: NORMAL
IMM GRANULOCYTES # BLD AUTO: 0.07 X10*3/UL (ref 0–0.5)
IMM GRANULOCYTES NFR BLD AUTO: 0.6 % (ref 0–0.9)
LYMPHOCYTES # BLD AUTO: 1.43 X10*3/UL (ref 0.8–3)
LYMPHOCYTES NFR BLD AUTO: 11.8 %
MCH RBC QN AUTO: 27.4 PG (ref 26–34)
MCHC RBC AUTO-ENTMCNC: 31.2 G/DL (ref 32–36)
MCV RBC AUTO: 88 FL (ref 80–100)
MONOCYTES # BLD AUTO: 0.82 X10*3/UL (ref 0.05–0.8)
MONOCYTES NFR BLD AUTO: 6.8 %
NEUTROPHILS # BLD AUTO: 9.46 X10*3/UL (ref 1.6–5.5)
NEUTROPHILS NFR BLD AUTO: 78.1 %
NRBC BLD-RTO: 0 /100 WBCS (ref 0–0)
PHOSPHATE SERPL-MCNC: 3.2 MG/DL (ref 2.5–4.5)
PLATELET # BLD AUTO: 188 X10*3/UL (ref 150–450)
POTASSIUM SERPL-SCNC: 4 MMOL/L (ref 3.4–5.1)
RBC # BLD AUTO: 4.23 X10*6/UL (ref 4–5.2)
SODIUM SERPL-SCNC: 141 MMOL/L (ref 133–145)
WBC # BLD AUTO: 12.1 X10*3/UL (ref 4.4–11.3)

## 2024-08-02 PROCEDURE — 2500000002 HC RX 250 W HCPCS SELF ADMINISTERED DRUGS (ALT 637 FOR MEDICARE OP, ALT 636 FOR OP/ED): Performed by: INTERNAL MEDICINE

## 2024-08-02 PROCEDURE — 99239 HOSP IP/OBS DSCHRG MGMT >30: CPT | Performed by: STUDENT IN AN ORGANIZED HEALTH CARE EDUCATION/TRAINING PROGRAM

## 2024-08-02 PROCEDURE — 36415 COLL VENOUS BLD VENIPUNCTURE: CPT | Performed by: STUDENT IN AN ORGANIZED HEALTH CARE EDUCATION/TRAINING PROGRAM

## 2024-08-02 PROCEDURE — 2500000001 HC RX 250 WO HCPCS SELF ADMINISTERED DRUGS (ALT 637 FOR MEDICARE OP): Performed by: INTERNAL MEDICINE

## 2024-08-02 PROCEDURE — 85025 COMPLETE CBC W/AUTO DIFF WBC: CPT | Performed by: INTERNAL MEDICINE

## 2024-08-02 PROCEDURE — 80069 RENAL FUNCTION PANEL: CPT | Performed by: STUDENT IN AN ORGANIZED HEALTH CARE EDUCATION/TRAINING PROGRAM

## 2024-08-02 PROCEDURE — 97535 SELF CARE MNGMENT TRAINING: CPT | Mod: GO,CO

## 2024-08-02 PROCEDURE — 2500000004 HC RX 250 GENERAL PHARMACY W/ HCPCS (ALT 636 FOR OP/ED): Performed by: INTERNAL MEDICINE

## 2024-08-02 PROCEDURE — 97116 GAIT TRAINING THERAPY: CPT | Mod: GP,CQ

## 2024-08-02 PROCEDURE — 97110 THERAPEUTIC EXERCISES: CPT | Mod: GP,CQ

## 2024-08-02 RX ORDER — HYDROXYZINE HYDROCHLORIDE 25 MG/1
25 TABLET, FILM COATED ORAL EVERY 6 HOURS PRN
Start: 2024-08-02

## 2024-08-02 RX ORDER — OLANZAPINE 2.5 MG/1
2.5 TABLET ORAL EVERY 8 HOURS PRN
Start: 2024-08-02

## 2024-08-02 RX ORDER — AMIODARONE HYDROCHLORIDE 200 MG/1
200 TABLET ORAL DAILY
Start: 2024-08-03

## 2024-08-02 RX ORDER — METOPROLOL SUCCINATE 25 MG/1
75 TABLET, EXTENDED RELEASE ORAL 2 TIMES DAILY
Start: 2024-08-02

## 2024-08-02 RX ORDER — OLANZAPINE 5 MG/1
2.5 TABLET, ORALLY DISINTEGRATING ORAL NIGHTLY
Start: 2024-08-02

## 2024-08-02 RX ORDER — POTASSIUM CHLORIDE 1.5 G/1.58G
20 POWDER, FOR SOLUTION ORAL DAILY
Start: 2024-08-02

## 2024-08-02 RX ORDER — TALC
3 POWDER (GRAM) TOPICAL NIGHTLY
Start: 2024-08-02

## 2024-08-02 RX ORDER — NAPROXEN SODIUM 220 MG/1
81 TABLET, FILM COATED ORAL DAILY
Start: 2024-08-03

## 2024-08-02 RX ADMIN — METOPROLOL SUCCINATE 75 MG: 50 TABLET, EXTENDED RELEASE ORAL at 09:04

## 2024-08-02 RX ADMIN — ASPIRIN 81 MG 81 MG: 81 TABLET ORAL at 09:04

## 2024-08-02 RX ADMIN — AMIODARONE HYDROCHLORIDE 200 MG: 200 TABLET ORAL at 09:04

## 2024-08-02 RX ADMIN — SENNOSIDES AND DOCUSATE SODIUM 1 TABLET: 50; 8.6 TABLET ORAL at 09:04

## 2024-08-02 RX ADMIN — POLYETHYLENE GLYCOL 3350 17 G: 17 POWDER, FOR SOLUTION ORAL at 09:04

## 2024-08-02 ASSESSMENT — COGNITIVE AND FUNCTIONAL STATUS - GENERAL
HELP NEEDED FOR BATHING: A LOT
MOBILITY SCORE: 12
STANDING UP FROM CHAIR USING ARMS: A LOT
CLIMB 3 TO 5 STEPS WITH RAILING: A LOT
MOVING FROM LYING ON BACK TO SITTING ON SIDE OF FLAT BED WITH BEDRAILS: A LOT
PERSONAL GROOMING: A LITTLE
DRESSING REGULAR UPPER BODY CLOTHING: A LOT
DAILY ACTIVITIY SCORE: 15
HELP NEEDED FOR BATHING: A LOT
MOVING TO AND FROM BED TO CHAIR: A LOT
TOILETING: A LOT
DRESSING REGULAR LOWER BODY CLOTHING: A LOT
WALKING IN HOSPITAL ROOM: A LOT
PERSONAL GROOMING: A LOT
CLIMB 3 TO 5 STEPS WITH RAILING: A LOT
MOVING FROM LYING ON BACK TO SITTING ON SIDE OF FLAT BED WITH BEDRAILS: A LOT
MOVING TO AND FROM BED TO CHAIR: A LOT
TURNING FROM BACK TO SIDE WHILE IN FLAT BAD: A LOT
STANDING UP FROM CHAIR USING ARMS: A LOT
WALKING IN HOSPITAL ROOM: A LOT
TOILETING: A LOT
DRESSING REGULAR UPPER BODY CLOTHING: A LITTLE
MOBILITY SCORE: 12
EATING MEALS: A LITTLE
TURNING FROM BACK TO SIDE WHILE IN FLAT BAD: A LOT
DAILY ACTIVITIY SCORE: 14
DRESSING REGULAR LOWER BODY CLOTHING: A LOT

## 2024-08-02 ASSESSMENT — PAIN - FUNCTIONAL ASSESSMENT
PAIN_FUNCTIONAL_ASSESSMENT: 0-10

## 2024-08-02 ASSESSMENT — ACTIVITIES OF DAILY LIVING (ADL): HOME_MANAGEMENT_TIME_ENTRY: 26

## 2024-08-02 ASSESSMENT — PAIN SCALES - GENERAL
PAINLEVEL_OUTOF10: 0 - NO PAIN

## 2024-08-02 NOTE — DISCHARGE SUMMARY
Discharge Diagnosis  Cardiac arrest (Multi)    Issues Requiring Follow-Up  Arrhythmia, ICD check - follow up with Dr. Goldman  KASH - repeat BMP in 1 week, if worsening will need follow up with Dr. Poole  Encephalopathy/suspected anoxic brain injury    Discharge Meds     Your medication list        START taking these medications        Instructions Last Dose Given Next Dose Due   amiodarone 200 mg tablet  Commonly known as: Pacerone  Start taking on: August 3, 2024      Take 1 tablet (200 mg) by mouth once daily.       aspirin 81 mg chewable tablet  Start taking on: August 3, 2024      Chew 1 tablet (81 mg) once daily.       hydrOXYzine HCL 25 mg tablet  Commonly known as: Atarax      Take 1 tablet (25 mg) by mouth every 6 hours if needed for anxiety.       melatonin 3 mg tablet      Take 1 tablet (3 mg) by mouth once daily at bedtime.       metoprolol succinate XL 25 mg 24 hr tablet  Commonly known as: Toprol-XL      Take 3 tablets (75 mg) by mouth 2 times a day. Do not crush or chew.       OLANZapine 2.5 mg tablet  Commonly known as: ZyPREXA      Take 1 tablet (2.5 mg) by mouth every 8 hours if needed (Agitation).       OLANZapine zydis 5 mg disintegrating tablet  Commonly known as: ZyPREXA      Take 0.5 tablets (2.5 mg) by mouth once daily at bedtime.       potassium chloride 20 mEq packet  Commonly known as: Klor-Con      Take 20 mEq by mouth once daily.              CONTINUE taking these medications        Instructions Last Dose Given Next Dose Due   allopurinol 300 mg tablet  Commonly known as: Zyloprim           atorvastatin 10 mg tablet  Commonly known as: Lipitor                  STOP taking these medications      amLODIPine 10 mg tablet  Commonly known as: Norvasc                  Where to Get Your Medications        Information about where to get these medications is not yet available    Ask your nurse or doctor about these medications  amiodarone 200 mg tablet  aspirin 81 mg chewable  tablet  hydrOXYzine HCL 25 mg tablet  melatonin 3 mg tablet  metoprolol succinate XL 25 mg 24 hr tablet  OLANZapine 2.5 mg tablet  OLANZapine zydis 5 mg disintegrating tablet  potassium chloride 20 mEq packet         Test Results Pending At Discharge  Pending Labs       No current pending labs.            Hospital Course   73yoF hx of gout, HTN who presented after being found unresponsive. Upon EMS arrival, patient was found to be pulseless, ACLS was initiated and patient achieved ROSC after 2 shocks and 1 epi. Workup in the ED showing severe mixed acidosis, hyperglycemia, mild leukocytosis. Patient was intubated in the ED. Also found to be hypotensive requiring levophed. Admitted to the ICU. Cardiology was consulted. Suspect patient had a Vfib arrest. Underwent cardiac cath on 7/23 which showed no obstructing lesions and patient did not require PCI. Patient was noted to be encephalopathic. Neurology was consulted. MRI negative for acute stroke. Suspected possible seizure vs anoxic brain injury. EEG was completed which was negative. Patient was extubated and weaned off pressor support. EP was consulted, recommended ICD placement for secondary prevention. Patient was intermittent agitated, evaluated by psychiatry and was started on zyprexa nightly. Initially patient's family was not agreeable to ICD placement; however did eventually agree to proceed with the procedure. Patient had ICD placed on 7/31/24. Hospital course complicated by KASH. Nephrology consulted, suspect KASH is likely due to volume depletion and hypotension. Home amlodipine was discontinued and patient was started on IVF. KASH improved. PT/OT recommending moderate intensity rehab at discharge. Family agreeable to SNF placement. Patient is now medically cleared by nephrology and EP for discharge to SNF. Recommend repeat BMP in 1 week to monitor renal function, if worsening will need to follow up with Dr. Poole. Patient will also need to follow up with  Dr. Goldman and her PCP for hospital follow up.     Pertinent Physical Exam At Time of Discharge  Physical Exam  Constitutional:       General: She is not in acute distress.     Appearance: She is not toxic-appearing.   HENT:      Head: Normocephalic.      Mouth/Throat:      Pharynx: Oropharynx is clear.   Eyes:      General: No scleral icterus.  Cardiovascular:      Rate and Rhythm: Normal rate.   Pulmonary:      Effort: No respiratory distress.      Breath sounds: No wheezing.   Abdominal:      General: There is no distension.      Palpations: Abdomen is soft.   Musculoskeletal:      Right lower leg: No edema.      Left lower leg: No edema.   Neurological:      Mental Status: She is alert.   Psychiatric:         Behavior: Behavior normal.         Outpatient Follow-Up  No future appointments.    Time spent on discharge: 35 minutes      Araseli Nash MD

## 2024-08-02 NOTE — CARE PLAN
The patient's goals for the shift include      The clinical goals for the shift include remain free of falls    Over the shift, the patient did not make progress toward the following goals. Barriers to progression include   Problem: Knowledge Deficit  Goal: Patient/family/caregiver demonstrates understanding of disease process, treatment plan, medications, and discharge instructions  Outcome: Progressing     Problem: Pain - Adult  Goal: Verbalizes/displays adequate comfort level or baseline comfort level  Outcome: Progressing     Problem: Safety - Adult  Goal: Free from fall injury  Outcome: Progressing     Problem: Discharge Planning  Goal: Discharge to home or other facility with appropriate resources  Outcome: Progressing     Problem: Chronic Conditions and Co-morbidities  Goal: Patient's chronic conditions and co-morbidity symptoms are monitored and maintained or improved  Outcome: Progressing     Problem: Skin  Goal: Decreased wound size/increased tissue granulation at next dressing change  Outcome: Progressing  Flowsheets (Taken 8/2/2024 0344)  Decreased wound size/increased tissue granulation at next dressing change: Promote sleep for wound healing  Goal: Participates in plan/prevention/treatment measures  Outcome: Progressing  Flowsheets (Taken 8/2/2024 0344)  Participates in plan/prevention/treatment measures: Elevate heels  Goal: Prevent/manage excess moisture  Outcome: Progressing  Flowsheets (Taken 8/2/2024 0344)  Prevent/manage excess moisture: Cleanse incontinence/protect with barrier cream  Goal: Prevent/minimize sheer/friction injuries  Outcome: Progressing  Flowsheets (Taken 8/2/2024 0344)  Prevent/minimize sheer/friction injuries: HOB 30 degrees or less  Goal: Promote/optimize nutrition  Outcome: Progressing  Flowsheets (Taken 8/2/2024 0344)  Promote/optimize nutrition: Monitor/record intake including meals  Goal: Promote skin healing  Outcome: Progressing  Flowsheets (Taken 8/2/2024 0344)  Promote  skin healing: Assess skin/pad under line(s)/device(s)     Problem: Nutrition  Goal: Less than 5 days NPO/clear liquids  Outcome: Progressing  Goal: Oral intake greater than 50%  Outcome: Progressing  Goal: Oral intake greater 75%  Outcome: Progressing  Goal: Consume prescribed supplement  Outcome: Progressing  Goal: Adequate PO fluid intake  Outcome: Progressing  Goal: Nutrition support goals are met within 48 hrs  Outcome: Progressing  Goal: Nutrition support is meeting 75% of nutrient needs  Outcome: Progressing  Goal: Tube feed tolerance  Outcome: Progressing  Goal: BG  mg/dL  Outcome: Progressing  Goal: Lab values WNL  Outcome: Progressing  Goal: Electrolytes WNL  Outcome: Progressing  Goal: Promote healing  Outcome: Progressing  Goal: Maintain stable weight  Outcome: Progressing     Problem: Fall/Injury  Goal: Not fall by end of shift  Outcome: Progressing  Goal: Be free from injury by end of the shift  Outcome: Progressing  Goal: Verbalize understanding of personal risk factors for fall in the hospital  Outcome: Progressing  Goal: Verbalize understanding of risk factor reduction measures to prevent injury from fall in the home  Outcome: Progressing  Goal: Use assistive devices by end of the shift  Outcome: Progressing  Goal: Pace activities to prevent fatigue by end of the shift  Outcome: Progressing     Problem: Risk for falls  Goal: I will remain free from falls  Outcome: Progressing     Problem: ADLs  Goal: Pt will complete ADL tasks at mod I with use of AE prn   Outcome: Progressing   . Recommendations to address these barriers include .

## 2024-08-02 NOTE — PROGRESS NOTES
"Nutrition Follow up Note    Nutrition Assessment      Patient with no c/o at time of assessment. She is agreeable to education on heart healthy diet at this time. No further questions. Will continue to monitor.     Nutrition History:  Food and Nutrient History: Patient reports good appetite and intake at this time.  Energy Intake: Good > 75 %  Food Allergies/Intolerances:  None  GI Symptoms: None  Oral Problems: None    Anthropometrics:  Ht: 170.2 cm (5' 7\"), Wt: 91.9 kg (202 lb 9.6 oz), BMI: 31.72  IBW/kg (Dietitian Calculated): 61 kg  Percent of IBW: 109 %       Weight Change:  Daily Weight  08/02/24 : 91.9 kg (202 lb 9.6 oz)                   Nutrition Focused Physical Exam Findings:   Subcutaneous Fat Loss  Orbital Fat Pads: Well nourished (slightly bulging fat pads)  Buccal Fat Pads: Well nourished (full, rounded cheeks)  Triceps: Well nourished (ample fat tissue)  Ribs: Defer    Muscle Wasting  Temporalis: Well nourished (well-defined muscle)  Pectoralis (Clavicular Region): Well nourished (clavicle not visible)  Deltoid/Trapezius: Well nourished (rounded appearance at arm, shoulder, neck)  Interosseous: Defer  Trapezius/Infraspinatus/Supraspinatus (Scapular Region): Defer  Quadriceps: Defer  Gastrocnemius: Defer              Nutrition Significant Labs:  Lab Results   Component Value Date    WBC 12.1 (H) 08/02/2024    HGB 11.6 (L) 08/02/2024    HCT 37.2 08/02/2024     08/02/2024    ALT 38 07/25/2024    AST 24 07/25/2024     08/02/2024    K 4.0 08/02/2024     08/02/2024    CREATININE 1.60 08/02/2024    BUN 25 08/02/2024    CO2 22 (L) 08/02/2024    TSH 2.98 07/19/2024    INR 1.0 07/19/2024    HGBA1C 5.7 (H) 06/29/2023     Nutrition Specific Medications:  [Held by provider] allopurinol, 300 mg, oral, Daily  amiodarone, 200 mg, oral, Daily  aspirin, 81 mg, oral, Daily  atorvastatin, 10 mg, oral, Nightly  [Held by provider] enoxaparin, 40 mg, subcutaneous, Daily  melatonin, 3 mg, oral, " Daily  metoprolol succinate XL, 75 mg, oral, BID  OLANZapine zydis, 2.5 mg, oral, Nightly  polyethylene glycol, 17 g, oral, Daily  [Held by provider] potassium chloride, 20 mEq, oral, BID  sennosides-docusate sodium, 1 tablet, oral, BID      sodium chloride 0.9%, 75 mL/hr, Last Rate: 75 mL/hr (08/01/24 1044)      Dietary Orders (From admission, onward)       Start     Ordered    07/31/24 1530  Adult diet Cardiac; 70 gm fat; 2 - 3 grams Sodium; 1:1 Feeding  Diet effective now        Comments: Compensatory Swallowing Strategies:                                                                                                                                                                                                                                                           Upright 90 degrees as possible for all oral intake, single sips/bites,  upright after meals   Question Answer Comment   Diet type Cardiac    Fat restriction: 70 gm fat    Sodium restriction: 2 - 3 grams Sodium    Select tray type: 1:1 Feeding        07/31/24 1530    07/29/24 1034  Oral nutritional supplements  Until discontinued        Comments: Any flavor   Question Answer Comment   Deliver with All meals    Select supplement: Magic Cup        07/29/24 1033                   Estimated Needs:   Estimated Energy Needs  Total Energy Estimated Needs (kCal): 1675 kCal  Total Estimated Energy Need per Day (kCal/kg): 25 kCal/kg  Method for Estimating Needs: Actual Wt    Estimated Protein Needs  Total Protein Estimated Needs (g): 67 g  Total Protein Estimated Needs (g/kg): 1 g/kg  Method for Estimating Needs: Actual Wt    Estimated Fluid Needs  Total Fluid Estimated Needs (mL): 1675 mL  Method for Estimating Needs: 1 mL/kcal        Nutrition Diagnosis   Nutrition Diagnosis:       Nutrition Diagnosis  Patient has Nutrition Diagnosis: Yes  Diagnosis Status (1): New  Nutrition Diagnosis 1: Food and nutrition related knowledge deficit  Related to (1): needs  review of diet education  As Evidenced by (1): pt request for education  Additional Nutrition Diagnosis: Diagnosis 2  Diagnosis Status (2): Resolved  Nutrition Diagnosis 2: Inadequate energy intake  Related to (2): decreased ability to consume sufficient energy  As Evidenced by (2): poor po intake       Nutrition Interventions/Recommendations   Nutrition Interventions and Recommendations:    Nutrition Prescription:  Individualized Nutrition Prescription Provided for : 1675 calories, 67 gm protein to be provided via diet/nutritional supplements    Nutrition Interventions:   Food and/or Nutrient Delivery Interventions  Interventions: Meals and snacks, Medical food supplement  Meals and Snacks: Fat-modified diet, Mineral-modified diet  Goal: provide diet as ordered  Medical Food Supplement: Modified food  Goal: Discontinue magic cup TID to provide 290 kcals and 9g protein each  Additional Interventions: Patient requested supplement be discontinued    Education Documentation  Nutrition Care Manual, taught by Ami Harrington RDN, LD at 8/2/2024 10:15 AM.  Learner: Patient  Readiness: Acceptance  Method: Explanation  Response: Verbalizes Understanding  Comment: Discussed heart-healthy diet-- limiting saturated fats and replacing with unsaturated fats, increasing fiber intake, decreasing sodium intake.             Nutrition Monitoring and Evaluation   Monitoring/Evaluation:   Food/Nutrient Related History Monitoring  Monitoring and Evaluation Plan: Energy intake  Energy Intake: Estimated energy intake  Criteria: pt to consume >/= 75% estimated needs         Time Spent/Follow-up:   Follow Up  Time Spent (min): 30 minutes  Last Date of Nutrition Visit: 08/02/24  Nutrition Follow-Up Needed?: 5-7 days  Follow up Comment: 8/7/24

## 2024-08-02 NOTE — PROGRESS NOTES
08/02/24 1311   Discharge Planning   Has discharge transport been arranged? Yes   What day is the transport expected? 08/02/24   What time is the transport expected? 1500     7000 Completed   AVS and goldenrod sent to facility   Spouse updated   RN made aware and given report number

## 2024-08-02 NOTE — PROGRESS NOTES
Physical Therapy    Physical Therapy Treatment    Patient Name: Nilam Powell  MRN: [id number]  Today's Date: 8/2/2024  Time Calculation  Start Time: 0721  Stop Time: 0746  Time Calculation (min): 25 min    Assessment/Plan   PT Assessment  End of Session Communication: Bedside nurse  Assessment Comment: Pt presents with significant cognitive deficits, pt talking non-sensical at times, thinks she is in her home, provided frequent orientation cues with poor carryover. RN notified.  End of Session Patient Position: Up in chair, Alarm on  PT Plan  Inpatient/Swing Bed or Outpatient: Inpatient  PT Plan  Treatment/Interventions: Bed mobility, Transfer training, Gait training, Stair training, Balance training, Neuromuscular re-education, Strengthening, Endurance training, Range of motion, Therapeutic exercise, Therapeutic activity  PT Plan: Ongoing PT  PT Frequency: 6 times per week  PT Discharge Recommendations: Moderate intensity level of continued care  Equipment Recommended upon Discharge: Wheeled walker  PT Recommended Transfer Status:  (MIN/MOD A)  PT - OK to Discharge: Yes      General Visit Information:   PT  Visit  PT Received On: 08/02/24  General  Prior to Session Communication: Bedside nurse  Patient Position Received: Bed, 3 rail up, Alarm on  General Comment: Cleared by nursing to be seen for therapy, pt present with significant confusion, supine in bed upon arrival.    Subjective   Precautions:  Precautions  Precautions Comment: pacemaker precautions (+Sling)       Objective   Pain:  Pain Assessment  Pain Assessment: 0-10  0-10 (Numeric) Pain Score: 0 - No pain    Postural Control:  Static Sitting Balance  Static Sitting-Balance Support: Bilateral upper extremity supported, Feet supported  Static Sitting-Level of Assistance: Close supervision  Static Sitting-Comment/Number of Minutes: Fair seated static balance.  Static Standing Balance  Static Standing-Balance Support: Right upper extremity supported  Static  Standing-Level of Assistance: Moderate assistance  Static Standing-Comment/Number of Minutes: Poor static standing balance with RUE support, LUE in sling.    Treatments:  Therapeutic Exercise  Therapeutic Exercise Performed: Yes  Therapeutic Exercise Activity 1: Bilateral ankle pumps x15  Therapeutic Exercise Activity 2: Bilateral hip flexion x15 (Frequent cues to remain on task to complete therex.)  Therapeutic Exercise Activity 3: Bilateral knee extension x15  Therapeutic Exercise Activity 4: Resisted hip abd/add 15    Bed Mobility  Bed Mobility: Yes  Bed Mobility 1  Bed Mobility 1: Supine to sitting  Level of Assistance 1: Moderate assistance  Bed Mobility Comments 1: Mod assist for trunk during supine to sit, pt able to initiate LE's towards EOB, frequent cues for no use of LUE due to pacemaker precautions.    Ambulation/Gait Training  Ambulation/Gait Training Performed: Yes  Ambulation/Gait Training 1  Surface 1: Level tile  Device 1: Rolling walker  Assistance 1: Moderate assistance  Quality of Gait 1: Wide base of support, Decreased step length, Diminished heel strike, Forward flexed posture  Comments/Distance (ft) 1: 10' with wheeled walker, presents with flexed posture, requires assist to maneuver walker, mod assist for balance.    Transfers  Transfer: Yes  Transfer 1  Transfer From 1: Sit to  Transfer to 1: Stand  Technique 1: Sit to stand, Stand to sit  Transfer Level of Assistance 1: Moderate assistance  Trials/Comments 1: Mod assist for trunk up during sit to stand, decreased eccentric control in sitting.    Outcome Measures:  [facility] Basic Mobility  Turning from your back to your side while in a flat bed without using bedrails: A lot  Moving from lying on your back to sitting on the side of a flat bed without using bedrails: A lot  Moving to and from bed to chair (including a wheelchair): A lot  Standing up from a chair using your arms (e.g. wheelchair or bedside chair): A lot  To walk in hospital room:  A lot  Climbing 3-5 steps with railing: A lot  Basic Mobility - Total Score: 12    Encounter Problems       Encounter Problems (Active)       Balance       STG - Maintains static standing balance with upper extremity support and min assist x 1. (Progressing)       Start:  07/22/24    Expected End:  08/22/24       INTERVENTIONS:  1. Practice standing with minimal support.  2. Educate patient about standing tolerance.  3. Educate patient about independence with gait, transfers, and ADL's.  4. Educate patient about use of assistive device.  5. Educate patient about self-directed care.         STG - Maintains static sitting balance with upper extremity support and supervision for safety. (Progressing)       Start:  07/22/24    Expected End:  08/22/24       INTERVENTIONS:  1. Practice sitting on the edge of a bed/mat with minimal support.  2. Educate patient about maintining total hip precautions while maintaining balance.  3. Educate patient about pressure relief.  4. Educate patient about use of assistive device.            Mobility       STG - Patient will ambulate 50' with use of rolling walker and min assist x 1. (Progressing)       Start:  07/22/24    Expected End:  08/22/24            STG - Patient will ambulate up and down a curb/step with min assist x 1. (Not Progressing)       Start:  07/22/24    Expected End:  08/22/24               PT Transfers       STG - Patient to transfer to and from sit to supine with supervision for safety. (Progressing)       Start:  07/22/24    Expected End:  08/22/24            STG - Patient will transfer sit to and from stand with use of rolling walker and min assist x 1. (Progressing)       Start:  07/22/24    Expected End:  08/22/24               Pain - Adult          Safety       STG - Patient uses call light consistently to request assistance with transfers (Progressing)       Start:  07/22/24    Expected End:  08/22/24

## 2024-08-02 NOTE — PROGRESS NOTES
CONSULT PROGRESS NOTES    SERVICE DATE: 8/2/2024   SERVICE TIME: 12:15 PM    CONSULTING SERVICE: Nephrology    ASSESSMENT AND PLAN   73-year-old with hypertension and gout admitted after cardiac arrest, seen for acute renal failure.  1.  Acute renal failure  2.  Hypotension     The acute renal failure is likely from mild volume depletion.  She had an unremarkable sonogram.  She had an unremarkable urinalysis and urine sodium.  No further need for IV fluids.  The patient has a baseline creatinine that is 0.9, but the creatinine has leveled off at 1.6.  I do suspect this will drift in the correct direction over time.   I reviewed all the medications that the patient has received and there really are no obvious nephrotoxins.  The blood pressure is now reasonably stable.  I have no specific injection to discharge.  Outpatient follow-up if the serum creatinine does not drift back down.  Case discussed with Dr. Nash.  I will follow this patient.    SUBJECTIVE  INTERVAL HPI: Feels better, had some fecal incontinence, still confused.    MEDICATIONS:  [Held by provider] allopurinol, 300 mg, oral, Daily  amiodarone, 200 mg, oral, Daily  aspirin, 81 mg, oral, Daily  atorvastatin, 10 mg, oral, Nightly  [Held by provider] enoxaparin, 40 mg, subcutaneous, Daily  melatonin, 3 mg, oral, Daily  metoprolol succinate XL, 75 mg, oral, BID  OLANZapine zydis, 2.5 mg, oral, Nightly  polyethylene glycol, 17 g, oral, Daily  [Held by provider] potassium chloride, 20 mEq, oral, BID  sennosides-docusate sodium, 1 tablet, oral, BID       sodium chloride 0.9%, 75 mL/hr, Last Rate: 75 mL/hr (08/01/24 1044)       PRN medications: acetaminophen, bisacodyl, dextrose, dextrose, glucagon, glucagon, hydrOXYzine HCL, OLANZapine, OLANZapine, oxygen     OBJECTIVE  PHYSICAL EXAM:   Heart Rate:  []   Temp:  [36.1 °C (97 °F)-36.7 °C (98.1 °F)]   Resp:  [16-26]   BP: (123-136)/()   Weight:  [91.9 kg (202 lb 9.6 oz)]   SpO2:  [92 %-98 %]   Body  mass index is 31.73 kg/m².  This is an elderly black woman who appears in no acute distress  No obvious skin rashes  Hearing intact  Phonation intact  Moist mucosa  Newly implanted left upper chest pacemaker under bandage  Lungs are clear to auscultation bilaterally  Abdomen is soft, nondistended, nontender, positive bowel sounds  No Orellana catheter in place, no suprapubic tenderness to palpation  No extremity edema  Moves 4 limbs spontaneously  No obvious joint deformities  No lymphadenopathy  Somewhat confused affect    DATA:   Labs:  Results for orders placed or performed during the hospital encounter of 07/19/24 (from the past 96 hour(s))   Basic metabolic panel   Result Value Ref Range    Glucose 101 (H) 65 - 99 mg/dL    Sodium 141 133 - 145 mmol/L    Potassium 4.1 3.4 - 5.1 mmol/L    Chloride 105 97 - 107 mmol/L    Bicarbonate 22 (L) 24 - 31 mmol/L    Urea Nitrogen 21 8 - 25 mg/dL    Creatinine 1.30 0.40 - 1.60 mg/dL    eGFR 44 (L) >60 mL/min/1.73m*2    Calcium 8.8 8.5 - 10.4 mg/dL    Anion Gap 14 <=19 mmol/L   CBC and Auto Differential   Result Value Ref Range    WBC 10.9 4.4 - 11.3 x10*3/uL    nRBC 0.0 0.0 - 0.0 /100 WBCs    RBC 4.20 4.00 - 5.20 x10*6/uL    Hemoglobin 11.6 (L) 12.0 - 16.0 g/dL    Hematocrit 36.4 36.0 - 46.0 %    MCV 87 80 - 100 fL    MCH 27.6 26.0 - 34.0 pg    MCHC 31.9 (L) 32.0 - 36.0 g/dL    RDW 15.9 (H) 11.5 - 14.5 %    Platelets 213 150 - 450 x10*3/uL    Neutrophils % 72.8 40.0 - 80.0 %    Immature Granulocytes %, Automated 1.2 (H) 0.0 - 0.9 %    Lymphocytes % 15.2 13.0 - 44.0 %    Monocytes % 8.4 2.0 - 10.0 %    Eosinophils % 1.8 0.0 - 6.0 %    Basophils % 0.6 0.0 - 2.0 %    Neutrophils Absolute 7.92 (H) 1.60 - 5.50 x10*3/uL    Immature Granulocytes Absolute, Automated 0.13 0.00 - 0.50 x10*3/uL    Lymphocytes Absolute 1.66 0.80 - 3.00 x10*3/uL    Monocytes Absolute 0.92 (H) 0.05 - 0.80 x10*3/uL    Eosinophils Absolute 0.20 0.00 - 0.40 x10*3/uL    Basophils Absolute 0.06 0.00 - 0.10  x10*3/uL   Magnesium   Result Value Ref Range    Magnesium 2.00 1.60 - 3.10 mg/dL   Phosphorus   Result Value Ref Range    Phosphorus 3.9 2.5 - 4.5 mg/dL   Basic metabolic panel   Result Value Ref Range    Glucose 105 (H) 65 - 99 mg/dL    Sodium 139 133 - 145 mmol/L    Potassium 4.8 3.4 - 5.1 mmol/L    Chloride 105 97 - 107 mmol/L    Bicarbonate 23 (L) 24 - 31 mmol/L    Urea Nitrogen 34 (H) 8 - 25 mg/dL    Creatinine 2.00 (H) 0.40 - 1.60 mg/dL    eGFR 26 (L) >60 mL/min/1.73m*2    Calcium 8.9 8.5 - 10.4 mg/dL    Anion Gap 11 <=19 mmol/L   CBC and Auto Differential   Result Value Ref Range    WBC 11.5 (H) 4.4 - 11.3 x10*3/uL    nRBC 0.0 0.0 - 0.0 /100 WBCs    RBC 4.12 4.00 - 5.20 x10*6/uL    Hemoglobin 11.2 (L) 12.0 - 16.0 g/dL    Hematocrit 36.5 36.0 - 46.0 %    MCV 89 80 - 100 fL    MCH 27.2 26.0 - 34.0 pg    MCHC 30.7 (L) 32.0 - 36.0 g/dL    RDW 16.1 (H) 11.5 - 14.5 %    Platelets 212 150 - 450 x10*3/uL    Neutrophils % 70.6 40.0 - 80.0 %    Immature Granulocytes %, Automated 0.8 0.0 - 0.9 %    Lymphocytes % 17.4 13.0 - 44.0 %    Monocytes % 8.3 2.0 - 10.0 %    Eosinophils % 2.4 0.0 - 6.0 %    Basophils % 0.5 0.0 - 2.0 %    Neutrophils Absolute 8.13 (H) 1.60 - 5.50 x10*3/uL    Immature Granulocytes Absolute, Automated 0.09 0.00 - 0.50 x10*3/uL    Lymphocytes Absolute 2.01 0.80 - 3.00 x10*3/uL    Monocytes Absolute 0.96 (H) 0.05 - 0.80 x10*3/uL    Eosinophils Absolute 0.28 0.00 - 0.40 x10*3/uL    Basophils Absolute 0.06 0.00 - 0.10 x10*3/uL   Magnesium   Result Value Ref Range    Magnesium 2.40 1.60 - 3.10 mg/dL   Phosphorus   Result Value Ref Range    Phosphorus 4.4 2.5 - 4.5 mg/dL   Basic metabolic panel   Result Value Ref Range    Glucose 101 (H) 65 - 99 mg/dL    Sodium 141 133 - 145 mmol/L    Potassium 4.5 3.4 - 5.1 mmol/L    Chloride 105 97 - 107 mmol/L    Bicarbonate 23 (L) 24 - 31 mmol/L    Urea Nitrogen 34 (H) 8 - 25 mg/dL    Creatinine 2.00 (H) 0.40 - 1.60 mg/dL    eGFR 26 (L) >60 mL/min/1.73m*2    Calcium  8.6 8.5 - 10.4 mg/dL    Anion Gap 13 <=19 mmol/L   CBC and Auto Differential   Result Value Ref Range    WBC 12.2 (H) 4.4 - 11.3 x10*3/uL    nRBC 0.0 0.0 - 0.0 /100 WBCs    RBC 4.17 4.00 - 5.20 x10*6/uL    Hemoglobin 11.3 (L) 12.0 - 16.0 g/dL    Hematocrit 35.9 (L) 36.0 - 46.0 %    MCV 86 80 - 100 fL    MCH 27.1 26.0 - 34.0 pg    MCHC 31.5 (L) 32.0 - 36.0 g/dL    RDW 15.8 (H) 11.5 - 14.5 %    Platelets 195 150 - 450 x10*3/uL    Neutrophils % 78.7 40.0 - 80.0 %    Immature Granulocytes %, Automated 0.7 0.0 - 0.9 %    Lymphocytes % 11.6 13.0 - 44.0 %    Monocytes % 6.5 2.0 - 10.0 %    Eosinophils % 2.0 0.0 - 6.0 %    Basophils % 0.5 0.0 - 2.0 %    Neutrophils Absolute 9.63 (H) 1.60 - 5.50 x10*3/uL    Immature Granulocytes Absolute, Automated 0.08 0.00 - 0.50 x10*3/uL    Lymphocytes Absolute 1.42 0.80 - 3.00 x10*3/uL    Monocytes Absolute 0.80 0.05 - 0.80 x10*3/uL    Eosinophils Absolute 0.25 0.00 - 0.40 x10*3/uL    Basophils Absolute 0.06 0.00 - 0.10 x10*3/uL   Renal Function Panel   Result Value Ref Range    Glucose 97 65 - 99 mg/dL    Sodium 140 133 - 145 mmol/L    Potassium 4.0 3.4 - 5.1 mmol/L    Chloride 106 97 - 107 mmol/L    Bicarbonate 20 (L) 24 - 31 mmol/L    Urea Nitrogen 31 (H) 8 - 25 mg/dL    Creatinine 1.70 (H) 0.40 - 1.60 mg/dL    eGFR 32 (L) >60 mL/min/1.73m*2    Calcium 8.6 8.5 - 10.4 mg/dL    Phosphorus 3.6 2.5 - 4.5 mg/dL    Albumin 3.3 (L) 3.5 - 5.0 g/dL    Anion Gap 14 <=19 mmol/L   Renal Function Panel   Result Value Ref Range    Glucose 107 (H) 65 - 99 mg/dL    Sodium 140 133 - 145 mmol/L    Potassium 4.2 3.4 - 5.1 mmol/L    Chloride 106 97 - 107 mmol/L    Bicarbonate 21 (L) 24 - 31 mmol/L    Urea Nitrogen 27 (H) 8 - 25 mg/dL    Creatinine 1.60 0.40 - 1.60 mg/dL    eGFR 34 (L) >60 mL/min/1.73m*2    Calcium 8.7 8.5 - 10.4 mg/dL    Phosphorus 3.4 2.5 - 4.5 mg/dL    Albumin 3.2 (L) 3.5 - 5.0 g/dL    Anion Gap 13 <=19 mmol/L   Cardiac Device Check - Remote   Result Value Ref Range    BSA 2.08 m2    Sodium, Urine Random   Result Value Ref Range    Sodium, Urine Random 69 NOT ESTABLISHED mmol/L    Creatinine, Urine Random 78.1 NOT ESTABLISHED mg/dL    Sodium/Creatinine Ratio 88 Not established. mmol/g Creat   Urinalysis with Reflex Microscopic   Result Value Ref Range    Color, Urine Light-Yellow Light-Yellow, Yellow, Dark-Yellow    Appearance, Urine Clear Clear    Specific Gravity, Urine 1.012 1.005 - 1.035    pH, Urine 5.0 5.0, 5.5, 6.0, 6.5, 7.0, 7.5, 8.0    Protein, Urine NEGATIVE NEGATIVE, 10 (TRACE), 20 (TRACE) mg/dL    Glucose, Urine Normal Normal mg/dL    Blood, Urine NEGATIVE NEGATIVE    Ketones, Urine NEGATIVE NEGATIVE mg/dL    Bilirubin, Urine NEGATIVE NEGATIVE    Urobilinogen, Urine Normal Normal mg/dL    Nitrite, Urine NEGATIVE NEGATIVE    Leukocyte Esterase, Urine NEGATIVE NEGATIVE   CBC and Auto Differential   Result Value Ref Range    WBC 12.1 (H) 4.4 - 11.3 x10*3/uL    nRBC 0.0 0.0 - 0.0 /100 WBCs    RBC 4.23 4.00 - 5.20 x10*6/uL    Hemoglobin 11.6 (L) 12.0 - 16.0 g/dL    Hematocrit 37.2 36.0 - 46.0 %    MCV 88 80 - 100 fL    MCH 27.4 26.0 - 34.0 pg    MCHC 31.2 (L) 32.0 - 36.0 g/dL    RDW 15.9 (H) 11.5 - 14.5 %    Platelets 188 150 - 450 x10*3/uL    Neutrophils % 78.1 40.0 - 80.0 %    Immature Granulocytes %, Automated 0.6 0.0 - 0.9 %    Lymphocytes % 11.8 13.0 - 44.0 %    Monocytes % 6.8 2.0 - 10.0 %    Eosinophils % 2.2 0.0 - 6.0 %    Basophils % 0.5 0.0 - 2.0 %    Neutrophils Absolute 9.46 (H) 1.60 - 5.50 x10*3/uL    Immature Granulocytes Absolute, Automated 0.07 0.00 - 0.50 x10*3/uL    Lymphocytes Absolute 1.43 0.80 - 3.00 x10*3/uL    Monocytes Absolute 0.82 (H) 0.05 - 0.80 x10*3/uL    Eosinophils Absolute 0.27 0.00 - 0.40 x10*3/uL    Basophils Absolute 0.06 0.00 - 0.10 x10*3/uL   Renal Function Panel   Result Value Ref Range    Glucose 94 65 - 99 mg/dL    Sodium 141 133 - 145 mmol/L    Potassium 4.0 3.4 - 5.1 mmol/L    Chloride 106 97 - 107 mmol/L    Bicarbonate 22 (L) 24 - 31 mmol/L     Urea Nitrogen 25 8 - 25 mg/dL    Creatinine 1.60 0.40 - 1.60 mg/dL    eGFR 34 (L) >60 mL/min/1.73m*2    Calcium 9.3 8.5 - 10.4 mg/dL    Phosphorus 3.2 2.5 - 4.5 mg/dL    Albumin 3.4 (L) 3.5 - 5.0 g/dL    Anion Gap 13 <=19 mmol/L   Light Blue Top   Result Value Ref Range    Extra Tube Hold for add-ons.          SIGNATURE: Jered Poole MD PATIENT NAME: Nilam Powell   DATE: August 2, 2024 MRN: [id number]   TIME: 12:15 PM PAGER: [phone]

## 2024-08-02 NOTE — PROGRESS NOTES
Occupational Therapy    OT Treatment    Patient Name: Nilam Powell  MRN: [id number]  Today's Date: 8/2/2024  Time Calculation  Start Time: 0929  Stop Time: 0955  Time Calculation (min): 26 min        Assessment:  OT Assessment: Gradual progress made towards OT goals. Continue with current OT POC to increase strength, balance and functional tolerance to maximize safety and independence during ADLs.  Barriers to Discharge: Inaccessible home environment, Decreased caregiver support  Evaluation/Treatment Tolerance: Treatment limited secondary to agitation  End of Session Communication: Bedside nurse  End of Session Patient Position: Bed, 3 rail up, Alarm on (all needs in reach)  OT Assessment Results: Decreased ADL status, Decreased cognition, Decreased endurance, Decreased safe judgment during ADL, Decreased functional mobility, Decreased trunk control for functional activities  Barriers to Discharge: Inaccessible home environment, Decreased caregiver support  Evaluation/Treatment Tolerance: Treatment limited secondary to agitation  Plan:  Treatment Interventions: ADL retraining, Functional transfer training, UE strengthening/ROM, Endurance training, Patient/family training, Equipment evaluation/education, Compensatory technique education  OT Frequency: 4 times per week  OT Discharge Recommendations: Moderate intensity level of continued care, 24 hr supervision due to cognition  Equipment Recommended upon Discharge: Wheeled walker  OT Recommended Transfer Status: Assist of 1, Assist of 2  OT - OK to Discharge: Yes  Treatment Interventions: ADL retraining, Functional transfer training, UE strengthening/ROM, Endurance training, Patient/family training, Equipment evaluation/education, Compensatory technique education    Subjective   Previous Visit Info:  OT Last Visit  OT Received On: 08/02/24  General:  General  Reason for Referral: s/p cardiac arrest requiring intubation/ extubation, AMS; impaired ADLs, s/p pacemaker  placement  Past Medical History Relevant to Rehab: HTN, DLP, gout, uterine fibroid, Colonoscopy, hysterectomy  Prior to Session Communication: Bedside nurse  Patient Position Received: Up in room  General Comment: Upon arrival pt up in room- chair alarm going off. Education provided on importance of assistance to decrease risk of falls. Coffee spilled onto floor and clothing spread throughout room. Pt cleared for OT session per nursing. Pt reluctant to participate in OT session with assistance.  Precautions:  Hearing/Visual Limitations: Glasses  Medical Precautions: Fall precautions  Post-Surgical Precautions: Other (comment) (pacemaker precautions)  Precautions Comment: pacemaker precautions- LUE sling  Vital Signs:     Pain:  Pain Assessment  Pain Assessment: 0-10  0-10 (Numeric) Pain Score: 0 - No pain  Clinical Progression: Not changed    Objective    Cognition:  Cognition  Overall Cognitive Status: Impaired  Orientation Level: Disoriented to place, Disoriented to time, Disoriented to situation  Following Commands: Follows one step commands with repetition  Safety Judgment: Decreased awareness of need for assistance  Safety/Judgement: Exceptions to WFL  Insight: Severe  Impulsive: Severely  Task Initiation: Initiates with cues  Processing Speed: Delayed  Coordination:  Movements are Fluid and Coordinated: No  Upper Body Coordination: slower rate of movements  Lower Body Coordination: slower rate of movements  Activities of Daily Living: Grooming  Grooming Comments: hand hygiene tasks completed standing at sink with CGA, pt resistive to pacemaker precautions. Poor insight into hygiene- pt attempting to wash hands in urine hat. Constant redirection and verbal cues required for sequencing    Toileting  Toileting Comments: pt with bowel incontinence requiring modA for posterior hygiene in supported standing  Functional Standing Tolerance:     Bed Mobility/Transfers: Bed Mobility  Bed Mobility: Yes  Bed Mobility  1  Bed Mobility 1: Sitting to supine  Level of Assistance 1: Moderate assistance  Bed Mobility Comments 1: for BLE management    Transfers  Transfer: Yes  Transfer 1  Trials/Comments 1: sit<>stands completed from standard chair and toilet heights with Radha- verbal/ tactile cues required for proper hand placement to increase safety and decrease risk of falls. Poor carry-over observed.      Functional Mobility:  Functional Mobility  Functional Mobility Performed: Yes  Functional Mobility 1  Surface 1: Level tile  Device 1: No device  Assistance 1: Minimum assistance  Quality of Functional Mobility 1: Inconsistent stride length  Comments 1: Functional mobility trials completed at minimal household distances to increase functional tolerance and assess safety awareness to increase overall safety and independence prior to home-going. Poor insight/ safety awareness observed this date.        Outcome Measures:[facility] Daily Activity  Putting on and taking off regular lower body clothing: A lot  Bathing (including washing, rinsing, drying): A lot  Putting on and taking off regular upper body clothing: A little  Toileting, which includes using toilet, bedpan or urinal: A lot  Taking care of personal grooming such as brushing teeth: A little  Eating Meals: A little  Daily Activity - Total Score: 15        Education Documentation  Body Mechanics, taught by LINN Walls at 8/2/2024  1:14 PM.  Learner: Patient  Readiness: Nonacceptance  Method: Explanation, Demonstration  Response: Needs Reinforcement    ADL Training, taught by LINN Walls at 8/2/2024  1:14 PM.  Learner: Patient  Readiness: Nonacceptance  Method: Explanation, Demonstration  Response: Needs Reinforcement    Education Comments  Education provided on role of OT/POC, safety awareness throughout functional tasks/transfers, importance of activity/ rest routine, EC/WS techniques, and use of call light for assistance. Questions, comments and concerns  addressed regarding OT.      Goals:  Encounter Problems       Encounter Problems (Active)       ADLs       Pt will complete ADL tasks at mod I with use of AE prn  (Progressing)       Start:  07/22/24    Expected End:  08/01/24               Functional Mobility       Pt will perform functional mobility household distances at mod I with use of RW.    (Progressing)       Start:  07/22/24    Expected End:  08/26/24               OT Transfers       Pt will perform BUE exercises to improve strength and independence with functional transfers/ADL tasks.   (Progressing)       Start:  07/22/24    Expected End:  08/26/24

## (undated) DEVICE — SUTURE, V-LOC, 3.0, 9 IN, CV-20, 90ABS UNDYED

## (undated) DEVICE — ENVELOPE, ANTIBACTERIAL, AIGIS RX TYRX, ABSORBABLE, MED

## (undated) DEVICE — CATHETER, ANGIO, IMPULSE, FR4, 6 FR X 100 CM

## (undated) DEVICE — PACK, ANGIO P2, CUSTOM, LAKE

## (undated) DEVICE — CLOSURE DEVICE, VASCULAR, ANGIO-SEAL, VIP, 6FR, LF

## (undated) DEVICE — INTRODUCER SYSTEM, PRELUDE SNAP, SPLITTABLE, HEMOSTATIC, 6FR

## (undated) DEVICE — INTRODUCER SYSTEM, PRELUDE SNAP, SPITTABLE, 8 FR X 13CM

## (undated) DEVICE — CATHETER, DIAGNOSTIC, INFINITI, 6 FR, JL 4.0

## (undated) DEVICE — VEIN PICK, ACC SUP, 6541

## (undated) DEVICE — KIT, NAMIC STANDARD LEFT HEART, CUSTOM, LWMC

## (undated) DEVICE — GUIDEWIRE, J TIP, 3 MM, 0.035 IN X 150 CM, PTFE

## (undated) DEVICE — GUIDEWIRE, J TIP, 3 MM, 0.035 IN X 260 CM, PTFE

## (undated) DEVICE — COVER, EQUIPMENT, ZERO GRAVITY

## (undated) DEVICE — Device

## (undated) DEVICE — MICROINTRODUCER KIT, VSI, 4FR X 40CM, STIFFEN

## (undated) DEVICE — SUTURE, V-LOC, 4-0, 12 IN, P-12, 90 ABS

## (undated) DEVICE — INTRODUCER, SHEATH, AVANTI PLUS, W/MINI WIRE, STANDARD, 6MS FR, 11 CM

## (undated) DEVICE — PAD, ELECTRODE DEFIB PADPRO ADULT STRL W/ADAPTER

## (undated) DEVICE — CATHETER, ANGIO, IMPULSE, PIG 145, 6 FR X 110 CM (5 PK)

## (undated) DEVICE — INTRODUCER SHEATH, GLIDESHEATH, 6FR 25CM